# Patient Record
Sex: FEMALE | Race: WHITE | NOT HISPANIC OR LATINO | Employment: FULL TIME | URBAN - METROPOLITAN AREA
[De-identification: names, ages, dates, MRNs, and addresses within clinical notes are randomized per-mention and may not be internally consistent; named-entity substitution may affect disease eponyms.]

---

## 2017-01-05 ENCOUNTER — HOSPITAL ENCOUNTER (EMERGENCY)
Facility: CLINIC | Age: 42
Discharge: HOME | End: 2017-01-05
Attending: PHYSICIAN ASSISTANT | Admitting: PHYSICIAN ASSISTANT
Payer: COMMERCIAL

## 2017-01-05 VITALS
HEART RATE: 92 BPM | OXYGEN SATURATION: 100 % | RESPIRATION RATE: 16 BRPM | DIASTOLIC BLOOD PRESSURE: 80 MMHG | TEMPERATURE: 98.4 F | SYSTOLIC BLOOD PRESSURE: 123 MMHG

## 2017-01-05 DIAGNOSIS — R39.15 URINARY URGENCY: ICD-10-CM

## 2017-01-05 LAB
ALBUMIN UR-MCNC: NEGATIVE MG/DL
APPEARANCE UR: CLEAR
BILIRUB UR QL STRIP: NEGATIVE
COLOR UR AUTO: YELLOW
GLUCOSE UR STRIP-MCNC: NEGATIVE MG/DL
HCG UR QL: NEGATIVE
HGB UR QL STRIP: NEGATIVE
KETONES UR STRIP-MCNC: NEGATIVE MG/DL
LEUKOCYTE ESTERASE UR QL STRIP: ABNORMAL
MICRO REPORT STATUS: NORMAL
MUCOUS THREADS #/AREA URNS LPF: PRESENT /LPF
NITRATE UR QL: NEGATIVE
PH UR STRIP: 5 PH (ref 5–7)
RBC #/AREA URNS AUTO: <1 /HPF (ref 0–2)
SP GR UR STRIP: 1.02 (ref 1–1.03)
SPECIMEN SOURCE: NORMAL
SQUAMOUS #/AREA URNS AUTO: 5 /HPF (ref 0–1)
URN SPEC COLLECT METH UR: ABNORMAL
UROBILINOGEN UR STRIP-MCNC: NORMAL MG/DL (ref 0–2)
WBC #/AREA URNS AUTO: 1 /HPF (ref 0–2)
WET PREP SPEC: NORMAL

## 2017-01-05 PROCEDURE — 87491 CHLMYD TRACH DNA AMP PROBE: CPT | Performed by: PHYSICIAN ASSISTANT

## 2017-01-05 PROCEDURE — 87591 N.GONORRHOEAE DNA AMP PROB: CPT | Performed by: PHYSICIAN ASSISTANT

## 2017-01-05 PROCEDURE — 87210 SMEAR WET MOUNT SALINE/INK: CPT | Performed by: PHYSICIAN ASSISTANT

## 2017-01-05 PROCEDURE — 87086 URINE CULTURE/COLONY COUNT: CPT | Performed by: PHYSICIAN ASSISTANT

## 2017-01-05 PROCEDURE — 99213 OFFICE O/P EST LOW 20 MIN: CPT

## 2017-01-05 PROCEDURE — 81001 URINALYSIS AUTO W/SCOPE: CPT | Performed by: PHYSICIAN ASSISTANT

## 2017-01-05 PROCEDURE — 99213 OFFICE O/P EST LOW 20 MIN: CPT | Performed by: PHYSICIAN ASSISTANT

## 2017-01-05 PROCEDURE — 81025 URINE PREGNANCY TEST: CPT | Performed by: PHYSICIAN ASSISTANT

## 2017-01-05 NOTE — ED AVS SNAPSHOT
[facility] Emergency Department    [address] [address]    Phone:  [phone]    Fax:  [phone]                                       Lauren Macdonald   MRN: [id number]    Department:  [facility] Emergency Department   Date of Visit:  1/5/2017           Patient Information     Date Of Birth          1975        Your diagnoses for this visit were:     Urinary urgency        You were seen by Cary Ramirez PA-C.      Follow-up Information     Follow up with Duke Abad MD In 1 day.    Specialty:  Family Practice    Why:  As needed, If symptoms worsen    Contact information:    [facility]  [address] [facility] [address]  [phone]        Discharge References/Attachments     DYSURIA, UNCERTAIN CAUSE (ADULT) (ENGLISH)      24 Hour Appointment Hotline       To make an appointment at any [facility], call [phone]). If you don't have a family doctor or clinic, we will help you find one. [facility] are conveniently located to serve the needs of you and your family.             Review of your medicines      Our records show that you are taking the medicines listed below. If these are incorrect, please call your family doctor or clinic.        Dose / Directions Last dose taken    PROBIOTIC ADVANCED PO        Take by mouth daily   Refills:  0        TYLENOL PO   Dose:  1000 mg        Take 1,000 mg by mouth every 8 hours as needed for mild pain or fever   Refills:  0                Procedures and tests performed during your visit     Chlamydia trachomatis PCR    HCG qualitative urine    Neisseria gonorrhoea PCR    UA reflex to Microscopic    Wet prep      Orders Needing Specimen Collection     None      Pending Results     Date and Time Order Name Status Description    1/5/2017 1320 Chlamydia trachomatis PCR In process     1/5/2017 1320 Neisseria gonorrhoea PCR In process             Pending Culture Results     Date and  Time Order Name Status Description    1/5/2017 1320 Chlamydia trachomatis PCR In process     1/5/2017 1320 Neisseria gonorrhoea PCR In process        Test Results from your hospital stay           1/5/2017 12:52 PM - Interface, Flexilab Results      Component Results     Component Value Ref Range & Units Status    Color Urine Yellow  Final    Appearance Urine Clear  Final    Glucose Urine Negative NEG mg/dL Final    Bilirubin Urine Negative NEG Final    Ketones Urine Negative NEG mg/dL Final    Specific Gravity Urine 1.017 1.003 - 1.035 Final    Blood Urine Negative NEG Final    pH Urine 5.0 5.0 - 7.0 pH Final    Protein Albumin Urine Negative NEG mg/dL Final    Urobilinogen mg/dL Normal 0.0 - 2.0 mg/dL Final    Nitrite Urine Negative NEG Final    Leukocyte Esterase Urine Small (A) NEG Final    Source Midstream Urine  Final    RBC Urine <1 0 - 2 /HPF Final    WBC Urine 1 0 - 2 /HPF Final    Squamous Epithelial /HPF Urine 5 (H) 0 - 1 /HPF Final    Mucous Urine Present (A) NEG /LPF Final         1/5/2017 12:41 PM - Interface, Flexilab Results      Component Results     Component Value Ref Range & Units Status    HCG Qual Urine Negative NEG Final         1/5/2017  1:35 PM - Interface, Flexilab Results      Component Results     Component    Specimen Description    Vagina    Wet Prep    Moderate WBC'S seen  No yeast seen  No clue cells seen  No Trichomonas seen      Micro Report Status    FINAL 01/05/2017 1/5/2017  1:27 PM - Interface, Flexilab Results         1/5/2017  1:27 PM - Interface, Flexilab Results                Thank you for choosing [facility]       Thank you for choosing [facility] for your care. Our goal is always to provide you with excellent care. Hearing back from our patients is one way we can continue to improve our services. Please take a few minutes to complete the written survey that you may receive in the mail after you visit with us. Thank you!        [email or web address] Information     [employer] EMR gives you  secure access to your electronic health record. If you see a primary care provider, you can also send messages to your care team and make appointments. If you have questions, please call your primary care clinic.  If you do not have a primary care provider, please call [phone] and they will assist you.        Care EveryWhere ID     This is your Care EveryWhere ID. This could be used by other organizations to access your [facility] medical records  [id number]        After Visit Summary       This is your record. Keep this with you and show to your community pharmacist(s) and doctor(s) at your next visit.

## 2017-01-05 NOTE — ED PROVIDER NOTES
"  History     Chief Complaint   Patient presents with     UTI     HPI  Lauren Macdonald is a 41 year old female who  presents today for a possible UTI. Symptoms of urgency, frequency, suprapubic pain and pressure, voiding in small amounts and low back pain have been going on for 2 days.  She denies any hematuria.  She did have nausea and vomiting yesterday which has improved, last episode of emesis was more than 24 hours prior to arrival.  She denies any actual dysuria, hematuria, vaginal pain, pruritis or discharge,  fever, diarrhea, flank pain, or rigors.  She states that she has had numerous ill contacts at work with similar GI symptoms.  She has not had a history of urinary tract infections previously.  Her past medical history is significant for ovarian cyst.  Surgical history is significant for prior tubal ligation.      Past Medical History   Diagnosis Date     Reaction, situational      \"depression\" during divorce     Gastro-oesophageal reflux disease      Current Outpatient Prescriptions   Medication Sig Dispense Refill     Acetaminophen (TYLENOL PO) Take 1,000 mg by mouth every 8 hours as needed for mild pain or fever       Probiotic Product (PROBIOTIC ADVANCED PO) Take by mouth daily       Social History   Substance Use Topics     Smoking status: Never Smoker      Smokeless tobacco: Never Used     Alcohol Use: No      Comment: rare     I have reviewed the Medications, Allergies, Past Medical and Surgical History, and Social History in the Epic system.    Review of Systems  CONSTITUTIONAL:POSITIVE  for chills and NEGATIVE  for fever and myalgias  INTEGUMENTARY/SKIN: NEGATIVE for worrisome rashes, moles or lesions  RESP:NEGATIVE for significant cough or SOB  GI: POSITIVE for suprapubic pain, resolved nausea, vomiting and NEGATIVE for diarrhea   : POSITIVE for increased urinary frequency,  Urgency, voiding in small amounts.  NEGATIVE for dysuria, hematuria or vaginal discharge   Physical Exam   /80 " mmHg  Pulse 92  Temp(Src) 98.4  F (36.9  C) (Oral)  Resp 16  SpO2 100%  Physical Exam  GENERAL APPEARANCE: healthy, alert and no distress  RESP: lungs clear to auscultation - no rales, rhonchi or wheezes  CV: regular rates and rhythm, normal S1 S2, no murmur noted  ABDOMEN:  soft, nontender, no HSM or masses and bowel sounds normal  BACK: No CVA tenderness  GU_female: external genitalia normal, vaginal rugae present, thin white non-malodorous discharge present, cervix normal in appearance, no cervical motion tenderness, adenexal mass noted  SKIN: no suspicious lesions or rashes  ED Course   Procedures        Critical Care time:  none            Labs Ordered and Resulted from Time of ED Arrival Up to the Time of Departure from the ED   URINE MACROSCOPIC WITH REFLEX TO MICRO - Abnormal; Notable for the following:     Leukocyte Esterase Urine Small (*)     Squamous Epithelial /HPF Urine 5 (*)     Mucous Urine Present (*)     All other components within normal limits   HCG QUALITATIVE URINE   WET PREP   NEISSERIA GONORRHOEAE PCR   CHLAMYDIA TRACHOMATIS PCR   URINE CULTURE AEROBIC BACTERIAL     Assessments & Plan (with Medical Decision Making)     I have reviewed the nursing notes.    I have reviewed the findings, diagnosis, plan and need for follow up with the patient.  Discharge Medication List as of 1/5/2017  1:49 PM        Final diagnoses:   Urinary urgency     41 year old female presents to  with concern over two day history of increased urinary frequency, urgency, voiding in small amounts which was preceded by nausea and vomiting.  She had stable vital signs upon arrival.  Physical exam findings as described above included non-surgical abdominal exam.  As part of evaluation she did have urinalysis which did show some contamination with squamous epithelials present, no evidence of acute infection.  She had negative UPT and wet prep with urine culture,  chlamydia and gonorrhea pending at time of discharge.  I  did discuss risk/benefits of empiric treatment with antibiotics based on patient's symptoms and she elected to defer treatment at this time.  I suspect that her vomiting is unrelated to urinary symptoms.  She does not have any other evidence of acute pyelonephritis.  I also do not suspect nephrolithiasis, or PID.  Patient was discharged home stable with instructions to follow up with PCP if no improvement in 3-5 days.  Worrisome reasons to return to ER/UC sooner discussed.      1/5/2017   [facility] EMERGENCY DEPARTMENT      Cary Ramirez PA-C  01/06/17 3633

## 2017-01-05 NOTE — ED AVS SNAPSHOT
[facility] Emergency Department    [address] [address]    Phone:  [phone]    Fax:  [phone]                                       Lauren Macdonald   MRN: [id number]    Department:  [facility] Emergency Department   Date of Visit:  1/5/2017           After Visit Summary Signature Page     I have received my discharge instructions, and my questions have been answered. I have discussed any challenges I see with this plan with the nurse or doctor.    ..........................................................................................................................................  Patient/Patient Representative Signature      ..........................................................................................................................................  Patient Representative Print Name and Relationship to Patient    ..................................................               ................................................  Date                                            Time    ..........................................................................................................................................  Reviewed by Signature/Title    ...................................................              ..............................................  Date                                                            Time

## 2017-01-06 LAB
C TRACH DNA SPEC QL NAA+PROBE: NORMAL
N GONORRHOEA DNA SPEC QL NAA+PROBE: NORMAL
SPECIMEN SOURCE: NORMAL
SPECIMEN SOURCE: NORMAL

## 2017-01-07 LAB
BACTERIA SPEC CULT: NORMAL
MICRO REPORT STATUS: NORMAL
SPECIMEN SOURCE: NORMAL

## 2017-01-25 ENCOUNTER — OFFICE VISIT (OUTPATIENT)
Dept: FAMILY MEDICINE | Facility: CLINIC | Age: 42
End: 2017-01-25
Payer: COMMERCIAL

## 2017-01-25 VITALS
BODY MASS INDEX: 21.82 KG/M2 | SYSTOLIC BLOOD PRESSURE: 117 MMHG | RESPIRATION RATE: 16 BRPM | DIASTOLIC BLOOD PRESSURE: 73 MMHG | TEMPERATURE: 98.3 F | WEIGHT: 127.8 LBS | OXYGEN SATURATION: 98 % | HEART RATE: 86 BPM | HEIGHT: 64 IN

## 2017-01-25 DIAGNOSIS — J01.90 ACUTE SINUSITIS WITH SYMPTOMS > 10 DAYS: Primary | ICD-10-CM

## 2017-01-25 DIAGNOSIS — J20.9 ACUTE BRONCHITIS WITH SYMPTOMS > 10 DAYS: ICD-10-CM

## 2017-01-25 PROCEDURE — 99214 OFFICE O/P EST MOD 30 MIN: CPT | Performed by: NURSE PRACTITIONER

## 2017-01-25 RX ORDER — ALBUTEROL SULFATE 90 UG/1
2 AEROSOL, METERED RESPIRATORY (INHALATION) EVERY 6 HOURS PRN
Qty: 1 INHALER | Refills: 0 | Status: SHIPPED | OUTPATIENT
Start: 2017-01-25 | End: 2017-03-31

## 2017-01-25 RX ORDER — CODEINE PHOSPHATE AND GUAIFENESIN 10; 100 MG/5ML; MG/5ML
1 SOLUTION ORAL
Qty: 120 ML | Refills: 0 | Status: SHIPPED | OUTPATIENT
Start: 2017-01-25 | End: 2017-03-20

## 2017-01-25 RX ORDER — PREDNISONE 20 MG/1
TABLET ORAL
Qty: 10 TABLET | Refills: 0 | Status: SHIPPED | OUTPATIENT
Start: 2017-01-25 | End: 2017-03-20

## 2017-01-25 NOTE — MR AVS SNAPSHOT
After Visit Summary   1/25/2017    Lauren Macdonald    MRN: [id number]           Patient Information     Date Of Birth          1975        Visit Information        Provider Department      1/25/2017 2:40 PM [facility]ERVE SAME DAY PROVIDER [facility]        Today's Diagnoses     Acute sinusitis with symptoms > 10 days    -  1     Acute bronchitis with symptoms > 10 days           Care Instructions    Medications as directed   Symptom Relief: Afdrin do not use greater then 3 days  Intranasal corticosteroid   Flonase rescribed.     Tylenol or Ibuprofen if able to take for fevers and discomfort.  Do not exceed 4 grams of Tylenol in a 24 hour period.  Take Ibuprofen with food.  Hydrate with fluids, rest, cool humidifier.  May use acetaminophen, ibuprofen prn.    For your Cough   The Buckwheat Honey Dose: Given   hour Prior to Bedtime  For children age under 1 year -Do not use due to botulism risk     2-5 years -  tsp (2.5 mL)    6-11 years -1 tsp (5 mL)    12-18 years -2 tsp (10 mL)     Guaifenesin     Adult dose -Not to exceed 2.4 g (2400mg) per day    Child age 6-12 years -100 mg every 4 hr, not to exceed 1.2 g (1200mg) per day     Pediatric, 2-6 years -50 mg every 4 hr as needed, not to exceed 600 mg per day    Cough medications is not recommended in children under 2 years.  With use of cough medications have combination medications be aware of products in the cough medication you are using to avoid overdose    Follow up with PCP in 2 weeks  .    Go to Emergency Room if sx worsen or change, Shortness of breath, chest pain, persistent fevers, or painful breathing occur.     Patient voiced understanding of instructions given.      Follow-up with your primary care provider next week and as needed.    Indications for emergent return to emergency department discussed with patient, who verbalized good understanding and agreement.  Patient understands the limitations of today's evaluation.          Sinusitis (Antibiotic Treatment)    The sinuses are air-filled spaces within the bones of the face. They connect to the inside of the nose. Sinusitis is an inflammation of the tissue lining the sinus cavity. Sinus inflammation can occur during a cold. It can also be due to allergies to pollens and other particles in the air. Sinusitis can cause symptoms of sinus congestion and fullness. A sinus infection causes fever, headache and facial pain. There is often green or yellow drainage from the nose or into the back of the throat (post-nasal drip). You have been given antibiotics to treat this condition.  Home care:    Take the full course of antibiotics as instructed. Do not stop taking them, even if you feel better.    Drink plenty of water, hot tea, and other liquids. This may help thin mucus. It also may promote sinus drainage.    Heat may help soothe painful areas of the face. Use a towel soaked in hot water. Or,  the shower and direct the hot spray onto your face. Using a vaporizer along with a menthol rub at night may also help.     An expectorant containing guaifenesin may help thin the mucus and promote drainage from the sinuses.    Over-the-counter decongestants may be used unless a similar medicine was prescribed. Nasal sprays work the fastest. Use one that contains phenylephrine or oxymetazoline. First blow the nose gently. Then use the spray. Do not use these medicines more often than directed on the label or symptoms may get worse. You may also use tablets containing pseudoephedrine. Avoid products that combine ingredients, because side effects may be increased. Read labels. You can also ask the pharmacist for help. (NOTE: Persons with high blood pressure should not use decongestants. They can raise blood pressure.)    Over-the-counter antihistamines may help if allergies contributed to your sinusitis.      Do not use nasal rinses or irrigation during an acute sinus infection, unless told  to by your health care provider. Rinsing may spread the infection to other sinuses.    Use acetaminophen or ibuprofen to control pain, unless another pain medicine was prescribed. (If you have chronic liver or kidney disease or ever had a stomach ulcer, talk with your doctor before using these medicines. Aspirin should never be used in anyone under 18 years of age who is ill with a fever. It may cause severe liver damage.)    Don't smoke. This can worsen symptoms.  Follow-up care  Follow up with your healthcare provider or our staff if you are not improving within the next week.  When to seek medical advice  Call your healthcare provider if any of these occur:    Facial pain or headache becoming more severe    Stiff neck    Unusual drowsiness or confusion    Swelling of the forehead or eyelids    Vision problems, including blurred or double vision    Fever of 100.4 F (38 C) or higher, or as directed by your healthcare provider    Seizure    Breathing problems    Symptoms not resolving within 10 days    1165-0094 [employer]. [address] [place] [address]. All rights reserved. This information is not intended as a substitute for professional medical care. Always follow your healthcare professional's instructions.        Bronchitis, Antibiotic Treatment (Adult)    Bronchitis is an infection of the air passages (bronchial tubes) in your lungs. It often occurs when you have a cold. This illness is contagious during the first few days and is spread through the air by coughing and sneezing, or by direct contact (touching the sick person and then touching your own eyes, nose, or mouth).  Symptoms of bronchitis include cough with mucus (phlegm) and low-grade fever. Bronchitis usually lasts 7 to 14 days. Mild cases can be treated with simple home remedies. More severe infection is treated with an antibiotic.  Home care  Follow these guidelines when caring for yourself at home:    If your symptoms  are severe, rest at home for the first 2 to 3 days. When you go back to your usual activities, don't let yourself get too tired.    Do not smoke. Also avoid being exposed to secondhand smoke.    You may use over-the-counter medicines to control fever or pain, unless another medicine was prescribed. (Note: If you have chronic liver or kidney disease or have ever had a stomach ulcer or gastrointestinal bleeding, talk with your healthcare provider before using these medicines. Also talk to your provider if you are taking medicine to prevent blood clots.) Aspirin should never be given to anyone younger than 18 years of age who is ill with a viral infection or fever. It may cause severe liver or brain damage.    Your appetite may be poor, so a light diet is fine. Avoid dehydration by drinking 6 to 8 glasses of fluids per day (such as water, soft drinks, sports drinks, juices, tea, or soup). Extra fluids will help loosen secretions in the nose and lungs.    Over-the-counter cough, cold, and sore-throat medicines will not shorten the length of the illness, but they may be helpful to reduce symptoms. (Note: Do not use decongestants if you have high blood pressure.)    Finish all antibiotic medicine. Do this even if you are feeling better after only a few days.  Follow-up care  Follow up with your healthcare provider, or as advised. If you had an X-ray or ECG (electrocardiogram), a specialist will review it. You will be notified of any new findings that may affect your care.  Note: If you are age 65 or older, or if you have a chronic lung disease or condition that affects your immune system, or you smoke, talk to your healthcare provider about having pneumococcal vaccinations and a yearly influenza vaccination (flu shot).  When to seek medical advice  Call your healthcare provider right away if any of these occur:    Fever of 100.4 F (38 C) or higher    Coughing up increased amounts of colored sputum    Weakness, drowsiness,  headache, facial pain, ear pain, or a stiff neck   Call 911, or get immediate medical care  Contact emergency services right away if any of these occur.    Coughing up blood    Worsening weakness, drowsiness, headache, or stiff neck    Trouble breathing, wheezing, or pain with breathing    8458-3624 [employer]. [address] [place] [address]. All rights reserved. This information is not intended as a substitute for professional medical care. Always follow your healthcare professional's instructions.              Follow-ups after your visit        Who to contact     If you have questions or need follow up information about today's clinic visit or your schedule please contact [facility] directly at [phone].  Normal or non-critical lab and imaging results will be communicated to you by "[employer]hart, letter or phone within 4 business days after the clinic has received the results. If you do not hear from us within 7 days, please contact the clinic through "[employer]hart or phone. If you have a critical or abnormal lab result, we will notify you by phone as soon as possible.  Submit refill requests through [employer] or call your pharmacy and they will forward the refill request to us. Please allow 3 business days for your refill to be completed.          Additional Information About Your Visit        [employer] Information     [employer] gives you secure access to your electronic health record. If you see a primary care provider, you can also send messages to your care team and make appointments. If you have questions, please call your primary care clinic.  If you do not have a primary care provider, please call [phone] and they will assist you.        Care EveryWhere ID     This is your Care EveryWhere ID. This could be used by other organizations to access your [facility] medical records  [id number]        Your Vitals Were     Pulse Temperature Respirations    86 98.3  F (36.8  C)  "(Tympanic) 16    Height BMI (Body Mass Index) Pulse Oximetry    5' 4\" (1.626 m) 21.93 kg/m2 98%    Last Period          01/16/2017 (Approximate)         Blood Pressure from Last 3 Encounters:   01/25/17 117/73   01/05/17 123/80   12/12/16 126/68    Weight from Last 3 Encounters:   01/25/17 127 lb 12.8 oz (57.97 kg)   12/12/16 133 lb (60.328 kg)   02/05/16 130 lb 11.2 oz (59.285 kg)              Today, you had the following     No orders found for display         Today's Medication Changes          These changes are accurate as of: 1/25/17  2:59 PM.  If you have any questions, ask your nurse or doctor.               Start taking these medicines.        Dose/Directions    albuterol 108 (90 BASE) MCG/ACT Inhaler   Commonly known as:  PROAIR HFA/PROVENTIL HFA/VENTOLIN HFA   Used for:  Acute bronchitis with symptoms > 10 days        Dose:  2 puff   Inhale 2 puffs into the lungs every 6 hours as needed for shortness of breath / dyspnea or wheezing   Quantity:  1 Inhaler   Refills:  0       amoxicillin-clavulanate 875-125 MG per tablet   Commonly known as:  AUGMENTIN   Used for:  Acute sinusitis with symptoms > 10 days, Acute bronchitis with symptoms > 10 days        Dose:  1 tablet   Take 1 tablet by mouth 2 times daily   Quantity:  20 tablet   Refills:  0       predniSONE 20 MG tablet   Commonly known as:  DELTASONE   Used for:  Acute bronchitis with symptoms > 10 days        Take one tablet twice a day for 5 days.   Quantity:  10 tablet   Refills:  0            Where to get your medicines      These medications were sent to [id number] [address]  [address] [facility] [address] [address]    Hours:  Closed 10-16-08 business to [facility] Phone:  [phone]    - albuterol 108 (90 BASE) MCG/ACT Inhaler  - amoxicillin-clavulanate 875-125 MG per tablet  - predniSONE 20 MG tablet             Primary Care Provider Office Phone # Fax #    Duke Abad [phone] "       [facility] [address] [facility] [address]        Thank you!     Thank you for choosing [facility]  for your care. Our goal is always to provide you with excellent care. Hearing back from our patients is one way we can continue to improve our services. Please take a few minutes to complete the written survey that you may receive in the mail after your visit with us. Thank you!             Your Updated Medication List - Protect others around you: Learn how to safely use, store and throw away your medicines at www.disposemymeds.org.          This list is accurate as of: 1/25/17  2:59 PM.  Always use your most recent med list.                   Brand Name Dispense Instructions for use    albuterol 108 (90 BASE) MCG/ACT Inhaler    PROAIR HFA/PROVENTIL HFA/VENTOLIN HFA    1 Inhaler    Inhale 2 puffs into the lungs every 6 hours as needed for shortness of breath / dyspnea or wheezing       amoxicillin-clavulanate 875-125 MG per tablet    AUGMENTIN    20 tablet    Take 1 tablet by mouth 2 times daily       predniSONE 20 MG tablet    DELTASONE    10 tablet    Take one tablet twice a day for 5 days.       PROBIOTIC ADVANCED PO      Take by mouth daily       TYLENOL PO      Take 1,000 mg by mouth every 8 hours as needed for mild pain or fever

## 2017-01-25 NOTE — NURSING NOTE
"Chief Complaint   Patient presents with     Cough     cough X 2 weeks       Initial /73 mmHg  Pulse 86  Temp(Src) 98.3  F (36.8  C) (Tympanic)  Resp 16  Ht 5' 4\" (1.626 m)  Wt 127 lb 12.8 oz (57.97 kg)  BMI 21.93 kg/m2  SpO2 98%  LMP 01/16/2017 (Approximate) Estimated body mass index is 21.93 kg/(m^2) as calculated from the following:    Height as of this encounter: 5' 4\" (1.626 m).    Weight as of this encounter: 127 lb 12.8 oz (57.97 kg).  BP completed using cuff size: regular    "

## 2017-01-25 NOTE — Clinical Note
[facility]  [address] [place] [id number]  Phone: [phone]  Fax: [phone]    January 25, 2017        Lauren Macdonald  [address] [facility] [address]          To whom it may concern:    RE: Lauren RANGEL Renae    Patient was seen and treated today at our clinic and missed work.    Please contact me for questions or concerns.      Sincerely,        HERVE SAME DAY PROVIDER

## 2017-01-25 NOTE — PATIENT INSTRUCTIONS
Medications as directed   Symptom Relief: Afdrin do not use greater then 3 days  Intranasal corticosteroid   Flonase rescribed.     Tylenol or Ibuprofen if able to take for fevers and discomfort.  Do not exceed 4 grams of Tylenol in a 24 hour period.  Take Ibuprofen with food.  Hydrate with fluids, rest, cool humidifier.  May use acetaminophen, ibuprofen prn.    For your Cough   The Buckwheat Honey Dose: Given   hour Prior to Bedtime  For children age under 1 year -Do not use due to botulism risk     2-5 years -  tsp (2.5 mL)    6-11 years -1 tsp (5 mL)    12-18 years -2 tsp (10 mL)     Guaifenesin     Adult dose -Not to exceed 2.4 g (2400mg) per day    Child age 6-12 years -100 mg every 4 hr, not to exceed 1.2 g (1200mg) per day     Pediatric, 2-6 years -50 mg every 4 hr as needed, not to exceed 600 mg per day    Cough medications is not recommended in children under 2 years.  With use of cough medications have combination medications be aware of products in the cough medication you are using to avoid overdose    Follow up with PCP in 2 weeks  .    Go to Emergency Room if sx worsen or change, Shortness of breath, chest pain, persistent fevers, or painful breathing occur.     Patient voiced understanding of instructions given.      Follow-up with your primary care provider next week and as needed.    Indications for emergent return to emergency department discussed with patient, who verbalized good understanding and agreement.  Patient understands the limitations of today's evaluation.         Sinusitis (Antibiotic Treatment)    The sinuses are air-filled spaces within the bones of the face. They connect to the inside of the nose. Sinusitis is an inflammation of the tissue lining the sinus cavity. Sinus inflammation can occur during a cold. It can also be due to allergies to pollens and other particles in the air. Sinusitis can cause symptoms of sinus congestion and fullness. A sinus infection causes fever, headache  and facial pain. There is often green or yellow drainage from the nose or into the back of the throat (post-nasal drip). You have been given antibiotics to treat this condition.  Home care:    Take the full course of antibiotics as instructed. Do not stop taking them, even if you feel better.    Drink plenty of water, hot tea, and other liquids. This may help thin mucus. It also may promote sinus drainage.    Heat may help soothe painful areas of the face. Use a towel soaked in hot water. Or,  the shower and direct the hot spray onto your face. Using a vaporizer along with a menthol rub at night may also help.     An expectorant containing guaifenesin may help thin the mucus and promote drainage from the sinuses.    Over-the-counter decongestants may be used unless a similar medicine was prescribed. Nasal sprays work the fastest. Use one that contains phenylephrine or oxymetazoline. First blow the nose gently. Then use the spray. Do not use these medicines more often than directed on the label or symptoms may get worse. You may also use tablets containing pseudoephedrine. Avoid products that combine ingredients, because side effects may be increased. Read labels. You can also ask the pharmacist for help. (NOTE: Persons with high blood pressure should not use decongestants. They can raise blood pressure.)    Over-the-counter antihistamines may help if allergies contributed to your sinusitis.      Do not use nasal rinses or irrigation during an acute sinus infection, unless told to by your health care provider. Rinsing may spread the infection to other sinuses.    Use acetaminophen or ibuprofen to control pain, unless another pain medicine was prescribed. (If you have chronic liver or kidney disease or ever had a stomach ulcer, talk with your doctor before using these medicines. Aspirin should never be used in anyone under 18 years of age who is ill with a fever. It may cause severe liver damage.)    Don't  smoke. This can worsen symptoms.  Follow-up care  Follow up with your healthcare provider or our staff if you are not improving within the next week.  When to seek medical advice  Call your healthcare provider if any of these occur:    Facial pain or headache becoming more severe    Stiff neck    Unusual drowsiness or confusion    Swelling of the forehead or eyelids    Vision problems, including blurred or double vision    Fever of 100.4 F (38 C) or higher, or as directed by your healthcare provider    Seizure    Breathing problems    Symptoms not resolving within 10 days    [id number] [employer]. [address] [place], [place] [id number]. All rights reserved. This information is not intended as a substitute for professional medical care. Always follow your healthcare professional's instructions.        Bronchitis, Antibiotic Treatment (Adult)    Bronchitis is an infection of the air passages (bronchial tubes) in your lungs. It often occurs when you have a cold. This illness is contagious during the first few days and is spread through the air by coughing and sneezing, or by direct contact (touching the sick person and then touching your own eyes, nose, or mouth).  Symptoms of bronchitis include cough with mucus (phlegm) and low-grade fever. Bronchitis usually lasts 7 to 14 days. Mild cases can be treated with simple home remedies. More severe infection is treated with an antibiotic.  Home care  Follow these guidelines when caring for yourself at home:    If your symptoms are severe, rest at home for the first 2 to 3 days. When you go back to your usual activities, don't let yourself get too tired.    Do not smoke. Also avoid being exposed to secondhand smoke.    You may use over-the-counter medicines to control fever or pain, unless another medicine was prescribed. (Note: If you have chronic liver or kidney disease or have ever had a stomach ulcer or gastrointestinal bleeding, talk with your  healthcare provider before using these medicines. Also talk to your provider if you are taking medicine to prevent blood clots.) Aspirin should never be given to anyone younger than 18 years of age who is ill with a viral infection or fever. It may cause severe liver or brain damage.    Your appetite may be poor, so a light diet is fine. Avoid dehydration by drinking 6 to 8 glasses of fluids per day (such as water, soft drinks, sports drinks, juices, tea, or soup). Extra fluids will help loosen secretions in the nose and lungs.    Over-the-counter cough, cold, and sore-throat medicines will not shorten the length of the illness, but they may be helpful to reduce symptoms. (Note: Do not use decongestants if you have high blood pressure.)    Finish all antibiotic medicine. Do this even if you are feeling better after only a few days.  Follow-up care  Follow up with your healthcare provider, or as advised. If you had an X-ray or ECG (electrocardiogram), a specialist will review it. You will be notified of any new findings that may affect your care.  Note: If you are age 65 or older, or if you have a chronic lung disease or condition that affects your immune system, or you smoke, talk to your healthcare provider about having pneumococcal vaccinations and a yearly influenza vaccination (flu shot).  When to seek medical advice  Call your healthcare provider right away if any of these occur:    Fever of 100.4 F (38 C) or higher    Coughing up increased amounts of colored sputum    Weakness, drowsiness, headache, facial pain, ear pain, or a stiff neck   Call 911, or get immediate medical care  Contact emergency services right away if any of these occur.    Coughing up blood    Worsening weakness, drowsiness, headache, or stiff neck    Trouble breathing, wheezing, or pain with breathing    [id number] [employer]. [address] [place] [address], [place] [address]. All rights reserved. This information is not intended as  a substitute for professional medical care. Always follow your healthcare professional's instructions.

## 2017-01-25 NOTE — PROGRESS NOTES
"  SUBJECTIVE:                                                    Lauren Macdonald is a 41 year old female who presents to clinic today for the following health issues:    ENT Symptoms             Symptoms: cc Present Absent Comment   Fever/Chills   x    Fatigue  x     Muscle Aches  x     Eye Irritation   x    Sneezing   x    Nasal Terrance/Drg  x     Sinus Pressure/Pain  x     Loss of smell  x     Dental pain   x    Sore Throat  x     Swollen Glands   x    Ear Pain/Fullness   x    Cough  x  X 2 weeks   Wheeze   x    Chest Pain  x  Heavy feeling and tightness in chest   Shortness of breath  x     Rash   x    Other  x  headache     Symptom duration:  2 weeks   Symptom severity:  moderate   Treatments tried:  OTC cold and sinus medicines   Contacts:  work       Past Medical History   Diagnosis Date     Reaction, situational      \"depression\" during divorce     Gastro-oesophageal reflux disease        Social History   Substance Use Topics     Smoking status: Never Smoker      Smokeless tobacco: Never Used     Alcohol Use: No      Comment: rare         ROS  CONSTITUTIONAL:NEGATIVE for fever, chills, change in weight  INTEGUMENTARY/SKIN: NEGATIVE for worrisome rashes, moles or lesions  EYES: NEGATIVE for vision changes or irritation  ENT/MOUTH: NEGATIVE for ear, mouth and throat problems  RESP:NEGATIVE for significant cough or SOB  CV: See above   MUSCULOSKELETAL: NEGATIVE for significant arthralgias or myalgia  NEURO: NEGATIVE for weakness, dizziness or paresthesias    OBJECTIVE:  /73 mmHg  Pulse 86  Temp(Src) 98.3  F (36.8  C) (Tympanic)  Resp 16  Ht 5' 4\" (1.626 m)  Wt 127 lb 12.8 oz (57.97 kg)  BMI 21.93 kg/m2  SpO2 98%  LMP 01/16/2017 (Approximate)  GENERAL APPEARANCE: healthy, alert and no distress  EYES: EOMI,  PERRL, conjunctiva clear  HENT: ear canals and TM's normal.  Mouth without ulcers, erythema or lesions  HENT: nasal turbinates erythematous, swollen  NECK: supple, nontender, no " lymphadenopathy  RESP: lungs clear to auscultation - no rales, rhonchi or wheezes  CV: regular rates and rhythm, normal S1 S2, no murmur noted  ABDOMEN:  soft, nontender, no HSM or masses and bowel sounds normal  NEURO: Normal strength and tone, sensory exam grossly normal,  normal speech and mentation  SKIN: no suspicious lesions or rashes      ASSESSMENT:      ICD-10-CM    1. Acute sinusitis with symptoms > 10 days J01.90 amoxicillin-clavulanate (AUGMENTIN) 875-125 MG per tablet   2. Acute bronchitis with symptoms > 10 days J20.9 predniSONE (DELTASONE) 20 MG tablet     albuterol (PROAIR HFA/PROVENTIL HFA/VENTOLIN HFA) 108 (90 BASE) MCG/ACT Inhaler     amoxicillin-clavulanate (AUGMENTIN) 875-125 MG per tablet     guaiFENesin-codeine (ROBITUSSIN AC) 100-10 MG/5ML SOLN solution         PLAN:  Patient Instructions   Medications as directed   Symptom Relief: Afdrin do not use greater then 3 days  Intranasal corticosteroid   Flonase rescribed.     Tylenol or Ibuprofen if able to take for fevers and discomfort.  Do not exceed 4 grams of Tylenol in a 24 hour period.  Take Ibuprofen with food.  Hydrate with fluids, rest, cool humidifier.  May use acetaminophen, ibuprofen prn.    For your Cough   The Buckwheat Honey Dose: Given   hour Prior to Bedtime  For children age under 1 year -Do not use due to botulism risk     2-5 years -  tsp (2.5 mL)    6-11 years -1 tsp (5 mL)    12-18 years -2 tsp (10 mL)     Guaifenesin     Adult dose -Not to exceed 2.4 g (2400mg) per day    Child age 6-12 years -100 mg every 4 hr, not to exceed 1.2 g (1200mg) per day     Pediatric, 2-6 years -50 mg every 4 hr as needed, not to exceed 600 mg per day    Cough medications is not recommended in children under 2 years.  With use of cough medications have combination medications be aware of products in the cough medication you are using to avoid overdose    Follow up with PCP in 2 weeks  .    Go to Emergency Room if sx worsen or change, Shortness of  breath, chest pain, persistent fevers, or painful breathing occur.     Patient voiced understanding of instructions given.      Follow-up with your primary care provider next week and as needed.    Indications for emergent return to emergency department discussed with patient, who verbalized good understanding and agreement.  Patient understands the limitations of today's evaluation.         Sinusitis (Antibiotic Treatment)    The sinuses are air-filled spaces within the bones of the face. They connect to the inside of the nose. Sinusitis is an inflammation of the tissue lining the sinus cavity. Sinus inflammation can occur during a cold. It can also be due to allergies to pollens and other particles in the air. Sinusitis can cause symptoms of sinus congestion and fullness. A sinus infection causes fever, headache and facial pain. There is often green or yellow drainage from the nose or into the back of the throat (post-nasal drip). You have been given antibiotics to treat this condition.  Home care:    Take the full course of antibiotics as instructed. Do not stop taking them, even if you feel better.    Drink plenty of water, hot tea, and other liquids. This may help thin mucus. It also may promote sinus drainage.    Heat may help soothe painful areas of the face. Use a towel soaked in hot water. Or,  the shower and direct the hot spray onto your face. Using a vaporizer along with a menthol rub at night may also help.     An expectorant containing guaifenesin may help thin the mucus and promote drainage from the sinuses.    Over-the-counter decongestants may be used unless a similar medicine was prescribed. Nasal sprays work the fastest. Use one that contains phenylephrine or oxymetazoline. First blow the nose gently. Then use the spray. Do not use these medicines more often than directed on the label or symptoms may get worse. You may also use tablets containing pseudoephedrine. Avoid products that  combine ingredients, because side effects may be increased. Read labels. You can also ask the pharmacist for help. (NOTE: Persons with high blood pressure should not use decongestants. They can raise blood pressure.)    Over-the-counter antihistamines may help if allergies contributed to your sinusitis.      Do not use nasal rinses or irrigation during an acute sinus infection, unless told to by your health care provider. Rinsing may spread the infection to other sinuses.    Use acetaminophen or ibuprofen to control pain, unless another pain medicine was prescribed. (If you have chronic liver or kidney disease or ever had a stomach ulcer, talk with your doctor before using these medicines. Aspirin should never be used in anyone under 18 years of age who is ill with a fever. It may cause severe liver damage.)    Don't smoke. This can worsen symptoms.  Follow-up care  Follow up with your healthcare provider or our staff if you are not improving within the next week.  When to seek medical advice  Call your healthcare provider if any of these occur:    Facial pain or headache becoming more severe    Stiff neck    Unusual drowsiness or confusion    Swelling of the forehead or eyelids    Vision problems, including blurred or double vision    Fever of 100.4 F (38 C) or higher, or as directed by your healthcare provider    Seizure    Breathing problems    Symptoms not resolving within 10 days    [id number] [employer]. [address] [place] [address], [place] [address]. All rights reserved. This information is not intended as a substitute for professional medical care. Always follow your healthcare professional's instructions.        Bronchitis, Antibiotic Treatment (Adult)    Bronchitis is an infection of the air passages (bronchial tubes) in your lungs. It often occurs when you have a cold. This illness is contagious during the first few days and is spread through the air by coughing and sneezing, or by direct contact  (touching the sick person and then touching your own eyes, nose, or mouth).  Symptoms of bronchitis include cough with mucus (phlegm) and low-grade fever. Bronchitis usually lasts 7 to 14 days. Mild cases can be treated with simple home remedies. More severe infection is treated with an antibiotic.  Home care  Follow these guidelines when caring for yourself at home:    If your symptoms are severe, rest at home for the first 2 to 3 days. When you go back to your usual activities, don't let yourself get too tired.    Do not smoke. Also avoid being exposed to secondhand smoke.    You may use over-the-counter medicines to control fever or pain, unless another medicine was prescribed. (Note: If you have chronic liver or kidney disease or have ever had a stomach ulcer or gastrointestinal bleeding, talk with your healthcare provider before using these medicines. Also talk to your provider if you are taking medicine to prevent blood clots.) Aspirin should never be given to anyone younger than 18 years of age who is ill with a viral infection or fever. It may cause severe liver or brain damage.    Your appetite may be poor, so a light diet is fine. Avoid dehydration by drinking 6 to 8 glasses of fluids per day (such as water, soft drinks, sports drinks, juices, tea, or soup). Extra fluids will help loosen secretions in the nose and lungs.    Over-the-counter cough, cold, and sore-throat medicines will not shorten the length of the illness, but they may be helpful to reduce symptoms. (Note: Do not use decongestants if you have high blood pressure.)    Finish all antibiotic medicine. Do this even if you are feeling better after only a few days.  Follow-up care  Follow up with your healthcare provider, or as advised. If you had an X-ray or ECG (electrocardiogram), a specialist will review it. You will be notified of any new findings that may affect your care.  Note: If you are age 65 or older, or if you have a chronic lung  disease or condition that affects your immune system, or you smoke, talk to your healthcare provider about having pneumococcal vaccinations and a yearly influenza vaccination (flu shot).  When to seek medical advice  Call your healthcare provider right away if any of these occur:    Fever of 100.4 F (38 C) or higher    Coughing up increased amounts of colored sputum    Weakness, drowsiness, headache, facial pain, ear pain, or a stiff neck   Call 911, or get immediate medical care  Contact emergency services right away if any of these occur.    Coughing up blood    Worsening weakness, drowsiness, headache, or stiff neck    Trouble breathing, wheezing, or pain with breathing    [id number] [employer]. [address] [place] [id number]. All rights reserved. This information is not intended as a substitute for professional medical care. Always follow your healthcare professional's instructions.              CANDICE Aquino CNP

## 2017-03-20 ENCOUNTER — HOSPITAL ENCOUNTER (EMERGENCY)
Facility: CLINIC | Age: 42
Discharge: HOME | End: 2017-03-20
Attending: NURSE PRACTITIONER | Admitting: NURSE PRACTITIONER
Payer: COMMERCIAL

## 2017-03-20 VITALS
TEMPERATURE: 98.1 F | DIASTOLIC BLOOD PRESSURE: 75 MMHG | SYSTOLIC BLOOD PRESSURE: 127 MMHG | OXYGEN SATURATION: 100 % | RESPIRATION RATE: 16 BRPM

## 2017-03-20 DIAGNOSIS — J98.01 ACUTE BRONCHOSPASM: ICD-10-CM

## 2017-03-20 PROCEDURE — 99213 OFFICE O/P EST LOW 20 MIN: CPT

## 2017-03-20 PROCEDURE — 99213 OFFICE O/P EST LOW 20 MIN: CPT | Performed by: NURSE PRACTITIONER

## 2017-03-20 RX ORDER — ALBUTEROL SULFATE 90 UG/1
2 AEROSOL, METERED RESPIRATORY (INHALATION) EVERY 6 HOURS PRN
Qty: 1 INHALER | Refills: 0 | Status: SHIPPED | OUTPATIENT
Start: 2017-03-20 | End: 2017-03-31

## 2017-03-20 ASSESSMENT — ENCOUNTER SYMPTOMS
MUSCULOSKELETAL NEGATIVE: 1
EYES NEGATIVE: 1
CARDIOVASCULAR NEGATIVE: 1
NEUROLOGICAL NEGATIVE: 1
STRIDOR: 0
SORE THROAT: 1
CONSTITUTIONAL NEGATIVE: 1
GASTROINTESTINAL NEGATIVE: 1
WHEEZING: 1
CHEST TIGHTNESS: 0
SINUS PRESSURE: 1
SHORTNESS OF BREATH: 0
CHOKING: 0
COUGH: 1

## 2017-03-20 NOTE — DISCHARGE INSTRUCTIONS
Bronchospasm (Adult)    Bronchospasm occurs when the airways (bronchial tubes) go into spasm and contract. This makes it hard to breathe and causes wheezing (a high-pitched whistling sound). Bronchospasm can also cause frequent coughing without wheezing.  Bronchospasm is due to irritation, inflammation, or allergic reaction of the airways. People with asthma get bronchospasm. However, not everyone with bronchospasm has asthma.  Being exposed to harmful fumes, a recent case of bronchitis, exercise, or a flare-up of chronic obstructive pulmonary disease (COPD) may cause the airways to spasm. An episode of bronchospasm may last 7 to 14 days. Medicine may be prescribed to relax the airways and prevent wheezing. Antibiotics will be prescribed only if your healthcare provider thinks there is a bacterial infection. Antibiotics do not help a viral infection.  Home care     Drink lots of water or other fluids (at least 10 glasses a day) during an attack. This will loosen lung secretions and make it easier to breathe. If you have heart or kidney disease, check with your doctor before you drink extra fluids.    Take prescribed medicine exactly at the times advised. If you take an inhaled medicine to help with breathing, do not use it more than once every 4 hours, unless told to do so. If prescribed an antibiotic or prednisone, take all of the medicine, even if you are feeling better after a few days.    Do not smoke. Also avoid being exposed to secondhand smoke.    If you were given an inhaler, use it exactly as directed. If you need to use it more often than prescribed, your condition may be getting worse. Contact your healthcare provider.  Follow-up care  Follow up with your healthcare provider, or as advised.   (Note: If you are age 65 or older, have a chronic lung disease or condition that affects your immune system, or you smoke, we recommend getting pneumococcal vaccinations, as well as an influenza vaccination (flu  shot) every autumn. Ask your healthcare provider about this.)  When to seek medical advice  Call your healthcare provider right away if any of these occur:    You need to use your inhalers more often than usual.    You develop a fever of 100.4 F (38 C) or higher.    You are coughing up lots of dark-colored sputum (mucus).    You do not start to improve within 24 hours.  Call 911, or get immediate medical care  Contact emergency services if any of these occur:    Coughing up bloody sputum (mucus)    Chest pain with each breath    Increased wheezing or shortness of breath    [id number] [employer]. [address] [place] [address], [place] [address]. All rights reserved. This information is not intended as a substitute for professional medical care. Always follow your healthcare professional's instructions.

## 2017-03-20 NOTE — ED AVS SNAPSHOT
[facility] Emergency Department    [address]    [facility] [address]    Phone:  [phone]    Fax:  [phone]                                       Lauren Macdonald   MRN: [id number]    Department:  [facility] Emergency Department   Date of Visit:  3/20/2017           Patient Information     Date Of Birth          1975        Your diagnoses for this visit were:     Acute bronchospasm        You were seen by Ian Mckeon APRN CNP.      Follow-up Information     Follow up with Duke Abad MD.    Specialty:  Family Practice    Why:  As needed, If symptoms worsen    Contact information:    [facility]  [address] [facility] [address]  [phone]          Discharge Instructions         Bronchospasm (Adult)    Bronchospasm occurs when the airways (bronchial tubes) go into spasm and contract. This makes it hard to breathe and causes wheezing (a high-pitched whistling sound). Bronchospasm can also cause frequent coughing without wheezing.  Bronchospasm is due to irritation, inflammation, or allergic reaction of the airways. People with asthma get bronchospasm. However, not everyone with bronchospasm has asthma.  Being exposed to harmful fumes, a recent case of bronchitis, exercise, or a flare-up of chronic obstructive pulmonary disease (COPD) may cause the airways to spasm. An episode of bronchospasm may last 7 to 14 days. Medicine may be prescribed to relax the airways and prevent wheezing. Antibiotics will be prescribed only if your healthcare provider thinks there is a bacterial infection. Antibiotics do not help a viral infection.  Home care     Drink lots of water or other fluids (at least 10 glasses a day) during an attack. This will loosen lung secretions and make it easier to breathe. If you have heart or kidney disease, check with your doctor before you drink extra fluids.    Take prescribed medicine exactly at the times advised. If you take an  inhaled medicine to help with breathing, do not use it more than once every 4 hours, unless told to do so. If prescribed an antibiotic or prednisone, take all of the medicine, even if you are feeling better after a few days.    Do not smoke. Also avoid being exposed to secondhand smoke.    If you were given an inhaler, use it exactly as directed. If you need to use it more often than prescribed, your condition may be getting worse. Contact your healthcare provider.  Follow-up care  Follow up with your healthcare provider, or as advised.   (Note: If you are age 65 or older, have a chronic lung disease or condition that affects your immune system, or you smoke, we recommend getting pneumococcal vaccinations, as well as an influenza vaccination (flu shot) every autumn. Ask your healthcare provider about this.)  When to seek medical advice  Call your healthcare provider right away if any of these occur:    You need to use your inhalers more often than usual.    You develop a fever of 100.4 F (38 C) or higher.    You are coughing up lots of dark-colored sputum (mucus).    You do not start to improve within 24 hours.  Call 911, or get immediate medical care  Contact emergency services if any of these occur:    Coughing up bloody sputum (mucus)    Chest pain with each breath    Increased wheezing or shortness of breath    3450-2204 [employer]. [address] [place] [address]. All rights reserved. This information is not intended as a substitute for professional medical care. Always follow your healthcare professional's instructions.          24 Hour Appointment Hotline       To make an appointment at any [facility], call [phone] ([phone]). If you don't have a family doctor or clinic, we will help you find one. [place] clinics are conveniently located to serve the needs of you and your family.             Review of your medicines      Our records show that you are taking the  medicines listed below. If these are incorrect, please call your family doctor or clinic.        Dose / Directions Last dose taken    PROBIOTIC ADVANCED PO        Take by mouth daily   Refills:  0        TYLENOL PO   Dose:  1000 mg        Take 1,000 mg by mouth every 8 hours as needed for mild pain or fever   Refills:  0          ASK your doctor about these medications        Dose / Directions Last dose taken    * albuterol 108 (90 BASE) MCG/ACT Inhaler   Commonly known as:  PROAIR HFA/PROVENTIL HFA/VENTOLIN HFA   Dose:  2 puff   What changed:  Another medication with the same name was added. Make sure you understand how and when to take each.   Quantity:  1 Inhaler   Ask about: Which instructions should I use?        Inhale 2 puffs into the lungs every 6 hours as needed for shortness of breath / dyspnea or wheezing   Refills:  0        * albuterol 108 (90 BASE) MCG/ACT Inhaler   Commonly known as:  PROAIR HFA/PROVENTIL HFA/VENTOLIN HFA   Dose:  2 puff   What changed:  You were already taking a medication with the same name, and this prescription was added. Make sure you understand how and when to take each.   Quantity:  1 Inhaler   Ask about: Which instructions should I use?        Inhale 2 puffs into the lungs every 6 hours as needed for shortness of breath / dyspnea or wheezing   Refills:  0        * Notice:  This list has 2 medication(s) that are the same as other medications prescribed for you. Read the directions carefully, and ask your doctor or other care provider to review them with you.            Prescriptions were sent or printed at these locations (1 Prescription)                   [id number] [address] [address]    Telephone:  [phone]   Fax:  [phone]   Hours:  Closed 10-16-08 business to [facility]                E-Prescribed (1 of 1)         albuterol (PROAIR HFA/PROVENTIL HFA/VENTOLIN HFA) 108 (90 BASE) MCG/ACT  Inhaler                Orders Needing Specimen Collection     None      Pending Results     No orders found from 3/18/2017 to 3/21/2017.            Pending Culture Results     No orders found from 3/18/2017 to 3/21/2017.             Test Results from your hospital stay            Thank you for choosing [facility]       Thank you for choosing [facility] for your care. Our goal is always to provide you with excellent care. Hearing back from our patients is one way we can continue to improve our services. Please take a few minutes to complete the written survey that you may receive in the mail after you visit with us. Thank you!        [employer] Information     [employer] gives you secure access to your electronic health record. If you see a primary care provider, you can also send messages to your care team and make appointments. If you have questions, please call your primary care clinic.  If you do not have a primary care provider, please call [phone] and they will assist you.        Care EveryWhere ID     This is your Care EveryWhere ID. This could be used by other organizations to access your [facility] medical records  [id number]        After Visit Summary       This is your record. Keep this with you and show to your community pharmacist(s) and doctor(s) at your next visit.

## 2017-03-20 NOTE — ED PROVIDER NOTES
History     Chief Complaint   Patient presents with     Cough      chest pain with cough since friday     HPI  Lauren Macdonald is a 42 year old female who has had a cough for 3 days with elevated temps in the  range. She did get her flu shot. No SOA but has had some wheezing which she says is new for her although she has a previous rx for albuterol. She did have bronchitis recently.     I have reviewed the Medications, Allergies, Past Medical and Surgical History, and Social History in the Epic system.    Review of Systems   Constitutional: Negative.    HENT: Positive for congestion, postnasal drip, sinus pressure and sore throat.    Eyes: Negative.    Respiratory: Positive for cough and wheezing. Negative for choking, chest tightness, shortness of breath and stridor.    Cardiovascular: Negative.    Gastrointestinal: Negative.    Genitourinary: Negative.    Musculoskeletal: Negative.    Skin: Negative.    Neurological: Negative.        Physical Exam   BP: 127/75  Heart Rate: 90  Temp: 98.1  F (36.7  C)  Resp: 16  SpO2: 100 %  Physical Exam   Constitutional: She is oriented to person, place, and time. She appears well-developed and well-nourished. No distress.   HENT:   Right Ear: External ear normal.   Left Ear: External ear normal.   Mouth/Throat: Oropharynx is clear and moist. No oropharyngeal exudate.   Eyes: Conjunctivae and EOM are normal. Right eye exhibits no discharge. Left eye exhibits no discharge.   Neck: Neck supple.   Cardiovascular: Normal rate and regular rhythm.    No murmur heard.  Pulmonary/Chest: Effort normal and breath sounds normal. No respiratory distress. She has no wheezes. She has no rales. She exhibits no tenderness.   Abdominal: Soft.   Musculoskeletal: Normal range of motion. She exhibits no edema.   Lymphadenopathy:     She has cervical adenopathy.   Neurological: She is alert and oriented to person, place, and time. No cranial nerve deficit. She exhibits normal muscle tone.  Coordination normal.   Skin: Skin is warm. No rash noted.       ED Course     ED Course     Procedures             Labs Ordered and Resulted from Time of ED Arrival Up to the Time of Departure from the ED - No data to display    Assessments & Plan (with Medical Decision Making)   Viral uri with bronchospasm    I have reviewed the nursing notes.    I have reviewed the findings, diagnosis, plan and need for follow up with the patient.    Discharge Medication List as of 3/20/2017  1:48 PM      START taking these medications    Details   !! albuterol (PROAIR HFA/PROVENTIL HFA/VENTOLIN HFA) 108 (90 BASE) MCG/ACT Inhaler Inhale 2 puffs into the lungs every 6 hours as needed for shortness of breath / dyspnea or wheezing, Disp-1 Inhaler, R-0, E-Prescribe       !! - Potential duplicate medications found. Please discuss with provider.          Final diagnoses:   Acute bronchospasm       3/20/2017   [facility] EMERGENCY DEPARTMENT     Ian Mckeon, CANDICE CNP  03/20/17 1446

## 2017-03-20 NOTE — ED AVS SNAPSHOT
[facility] [place] Emergency Department    [address] [facility] [address]    Phone:  [phone]    Fax:  [phone]                                       Lauren Macdonald   MRN: [id number]    Department:  [facility] Emergency Department   Date of Visit:  3/20/2017           After Visit Summary Signature Page     I have received my discharge instructions, and my questions have been answered. I have discussed any challenges I see with this plan with the nurse or doctor.    ..........................................................................................................................................  Patient/Patient Representative Signature      ..........................................................................................................................................  Patient Representative Print Name and Relationship to Patient    ..................................................               ................................................  Date                                            Time    ..........................................................................................................................................  Reviewed by Signature/Title    ...................................................              ..............................................  Date                                                            Time

## 2017-03-22 ENCOUNTER — OFFICE VISIT (OUTPATIENT)
Dept: URGENT CARE | Facility: URGENT CARE | Age: 42
End: 2017-03-22
Payer: COMMERCIAL

## 2017-03-22 VITALS
SYSTOLIC BLOOD PRESSURE: 131 MMHG | WEIGHT: 127.8 LBS | BODY MASS INDEX: 21.94 KG/M2 | TEMPERATURE: 97.7 F | OXYGEN SATURATION: 100 % | HEART RATE: 77 BPM | DIASTOLIC BLOOD PRESSURE: 83 MMHG

## 2017-03-22 DIAGNOSIS — J20.9 ACUTE BRONCHITIS WITH SYMPTOMS > 10 DAYS: Primary | ICD-10-CM

## 2017-03-22 PROCEDURE — 99213 OFFICE O/P EST LOW 20 MIN: CPT | Performed by: NURSE PRACTITIONER

## 2017-03-22 RX ORDER — PREDNISONE 20 MG/1
40 TABLET ORAL DAILY
Qty: 10 TABLET | Refills: 0 | Status: SHIPPED | OUTPATIENT
Start: 2017-03-22 | End: 2017-03-27

## 2017-03-22 RX ORDER — BENZONATATE 200 MG/1
200 CAPSULE ORAL 3 TIMES DAILY PRN
Qty: 30 CAPSULE | Refills: 0 | Status: SHIPPED | OUTPATIENT
Start: 2017-03-22 | End: 2017-03-31

## 2017-03-22 RX ORDER — AZITHROMYCIN 250 MG/1
TABLET, FILM COATED ORAL
Qty: 6 TABLET | Refills: 0 | Status: SHIPPED | OUTPATIENT
Start: 2017-03-22 | End: 2017-03-31

## 2017-03-22 NOTE — MR AVS SNAPSHOT
After Visit Summary   3/22/2017    Lauren Macdonald    MRN: [id number]           Patient Information     Date Of Birth          1975        Visit Information        Provider Department      3/22/2017 5:55 PM Yoli Garland APRN [facility]        Today's Diagnoses     Acute bronchitis with symptoms > 10 days    -  1      Care Instructions    Increase rest and fluids. Tylenol and/or Ibuprofen for comfort. Cool mist vaporizer. If your symptoms worsen or do not resolve follow up with your primary care provider in 2 weeks and sooner if needed.        Indications for emergent return to emergency department discussed with patient, who verbalized good understanding and agreement.  Patient understands the limitations of today's evaluation.           Bronchitis, Antibiotic Treatment (Adult)    Bronchitis is an infection of the air passages (bronchial tubes) in your lungs. It often occurs when you have a cold. This illness is contagious during the first few days and is spread through the air by coughing and sneezing, or by direct contact (touching the sick person and then touching your own eyes, nose, or mouth).  Symptoms of bronchitis include cough with mucus (phlegm) and low-grade fever. Bronchitis usually lasts 7 to 14 days. Mild cases can be treated with simple home remedies. More severe infection is treated with an antibiotic.  Home care  Follow these guidelines when caring for yourself at home:    If your symptoms are severe, rest at home for the first 2 to 3 days. When you go back to your usual activities, don't let yourself get too tired.    Do not smoke. Also avoid being exposed to secondhand smoke.    You may use over-the-counter medicines to control fever or pain, unless another medicine was prescribed. (Note: If you have chronic liver or kidney disease or have ever had a stomach ulcer or gastrointestinal bleeding, talk with your healthcare provider  before using these medicines. Also talk to your provider if you are taking medicine to prevent blood clots.) Aspirin should never be given to anyone younger than 18 years of age who is ill with a viral infection or fever. It may cause severe liver or brain damage.    Your appetite may be poor, so a light diet is fine. Avoid dehydration by drinking 6 to 8 glasses of fluids per day (such as water, soft drinks, sports drinks, juices, tea, or soup). Extra fluids will help loosen secretions in the nose and lungs.    Over-the-counter cough, cold, and sore-throat medicines will not shorten the length of the illness, but they may be helpful to reduce symptoms. (Note: Do not use decongestants if you have high blood pressure.)    Finish all antibiotic medicine. Do this even if you are feeling better after only a few days.  Follow-up care  Follow up with your healthcare provider, or as advised. If you had an X-ray or ECG (electrocardiogram), a specialist will review it. You will be notified of any new findings that may affect your care.  Note: If you are age 65 or older, or if you have a chronic lung disease or condition that affects your immune system, or you smoke, talk to your healthcare provider about having pneumococcal vaccinations and a yearly influenza vaccination (flu shot).  When to seek medical advice  Call your healthcare provider right away if any of these occur:    Fever of 100.4 F (38 C) or higher    Coughing up increased amounts of colored sputum    Weakness, drowsiness, headache, facial pain, ear pain, or a stiff neck   Call 911, or get immediate medical care  Contact emergency services right away if any of these occur.    Coughing up blood    Worsening weakness, drowsiness, headache, or stiff neck    Trouble breathing, wheezing, or pain with breathing    3254-1165 [employer]. [address] [place] [address], [place] [address]. All rights reserved. This information is not intended as a substitute for  professional medical care. Always follow your healthcare professional's instructions.              Follow-ups after your visit        Follow-up notes from your care team     See patient instructions section of the AVS Return in about 2 weeks (around 4/5/2017), or if symptoms worsen or fail to improve, for Follow up with your primary care provider.      Who to contact     If you have questions or need follow up information about today's clinic visit or your schedule please contact [facility] directly at [phone].  Normal or non-critical lab and imaging results will be communicated to you by [employer], letter or phone within 4 business days after the clinic has received the results. If you do not hear from us within 7 days, please contact the clinic through [employer] or phone. If you have a critical or abnormal lab result, we will notify you by phone as soon as possible.  Submit refill requests through [employer] or call your pharmacy and they will forward the refill request to us. Please allow 3 business days for your refill to be completed.          Additional Information About Your Visit        [employer] Information     [employer] gives you secure access to your electronic health record. If you see a primary care provider, you can also send messages to your care team and make appointments. If you have questions, please call your primary care clinic.  If you do not have a primary care provider, please call [phone] and they will assist you.        Care EveryWhere ID     This is your Care EveryWhere ID. This could be used by other organizations to access your [facility] medical records  [id number]        Your Vitals Were     Pulse Temperature Pulse Oximetry BMI (Body Mass Index)          77 97.7  F (36.5  C) (Tympanic) 100% 21.94 kg/m2         Blood Pressure from Last 3 Encounters:   03/22/17 131/83   03/20/17 127/75   01/25/17 117/73    Weight from Last 3 Encounters:   03/22/17 127 lb  12.8 oz (58 kg)   01/25/17 127 lb 12.8 oz (58 kg)   12/12/16 133 lb (60.3 kg)              Today, you had the following     No orders found for display         Today's Medication Changes          These changes are accurate as of: 3/22/17  7:42 PM.  If you have any questions, ask your nurse or doctor.               Start taking these medicines.        Dose/Directions    azithromycin 250 MG tablet   Commonly known as:  ZITHROMAX Z-ELENA   Used for:  Acute bronchitis with symptoms > 10 days   Started by:  Yoli Garland APRN CNP        Take 2 tablets on day 1 and then take 1 tablet days 2-5   Quantity:  6 tablet   Refills:  0       benzonatate 200 MG capsule   Commonly known as:  TESSALON   Used for:  Acute bronchitis with symptoms > 10 days   Started by:  Yoli Garland APRN CNP        Dose:  200 mg   Take 1 capsule (200 mg) by mouth 3 times daily as needed for cough   Quantity:  30 capsule   Refills:  0       predniSONE 20 MG tablet   Commonly known as:  DELTASONE   Used for:  Acute bronchitis with symptoms > 10 days   Started by:  Yoli Garland APRN CNP        Dose:  40 mg   Take 2 tablets (40 mg) by mouth daily for 5 days   Quantity:  10 tablet   Refills:  0            Where to get your medicines      These medications were sent to [facility][id number] [facility] [address] [facility]  [address] [facility] [address]    Hours:  Closed 10-16-08 business to [facility] Phone:  [phone]     azithromycin 250 MG tablet    benzonatate 200 MG capsule    predniSONE 20 MG tablet                Primary Care Provider Office Phone # Fax #    Duke Abad [phone]       [facility] [id number] [facility] [address]        Thank you!     Thank you for choosing [facility]  for your care. Our goal is always to provide you with excellent care. Hearing back from our patients is one way we can continue to improve our  services. Please take a few minutes to complete the written survey that you may receive in the mail after your visit with us. Thank you!             Your Updated Medication List - Protect others around you: Learn how to safely use, store and throw away your medicines at www.disposemymeds.org.          This list is accurate as of: 3/22/17  7:42 PM.  Always use your most recent med list.                   Brand Name Dispense Instructions for use    * albuterol 108 (90 BASE) MCG/ACT Inhaler    PROAIR HFA/PROVENTIL HFA/VENTOLIN HFA    1 Inhaler    Inhale 2 puffs into the lungs every 6 hours as needed for shortness of breath / dyspnea or wheezing       * albuterol 108 (90 BASE) MCG/ACT Inhaler    PROAIR HFA/PROVENTIL HFA/VENTOLIN HFA    1 Inhaler    Inhale 2 puffs into the lungs every 6 hours as needed for shortness of breath / dyspnea or wheezing       azithromycin 250 MG tablet    ZITHROMAX Z-ELENA    6 tablet    Take 2 tablets on day 1 and then take 1 tablet days 2-5       benzonatate 200 MG capsule    TESSALON    30 capsule    Take 1 capsule (200 mg) by mouth 3 times daily as needed for cough       predniSONE 20 MG tablet    DELTASONE    10 tablet    Take 2 tablets (40 mg) by mouth daily for 5 days       PROBIOTIC ADVANCED PO      Take by mouth daily       TYLENOL PO      Take 1,000 mg by mouth every 8 hours as needed for mild pain or fever Reported on 3/22/2017       * Notice:  This list has 2 medication(s) that are the same as other medications prescribed for you. Read the directions carefully, and ask your doctor or other care provider to review them with you.

## 2017-03-22 NOTE — LETTER
[facility]  [address] [address]  [phone]      3/22/2017    Re: Lauren RANGEL Renae      TO WHOM IT MAY CONCERN:    Lauren Macdonald  was seen on 3-22-17.  Please excuse her today and the next 2 days due to illness.    Cordially    CANDICE García CNP  [facility]

## 2017-03-23 NOTE — PATIENT INSTRUCTIONS
Increase rest and fluids. Tylenol and/or Ibuprofen for comfort. Cool mist vaporizer. If your symptoms worsen or do not resolve follow up with your primary care provider in 2 weeks and sooner if needed.        Indications for emergent return to emergency department discussed with patient, who verbalized good understanding and agreement.  Patient understands the limitations of today's evaluation.           Bronchitis, Antibiotic Treatment (Adult)    Bronchitis is an infection of the air passages (bronchial tubes) in your lungs. It often occurs when you have a cold. This illness is contagious during the first few days and is spread through the air by coughing and sneezing, or by direct contact (touching the sick person and then touching your own eyes, nose, or mouth).  Symptoms of bronchitis include cough with mucus (phlegm) and low-grade fever. Bronchitis usually lasts 7 to 14 days. Mild cases can be treated with simple home remedies. More severe infection is treated with an antibiotic.  Home care  Follow these guidelines when caring for yourself at home:    If your symptoms are severe, rest at home for the first 2 to 3 days. When you go back to your usual activities, don't let yourself get too tired.    Do not smoke. Also avoid being exposed to secondhand smoke.    You may use over-the-counter medicines to control fever or pain, unless another medicine was prescribed. (Note: If you have chronic liver or kidney disease or have ever had a stomach ulcer or gastrointestinal bleeding, talk with your healthcare provider before using these medicines. Also talk to your provider if you are taking medicine to prevent blood clots.) Aspirin should never be given to anyone younger than 18 years of age who is ill with a viral infection or fever. It may cause severe liver or brain damage.    Your appetite may be poor, so a light diet is fine. Avoid dehydration by drinking 6 to 8 glasses of fluids per day (such as water, soft  drinks, sports drinks, juices, tea, or soup). Extra fluids will help loosen secretions in the nose and lungs.    Over-the-counter cough, cold, and sore-throat medicines will not shorten the length of the illness, but they may be helpful to reduce symptoms. (Note: Do not use decongestants if you have high blood pressure.)    Finish all antibiotic medicine. Do this even if you are feeling better after only a few days.  Follow-up care  Follow up with your healthcare provider, or as advised. If you had an X-ray or ECG (electrocardiogram), a specialist will review it. You will be notified of any new findings that may affect your care.  Note: If you are age 65 or older, or if you have a chronic lung disease or condition that affects your immune system, or you smoke, talk to your healthcare provider about having pneumococcal vaccinations and a yearly influenza vaccination (flu shot).  When to seek medical advice  Call your healthcare provider right away if any of these occur:    Fever of 100.4 F (38 C) or higher    Coughing up increased amounts of colored sputum    Weakness, drowsiness, headache, facial pain, ear pain, or a stiff neck   Call 911, or get immediate medical care  Contact emergency services right away if any of these occur.    Coughing up blood    Worsening weakness, drowsiness, headache, or stiff neck    Trouble breathing, wheezing, or pain with breathing    5222-3541 [employer]. [address] [place] [address]. All rights reserved. This information is not intended as a substitute for professional medical care. Always follow your healthcare professional's instructions.

## 2017-03-23 NOTE — PROGRESS NOTES
SUBJECTIVE:                                                    Lauren Mcadonald is a 42 year old female who presents to clinic today for the following health issues:      Chief Complaint   Patient presents with     Cough     started thursday, fever, chest congestion, headache, fevers of 100F, wheezing              Problem list and histories reviewed & adjusted, as indicated.  Additional history: as documented    Patient Active Problem List   Diagnosis     CARDIOVASCULAR SCREENING; LDL GOAL LESS THAN 160     Esophageal reflux     Anxiety     Past Surgical History:   Procedure Laterality Date     ESOPHAGOSCOPY, GASTROSCOPY, DUODENOSCOPY (EGD), COMBINED N/A 10/6/2014    Procedure: COMBINED ESOPHAGOSCOPY, GASTROSCOPY, DUODENOSCOPY (EGD), BIOPSY SINGLE OR MULTIPLE;  Surgeon: Sina Pimentel MD;  Location: [facility]     HC BREATH HYDROGEN TEST N/A 11/20/2015    Procedure: HYDROGEN BREATH TEST;  Surgeon: Jordan Davalos MD;  Location:  GI     SURGICAL HISTORY OF -   05/19/03    Postpartum tubal fimbriectomy       Social History   Substance Use Topics     Smoking status: Never Smoker     Smokeless tobacco: Never Used     Alcohol use No      Comment: rare     Family History   Problem Relation Age of Onset     Type 2 Diabetes Maternal Grandmother      Type 2 Diabetes Maternal Grandfather      and stomach problems     HEART DISEASE Maternal Grandfather      Alzheimer Disease Paternal Grandmother      CANCER Paternal Grandfather      mesothelioma     Hypertension Father      CEREBROVASCULAR DISEASE Father 65     minor     Other - See Comments Father      now: spot on lung and ? prostate     Gallbladder Disease Mother      young age, yoon         Current Outpatient Prescriptions   Medication Sig Dispense Refill     azithromycin (ZITHROMAX Z-ELENA) 250 MG tablet Take 2 tablets on day 1 and then take 1 tablet days 2-5 6 tablet 0     predniSONE (DELTASONE) 20 MG tablet Take 2 tablets (40 mg) by mouth daily for 5 days 10  tablet 0     benzonatate (TESSALON) 200 MG capsule Take 1 capsule (200 mg) by mouth 3 times daily as needed for cough 30 capsule 0     albuterol (PROAIR HFA/PROVENTIL HFA/VENTOLIN HFA) 108 (90 BASE) MCG/ACT Inhaler Inhale 2 puffs into the lungs every 6 hours as needed for shortness of breath / dyspnea or wheezing 1 Inhaler 0     albuterol (PROAIR HFA/PROVENTIL HFA/VENTOLIN HFA) 108 (90 BASE) MCG/ACT Inhaler Inhale 2 puffs into the lungs every 6 hours as needed for shortness of breath / dyspnea or wheezing 1 Inhaler 0     Probiotic Product (PROBIOTIC ADVANCED PO) Take by mouth daily       Acetaminophen (TYLENOL PO) Take 1,000 mg by mouth every 8 hours as needed for mild pain or fever Reported on 3/22/2017       Allergies   Allergen Reactions     Omnicef [Cefdinir] GI Disturbance     Labs reviewed in EPIC    Reviewed and updated as needed this visit by clinical staff  Tobacco  Allergies  Meds  Problems  Med Hx  Surg Hx  Fam Hx  Soc Hx        Reviewed and updated as needed this visit by Provider  Allergies  Meds  Problems         ROS:  Constitutional, HEENT, cardiovascular, pulmonary, GI, , musculoskeletal, neuro, skin, endocrine and psych systems are negative, except as otherwise noted.    OBJECTIVE:                                                    /83  Pulse 77  Temp 97.7  F (36.5  C) (Tympanic)  Wt 127 lb 12.8 oz (58 kg)  SpO2 100%  BMI 21.94 kg/m2  Body mass index is 21.94 kg/(m^2).  GENERAL: healthy, alert and no distress, nontoxic in appearance  EYES: Eyes grossly normal to inspection, PERRL and conjunctivae and sclerae normal  HENT: ear canals and TM's normal, nose and mouth without ulcers or lesions  NECK: no adenopathy, supple with full ROM  RESP: lungs moving air but scattered wheezes throughout with deep cough  CV: regular rate and rhythm, normal S1 S2, no S3 or S4, no murmur, click or rub, no peripheral edema   ABDOMEN: soft, nontender, no hepatosplenomegaly, no masses   MS: no  gross musculoskeletal defects noted, no edema  No rash    Diagnostic Test Results:  No results found for this or any previous visit (from the past 24 hour(s)).     ASSESSMENT/PLAN:                                                      Problem List Items Addressed This Visit     None      Visit Diagnoses     Acute bronchitis with symptoms > 10 days    -  Primary    Relevant Medications    azithromycin (ZITHROMAX Z-ELEAN) 250 MG tablet    predniSONE (DELTASONE) 20 MG tablet    benzonatate (TESSALON) 200 MG capsule               Patient Instructions   Increase rest and fluids. Tylenol and/or Ibuprofen for comfort. Cool mist vaporizer. If your symptoms worsen or do not resolve follow up with your primary care provider in 2 weeks and sooner if needed.        Indications for emergent return to emergency department discussed with patient, who verbalized good understanding and agreement.  Patient understands the limitations of today's evaluation.           Bronchitis, Antibiotic Treatment (Adult)    Bronchitis is an infection of the air passages (bronchial tubes) in your lungs. It often occurs when you have a cold. This illness is contagious during the first few days and is spread through the air by coughing and sneezing, or by direct contact (touching the sick person and then touching your own eyes, nose, or mouth).  Symptoms of bronchitis include cough with mucus (phlegm) and low-grade fever. Bronchitis usually lasts 7 to 14 days. Mild cases can be treated with simple home remedies. More severe infection is treated with an antibiotic.  Home care  Follow these guidelines when caring for yourself at home:    If your symptoms are severe, rest at home for the first 2 to 3 days. When you go back to your usual activities, don't let yourself get too tired.    Do not smoke. Also avoid being exposed to secondhand smoke.    You may use over-the-counter medicines to control fever or pain, unless another medicine was prescribed. (Note:  If you have chronic liver or kidney disease or have ever had a stomach ulcer or gastrointestinal bleeding, talk with your healthcare provider before using these medicines. Also talk to your provider if you are taking medicine to prevent blood clots.) Aspirin should never be given to anyone younger than 18 years of age who is ill with a viral infection or fever. It may cause severe liver or brain damage.    Your appetite may be poor, so a light diet is fine. Avoid dehydration by drinking 6 to 8 glasses of fluids per day (such as water, soft drinks, sports drinks, juices, tea, or soup). Extra fluids will help loosen secretions in the nose and lungs.    Over-the-counter cough, cold, and sore-throat medicines will not shorten the length of the illness, but they may be helpful to reduce symptoms. (Note: Do not use decongestants if you have high blood pressure.)    Finish all antibiotic medicine. Do this even if you are feeling better after only a few days.  Follow-up care  Follow up with your healthcare provider, or as advised. If you had an X-ray or ECG (electrocardiogram), a specialist will review it. You will be notified of any new findings that may affect your care.  Note: If you are age 65 or older, or if you have a chronic lung disease or condition that affects your immune system, or you smoke, talk to your healthcare provider about having pneumococcal vaccinations and a yearly influenza vaccination (flu shot).  When to seek medical advice  Call your healthcare provider right away if any of these occur:    Fever of 100.4 F (38 C) or higher    Coughing up increased amounts of colored sputum    Weakness, drowsiness, headache, facial pain, ear pain, or a stiff neck   Call 911, or get immediate medical care  Contact emergency services right away if any of these occur.    Coughing up blood    Worsening weakness, drowsiness, headache, or stiff neck    Trouble breathing, wheezing, or pain with breathing    4396-1962 [email or web address]. [address] [place] [address], [place] [address]. All rights reserved. This information is not intended as a substitute for professional medical care. Always follow your healthcare professional's instructions.            CANDICE García [facility]

## 2017-03-31 ENCOUNTER — RADIANT APPOINTMENT (OUTPATIENT)
Dept: GENERAL RADIOLOGY | Facility: CLINIC | Age: 42
End: 2017-03-31
Attending: NURSE PRACTITIONER
Payer: COMMERCIAL

## 2017-03-31 ENCOUNTER — OFFICE VISIT (OUTPATIENT)
Dept: URGENT CARE | Facility: URGENT CARE | Age: 42
End: 2017-03-31
Payer: COMMERCIAL

## 2017-03-31 VITALS
BODY MASS INDEX: 21.94 KG/M2 | OXYGEN SATURATION: 98 % | WEIGHT: 127.8 LBS | DIASTOLIC BLOOD PRESSURE: 87 MMHG | HEART RATE: 81 BPM | TEMPERATURE: 96.3 F | RESPIRATION RATE: 16 BRPM | SYSTOLIC BLOOD PRESSURE: 129 MMHG

## 2017-03-31 DIAGNOSIS — R05.9 COUGH: ICD-10-CM

## 2017-03-31 DIAGNOSIS — J20.9 ACUTE BRONCHITIS WITH SYMPTOMS > 10 DAYS: Primary | ICD-10-CM

## 2017-03-31 PROCEDURE — 94640 AIRWAY INHALATION TREATMENT: CPT | Performed by: NURSE PRACTITIONER

## 2017-03-31 PROCEDURE — 99214 OFFICE O/P EST MOD 30 MIN: CPT | Mod: 25 | Performed by: NURSE PRACTITIONER

## 2017-03-31 PROCEDURE — 71020 XR CHEST 2 VW: CPT

## 2017-03-31 RX ORDER — ALBUTEROL SULFATE 90 UG/1
2 AEROSOL, METERED RESPIRATORY (INHALATION) EVERY 6 HOURS PRN
Qty: 1 INHALER | Refills: 0 | Status: SHIPPED | OUTPATIENT
Start: 2017-03-31 | End: 2017-06-30

## 2017-03-31 RX ORDER — DOXYCYCLINE 100 MG/1
100 CAPSULE ORAL 2 TIMES DAILY
Qty: 20 CAPSULE | Refills: 0 | Status: SHIPPED | OUTPATIENT
Start: 2017-03-31 | End: 2017-06-30

## 2017-03-31 RX ORDER — PREDNISONE 20 MG/1
TABLET ORAL
Qty: 20 TABLET | Refills: 0 | Status: SHIPPED | OUTPATIENT
Start: 2017-03-31 | End: 2017-06-30

## 2017-03-31 RX ORDER — ALBUTEROL SULFATE 0.83 MG/ML
1 SOLUTION RESPIRATORY (INHALATION) ONCE
Qty: 3 ML | Refills: 0 | COMMUNITY
End: 2017-06-30

## 2017-03-31 NOTE — PROGRESS NOTES
"SUBJECTIVE:  Lauren Macdonald is a 42 year old female who presents to the clinic today with a chief complaint of cough  for 2 week(s).  Her cough is described as productive mucoid.    The patient's symptoms are mild and worsening.  Associated symptoms include congestion and nasal congestion. The patient's symptoms are exacerbated by no particular triggers  Patient has been using Recently treated with Azithromycin   to improve symptoms.    Past Medical History:   Diagnosis Date     Gastro-oesophageal reflux disease      Reaction, situational     \"depression\" during divorce       Social History   Substance Use Topics     Smoking status: Never Smoker     Smokeless tobacco: Never Used     Alcohol use No      Comment: rare         ROS  CONSTITUTIONAL:NEGATIVE for fever, chills, change in weight  INTEGUMENTARY/SKIN: NEGATIVE for worrisome rashes, moles or lesions  EYES: NEGATIVE for vision changes or irritation  ENT/MOUTH: See above   RESP:See above   CV: NEGATIVE for chest pain, palpitations or peripheral edema  MUSCULOSKELETAL: NEGATIVE for significant arthralgias or myalgia  NEURO: NEGATIVE for weakness, dizziness or paresthesias    OBJECTIVE:  /87  Pulse 81  Temp 96.3  F (35.7  C) (Tympanic)  Resp 16  Wt 127 lb 12.8 oz (58 kg)  SpO2 98%  BMI 21.94 kg/m2  GENERAL APPEARANCE: healthy, alert and no distress  EYES: EOMI,  PERRL, conjunctiva clear  HENT: ear canals and TM's normal.  Nose and mouth without ulcers, erythema or lesions  NECK: supple, nontender, no lymphadenopathy  RESP: lungs clear to auscultation - no rales, rhonchi or wheezes  CV: regular rates and rhythm, normal S1 S2, no murmur noted  ABDOMEN:  soft, nontender, no HSM or masses and bowel sounds normal  NEURO: Normal strength and tone, sensory exam grossly normal,  normal speech and mentation  SKIN: no suspicious lesions or rashes    X-RAY:   CHEST TWO VIEWS 3/31/2017 5:55 PM      HISTORY: Cough     COMPARISON: 12/8/2014.     FINDINGS: " Negative. No interval change.         IMPRESSION: Negative.  ASSESSMENT:      ICD-10-CM    1. Acute bronchitis with symptoms > 10 days J20.9 predniSONE (DELTASONE) 20 MG tablet     doxycycline (VIBRAMYCIN) 100 MG capsule     albuterol (PROAIR HFA/PROVENTIL HFA/VENTOLIN HFA) 108 (90 BASE) MCG/ACT Inhaler   2. Cough R05 INHALATION/NEBULIZER TREATMENT, INITIAL     XR Chest 2 Views     ALBUTEROL UNIT DOSE, 1 MG       PLAN:  Patient was given an in office albuterol nebulizer treatment while in the office with clearing of their wheezing.   Patient Instructions   Use Medication as directed    Hydrate with fluids, rest, cool humidifier.  May use acetaminophen, ibuprofen prn.    For your Cough   The Buckwheat Honey Dose: Given   hour Prior to Bedtime  For children age under 1 year -Do not use due to botulism risk     2-5 years -  tsp (2.5 mL)    6-11 years -1 tsp (5 mL)    12-18 years -2 tsp (10 mL)     Guaifenesin     Adult dose -Not to exceed 2.4 g (2400mg) per day    Child age 6-12 years -100 mg every 4 hr, not to exceed 1.2 g (1200mg) per day     Pediatric, 2-6 years -50 mg every 4 hr as needed, not to exceed 600 mg per day    Cough medications is not recommended in children under 2 years.  With use of cough medications have combination medications be aware of products in the cough medication you are using to avoid overdose    Follow up with PCP in 2 weeks.    Go to Emergency Room if sx worsen or change, Shortness of breath, chest pain, persistent fevers, or painful breathing occur.     Patient voiced understanding of instructions given.          Bronchitis, Antibiotic Treatment (Adult)    Bronchitis is an infection of the air passages (bronchial tubes) in your lungs. It often occurs when you have a cold. This illness is contagious during the first few days and is spread through the air by coughing and sneezing, or by direct contact (touching the sick person and then touching your own eyes, nose, or mouth).  Symptoms of  bronchitis include cough with mucus (phlegm) and low-grade fever. Bronchitis usually lasts 7 to 14 days. Mild cases can be treated with simple home remedies. More severe infection is treated with an antibiotic.  Home care  Follow these guidelines when caring for yourself at home:    If your symptoms are severe, rest at home for the first 2 to 3 days. When you go back to your usual activities, don't let yourself get too tired.    Do not smoke. Also avoid being exposed to secondhand smoke.    You may use over-the-counter medicines to control fever or pain, unless another medicine was prescribed. (Note: If you have chronic liver or kidney disease or have ever had a stomach ulcer or gastrointestinal bleeding, talk with your healthcare provider before using these medicines. Also talk to your provider if you are taking medicine to prevent blood clots.) Aspirin should never be given to anyone younger than 18 years of age who is ill with a viral infection or fever. It may cause severe liver or brain damage.    Your appetite may be poor, so a light diet is fine. Avoid dehydration by drinking 6 to 8 glasses of fluids per day (such as water, soft drinks, sports drinks, juices, tea, or soup). Extra fluids will help loosen secretions in the nose and lungs.    Over-the-counter cough, cold, and sore-throat medicines will not shorten the length of the illness, but they may be helpful to reduce symptoms. (Note: Do not use decongestants if you have high blood pressure.)    Finish all antibiotic medicine. Do this even if you are feeling better after only a few days.  Follow-up care  Follow up with your healthcare provider, or as advised. If you had an X-ray or ECG (electrocardiogram), a specialist will review it. You will be notified of any new findings that may affect your care.  Note: If you are age 65 or older, or if you have a chronic lung disease or condition that affects your immune system, or you smoke, talk to your healthcare  provider about having pneumococcal vaccinations and a yearly influenza vaccination (flu shot).  When to seek medical advice  Call your healthcare provider right away if any of these occur:    Fever of 100.4 F (38 C) or higher    Coughing up increased amounts of colored sputum    Weakness, drowsiness, headache, facial pain, ear pain, or a stiff neck   Call 911, or get immediate medical care  Contact emergency services right away if any of these occur.    Coughing up blood    Worsening weakness, drowsiness, headache, or stiff neck    Trouble breathing, wheezing, or pain with breathing    [id number] [employer]. [address] [place] [address]. All rights reserved. This information is not intended as a substitute for professional medical care. Always follow your healthcare professional's instructions.              CANDICE Aquino CNP

## 2017-03-31 NOTE — PATIENT INSTRUCTIONS
Use Medication as directed    Hydrate with fluids, rest, cool humidifier.  May use acetaminophen, ibuprofen prn.    For your Cough   The Buckwheat Honey Dose: Given   hour Prior to Bedtime  For children age under 1 year -Do not use due to botulism risk     2-5 years -  tsp (2.5 mL)    6-11 years -1 tsp (5 mL)    12-18 years -2 tsp (10 mL)     Guaifenesin     Adult dose -Not to exceed 2.4 g (2400mg) per day    Child age 6-12 years -100 mg every 4 hr, not to exceed 1.2 g (1200mg) per day     Pediatric, 2-6 years -50 mg every 4 hr as needed, not to exceed 600 mg per day    Cough medications is not recommended in children under 2 years.  With use of cough medications have combination medications be aware of products in the cough medication you are using to avoid overdose    Follow up with PCP in 2 weeks.    Go to Emergency Room if sx worsen or change, Shortness of breath, chest pain, persistent fevers, or painful breathing occur.     Patient voiced understanding of instructions given.          Bronchitis, Antibiotic Treatment (Adult)    Bronchitis is an infection of the air passages (bronchial tubes) in your lungs. It often occurs when you have a cold. This illness is contagious during the first few days and is spread through the air by coughing and sneezing, or by direct contact (touching the sick person and then touching your own eyes, nose, or mouth).  Symptoms of bronchitis include cough with mucus (phlegm) and low-grade fever. Bronchitis usually lasts 7 to 14 days. Mild cases can be treated with simple home remedies. More severe infection is treated with an antibiotic.  Home care  Follow these guidelines when caring for yourself at home:    If your symptoms are severe, rest at home for the first 2 to 3 days. When you go back to your usual activities, don't let yourself get too tired.    Do not smoke. Also avoid being exposed to secondhand smoke.    You may use over-the-counter medicines to control fever or pain,  unless another medicine was prescribed. (Note: If you have chronic liver or kidney disease or have ever had a stomach ulcer or gastrointestinal bleeding, talk with your healthcare provider before using these medicines. Also talk to your provider if you are taking medicine to prevent blood clots.) Aspirin should never be given to anyone younger than 18 years of age who is ill with a viral infection or fever. It may cause severe liver or brain damage.    Your appetite may be poor, so a light diet is fine. Avoid dehydration by drinking 6 to 8 glasses of fluids per day (such as water, soft drinks, sports drinks, juices, tea, or soup). Extra fluids will help loosen secretions in the nose and lungs.    Over-the-counter cough, cold, and sore-throat medicines will not shorten the length of the illness, but they may be helpful to reduce symptoms. (Note: Do not use decongestants if you have high blood pressure.)    Finish all antibiotic medicine. Do this even if you are feeling better after only a few days.  Follow-up care  Follow up with your healthcare provider, or as advised. If you had an X-ray or ECG (electrocardiogram), a specialist will review it. You will be notified of any new findings that may affect your care.  Note: If you are age 65 or older, or if you have a chronic lung disease or condition that affects your immune system, or you smoke, talk to your healthcare provider about having pneumococcal vaccinations and a yearly influenza vaccination (flu shot).  When to seek medical advice  Call your healthcare provider right away if any of these occur:    Fever of 100.4 F (38 C) or higher    Coughing up increased amounts of colored sputum    Weakness, drowsiness, headache, facial pain, ear pain, or a stiff neck   Call 911, or get immediate medical care  Contact emergency services right away if any of these occur.    Coughing up blood    Worsening weakness, drowsiness, headache, or stiff neck    Trouble breathing,  wheezing, or pain with breathing    8303-2862 [employer]. [address] [place] [address], [place] [address]. All rights reserved. This information is not intended as a substitute for professional medical care. Always follow your healthcare professional's instructions.

## 2017-03-31 NOTE — LETTER
[facility]  [address] [address]  Phone: [phone]  Fax: [phone]    March 31, 2017        Lauren RANGEL Renae  [address] [address] [facility] [address]          To whom it may concern:    RE: Lauren RANGEL Renae    Patient was seen and treated today at our clinic and missed work.    Please contact me for questions or concerns.      Sincerely,        CANDICE Aquino CNP

## 2017-03-31 NOTE — LETTER
[facility]  [address] [address]  Phone: [phone]  Fax: [phone]    March 31, 2017        Lauren Macdonald  [address] [address]          To whom it may concern:    RE: Lauren Macdonald    Patient was seen and treated today at our clinic and missed work.    Can return to work if fever free.    Please contact me for questions or concerns.      Sincerely,        CANDICE Aquino CNP

## 2017-03-31 NOTE — MR AVS SNAPSHOT
After Visit Summary   3/31/2017    Lauren Macdonald    MRN: [id number]           Patient Information     Date Of Birth          1975        Visit Information        Provider Department      3/31/2017 5:10 PM HERVE SAME DAY PROVIDER [facility]        Today's Diagnoses     Cough    -  1    Acute bronchitis with symptoms > 10 days          Care Instructions    Use Medication as directed    Hydrate with fluids, rest, cool humidifier.  May use acetaminophen, ibuprofen prn.    For your Cough   The Buckwheat Honey Dose: Given   hour Prior to Bedtime  For children age under 1 year -Do not use due to botulism risk     2-5 years -  tsp (2.5 mL)    6-11 years -1 tsp (5 mL)    12-18 years -2 tsp (10 mL)     Guaifenesin     Adult dose -Not to exceed 2.4 g (2400mg) per day    Child age 6-12 years -100 mg every 4 hr, not to exceed 1.2 g (1200mg) per day     Pediatric, 2-6 years -50 mg every 4 hr as needed, not to exceed 600 mg per day    Cough medications is not recommended in children under 2 years.  With use of cough medications have combination medications be aware of products in the cough medication you are using to avoid overdose    Follow up with PCP in 2 weeks.    Go to Emergency Room if sx worsen or change, Shortness of breath, chest pain, persistent fevers, or painful breathing occur.     Patient voiced understanding of instructions given.          Bronchitis, Antibiotic Treatment (Adult)    Bronchitis is an infection of the air passages (bronchial tubes) in your lungs. It often occurs when you have a cold. This illness is contagious during the first few days and is spread through the air by coughing and sneezing, or by direct contact (touching the sick person and then touching your own eyes, nose, or mouth).  Symptoms of bronchitis include cough with mucus (phlegm) and low-grade fever. Bronchitis usually lasts 7 to 14 days. Mild cases can be treated with simple home  remedies. More severe infection is treated with an antibiotic.  Home care  Follow these guidelines when caring for yourself at home:    If your symptoms are severe, rest at home for the first 2 to 3 days. When you go back to your usual activities, don't let yourself get too tired.    Do not smoke. Also avoid being exposed to secondhand smoke.    You may use over-the-counter medicines to control fever or pain, unless another medicine was prescribed. (Note: If you have chronic liver or kidney disease or have ever had a stomach ulcer or gastrointestinal bleeding, talk with your healthcare provider before using these medicines. Also talk to your provider if you are taking medicine to prevent blood clots.) Aspirin should never be given to anyone younger than 18 years of age who is ill with a viral infection or fever. It may cause severe liver or brain damage.    Your appetite may be poor, so a light diet is fine. Avoid dehydration by drinking 6 to 8 glasses of fluids per day (such as water, soft drinks, sports drinks, juices, tea, or soup). Extra fluids will help loosen secretions in the nose and lungs.    Over-the-counter cough, cold, and sore-throat medicines will not shorten the length of the illness, but they may be helpful to reduce symptoms. (Note: Do not use decongestants if you have high blood pressure.)    Finish all antibiotic medicine. Do this even if you are feeling better after only a few days.  Follow-up care  Follow up with your healthcare provider, or as advised. If you had an X-ray or ECG (electrocardiogram), a specialist will review it. You will be notified of any new findings that may affect your care.  Note: If you are age 65 or older, or if you have a chronic lung disease or condition that affects your immune system, or you smoke, talk to your healthcare provider about having pneumococcal vaccinations and a yearly influenza vaccination (flu shot).  When to seek medical advice  Call your healthcare  provider right away if any of these occur:    Fever of 100.4 F (38 C) or higher    Coughing up increased amounts of colored sputum    Weakness, drowsiness, headache, facial pain, ear pain, or a stiff neck   Call 911, or get immediate medical care  Contact emergency services right away if any of these occur.    Coughing up blood    Worsening weakness, drowsiness, headache, or stiff neck    Trouble breathing, wheezing, or pain with breathing    9997-8370 [employer]. [address] [place] [address]. All rights reserved. This information is not intended as a substitute for professional medical care. Always follow your healthcare professional's instructions.              Follow-ups after your visit        Who to contact     If you have questions or need follow up information about today's clinic visit or your schedule please contact [facility] directly at [phone].  Normal or non-critical lab and imaging results will be communicated to you by Appianhart, letter or phone within 4 business days after the clinic has received the results. If you do not hear from us within 7 days, please contact the clinic through Appianhart or phone. If you have a critical or abnormal lab result, we will notify you by phone as soon as possible.  Submit refill requests through Tant[employer] or call your pharmacy and they will forward the refill request to us. Please allow 3 business days for your refill to be completed.          Additional Information About Your Visit        MyChart Information     [employer] gives you secure access to your electronic health record. If you see a primary care provider, you can also send messages to your care team and make appointments. If you have questions, please call your primary care clinic.  If you do not have a primary care provider, please call [phone] and they will assist you.        Care EveryWhere ID     This is your Care EveryWhere ID. This could  be used by other organizations to access your [place] medical records  [id number]        Your Vitals Were     Pulse Temperature Respirations Pulse Oximetry BMI (Body Mass Index)       81 96.3  F (35.7  C) (Tympanic) 16 98% 21.94 kg/m2        Blood Pressure from Last 3 Encounters:   03/31/17 129/87   03/22/17 131/83   03/20/17 127/75    Weight from Last 3 Encounters:   03/31/17 127 lb 12.8 oz (58 kg)   03/22/17 127 lb 12.8 oz (58 kg)   01/25/17 127 lb 12.8 oz (58 kg)              We Performed the Following     ALBUTEROL UNIT DOSE, 1 MG     INHALATION/NEBULIZER TREATMENT, INITIAL          Today's Medication Changes          These changes are accurate as of: 3/31/17  6:05 PM.  If you have any questions, ask your nurse or doctor.               Start taking these medicines.        Dose/Directions    doxycycline 100 MG capsule   Commonly known as:  VIBRAMYCIN   Used for:  Acute bronchitis with symptoms > 10 days        Dose:  100 mg   Take 1 capsule (100 mg) by mouth 2 times daily   Quantity:  20 capsule   Refills:  0       predniSONE 20 MG tablet   Commonly known as:  DELTASONE   Used for:  Acute bronchitis with symptoms > 10 days        Take 3 tabs (60 mg) by mouth daily x 1 days, 2 tabs (40 mg) daily x 4 days, 1 tab (20 mg) daily x 3 days, then 1/2 tab (10 mg) x 3 days.   Quantity:  20 tablet   Refills:  0            Where to get your medicines      These medications were sent to [id number] [address] [facility]  [address] [facility] [address]    Hours:  Closed 10-16-08 business to [facility] Phone:  [phone]     doxycycline 100 MG capsule         Some of these will need a paper prescription and others can be bought over the counter.  Ask your nurse if you have questions.     Bring a paper prescription for each of these medications     predniSONE 20 MG tablet                Primary Care Provider Office Phone # Fax #    Duke Abad [phone]        [facility] [address] [facility] [address]        Thank you!     Thank you for choosing [facility]  for your care. Our goal is always to provide you with excellent care. Hearing back from our patients is one way we can continue to improve our services. Please take a few minutes to complete the written survey that you may receive in the mail after your visit with us. Thank you!             Your Updated Medication List - Protect others around you: Learn how to safely use, store and throw away your medicines at www.disposemymeds.org.          This list is accurate as of: 3/31/17  6:05 PM.  Always use your most recent med list.                   Brand Name Dispense Instructions for use    albuterol (2.5 MG/3ML) 0.083% neb solution     3 mL    Take 1 vial (2.5 mg) by nebulization once       doxycycline 100 MG capsule    VIBRAMYCIN    20 capsule    Take 1 capsule (100 mg) by mouth 2 times daily       predniSONE 20 MG tablet    DELTASONE    20 tablet    Take 3 tabs (60 mg) by mouth daily x 1 days, 2 tabs (40 mg) daily x 4 days, 1 tab (20 mg) daily x 3 days, then 1/2 tab (10 mg) x 3 days.       PROBIOTIC ADVANCED PO      Take by mouth daily       TYLENOL PO      Take 1,000 mg by mouth every 8 hours as needed for mild pain or fever Reported on 3/31/2017

## 2017-06-30 ENCOUNTER — RESULT FOLLOW UP (OUTPATIENT)
Dept: FAMILY MEDICINE | Facility: CLINIC | Age: 42
End: 2017-06-30

## 2017-06-30 ENCOUNTER — OFFICE VISIT (OUTPATIENT)
Dept: FAMILY MEDICINE | Facility: CLINIC | Age: 42
End: 2017-06-30
Payer: COMMERCIAL

## 2017-06-30 VITALS
HEART RATE: 64 BPM | DIASTOLIC BLOOD PRESSURE: 80 MMHG | SYSTOLIC BLOOD PRESSURE: 118 MMHG | TEMPERATURE: 97.2 F | RESPIRATION RATE: 14 BRPM | WEIGHT: 130.8 LBS | BODY MASS INDEX: 22.45 KG/M2

## 2017-06-30 DIAGNOSIS — R87.610 ASCUS WITH POSITIVE HIGH RISK HPV CERVICAL: ICD-10-CM

## 2017-06-30 DIAGNOSIS — R87.810 ASCUS WITH POSITIVE HIGH RISK HPV CERVICAL: ICD-10-CM

## 2017-06-30 DIAGNOSIS — N92.0 MENORRHAGIA WITH REGULAR CYCLE: Primary | ICD-10-CM

## 2017-06-30 LAB
BETA HCG QUAL IFA URINE: NEGATIVE
HGB BLD-MCNC: 12.2 G/DL (ref 11.7–15.7)

## 2017-06-30 PROCEDURE — G0124 SCREEN C/V THIN LAYER BY MD: HCPCS | Performed by: FAMILY MEDICINE

## 2017-06-30 PROCEDURE — 99213 OFFICE O/P EST LOW 20 MIN: CPT | Performed by: FAMILY MEDICINE

## 2017-06-30 PROCEDURE — 85018 HEMOGLOBIN: CPT | Performed by: FAMILY MEDICINE

## 2017-06-30 PROCEDURE — 84703 CHORIONIC GONADOTROPIN ASSAY: CPT | Performed by: FAMILY MEDICINE

## 2017-06-30 PROCEDURE — 87624 HPV HI-RISK TYP POOLED RSLT: CPT | Performed by: FAMILY MEDICINE

## 2017-06-30 PROCEDURE — 36415 COLL VENOUS BLD VENIPUNCTURE: CPT | Performed by: FAMILY MEDICINE

## 2017-06-30 PROCEDURE — G0145 SCR C/V CYTO,THINLAYER,RESCR: HCPCS | Performed by: FAMILY MEDICINE

## 2017-06-30 NOTE — PROGRESS NOTES
SUBJECTIVE:                                                    Lauren Macdonald is a 42 year old female who presents to clinic today for the following health issues:  Chief Complaint   Patient presents with     Abnormal Uterine Bleeding      Heavy menstrual cycles      Duration: Over last 5 months    Description (location/character/radiation): Getting worse each month with cramping, clots, and heavy bleeding. Currently having normal period time right now but having bleeding but no clots because at the end of week.    Intensity:  2/10    Accompanying signs and symptoms: none    History (similar episodes/previous evaluation): History of having heavy menstrual cycles.    Precipitating or alleviating factors: None    Therapies tried and outcome: Ibuprofen - no relief, Tylenol - no relief, heat pads     Menses have been changing gradually.  Menses regular q 28 days.  Flow for 7 days.  Heavier and more cramping, particularly the first 3 days.   OTC meds not helping much.    Seems like more clotting.   Had a tubal ligation in 2003.   Tried oral contraceptive years ago for bleeding.  Seemed like she continued to bleed.  Tried two different oral contraceptive, Seasonale and Lo Ovral.  Neither seemed to help much.   Sometimes pain with intercourse.  Has been present for years.  INtermittent.     Has not noted other health problems.     Patient Active Problem List   Diagnosis     CARDIOVASCULAR SCREENING; LDL GOAL LESS THAN 160     Esophageal reflux     Anxiety      ROS:Resp: No coughing, wheezing or shortness of breath, not needing any albuteral inhaler-used for bronchitis infection only.   GI: No nausea, vomiting,  heartburn, abdominal pain, diarrhea, constipation or change in bowel habits, doing well since taking probiotic.   : No urinary frequency or dysuria, bladder or kidney problems    OBJECTIVE:Blood pressure 118/80, pulse 64, temperature 97.2  F (36.2  C), temperature source Tympanic, resp. rate 14, weight 130  lb 12.8 oz (59.3 kg). BMI=Body mass index is 22.45 kg/(m^2).  GENERAL APPEARANCE ADULT: Alert, no acute distress   (female): external genitalia, urethral meatus, vagina, and cervix are within normal limits, bimanual exam reveals normal uterus and adnexa, PAP smear obtained today.  Old vaginal blood present.   Uterus mildly tender but normal size.      ASSESSMENT:   (N92.0) Menorrhagia with regular cycle  (primary encounter diagnosis)  Comment: heavier bleeding with menses.    Plan: US Pelvic Complete w Transvaginal, Hemoglobin          Schedule an appointment with diagnostics for a pelvic ultrasound.   Call [phone] to schedule.  This will look for tumors, thickened lining of uterus, polyps or fibroids.     If ultrasound is OK, we could consider several treatments to slow bleeding.   We could try oral contraceptive but they have not worked well in the past.   Sometimes an IUD or progesterone only hormones can help.    We could consult OB/GYN for consideration of uterine procedure like ablation.

## 2017-06-30 NOTE — MR AVS SNAPSHOT
After Visit Summary   6/30/2017    Lauren Macdonald    MRN: [id number]           Patient Information     Date Of Birth          1975        Visit Information        Provider Department      6/30/2017 11:00 AM Duke Abad MD [facility]        Today's Diagnoses     Menorrhagia with regular cycle    -  1      Care Instructions    ASSESSMENT:   (N92.0) Menorrhagia with regular cycle  (primary encounter diagnosis)  Comment: heavier bleeding with menses.    Plan: US Pelvic Complete w Transvaginal, Hemoglobin          Schedule an appointment with diagnostics for a pelvic ultrasound.   Call [phone] to schedule.  This will look for tumors, thickened lining of uterus, polyps or fibroids.     If ultrasound is OK, we could consider several treatments to slow bleeding.   We could try oral contraceptive but they have not worked well in the past.   Sometimes an IUD or progesterone only hormones can help.    We could consult OB/GYN for consideration of uterine procedure like ablation.             Follow-ups after your visit        Future tests that were ordered for you today     Open Future Orders        Priority Expected Expires Ordered    US Pelvic Complete w Transvaginal Routine  6/30/2018 6/30/2017            Who to contact     If you have questions or need follow up information about today's clinic visit or your schedule please contact [facility] directly at [phone].  Normal or non-critical lab and imaging results will be communicated to you by MyChart, letter or phone within 4 business days after the clinic has received the results. If you do not hear from us within 7 days, please contact the clinic through MyChart or phone. If you have a critical or abnormal lab result, we will notify you by phone as soon as possible.  Submit refill requests through [employer]r or call your pharmacy and they will forward the refill request to us. Please allow 3 business  days for your refill to be completed.          Additional Information About Your Visit        MyChart Information     [employer] gives you secure access to your electronic health record. If you see a primary care provider, you can also send messages to your care team and make appointments. If you have questions, please call your primary care clinic.  If you do not have a primary care provider, please call [phone] and they will assist you.        Care EveryWhere ID     This is your Care EveryWhere ID. This could be used by other organizations to access your [place] medical records  [id number]        Your Vitals Were     Pulse Temperature Respirations BMI (Body Mass Index)          64 97.2  F (36.2  C) (Tympanic) 14 22.45 kg/m2         Blood Pressure from Last 3 Encounters:   06/30/17 118/80   03/31/17 129/87   03/22/17 131/83    Weight from Last 3 Encounters:   06/30/17 130 lb 12.8 oz (59.3 kg)   03/31/17 127 lb 12.8 oz (58 kg)   03/22/17 127 lb 12.8 oz (58 kg)              We Performed the Following     Beta HCG qual IFA urine     Hemoglobin          Today's Medication Changes          These changes are accurate as of: 6/30/17 11:49 AM.  If you have any questions, ask your nurse or doctor.               Stop taking these medicines if you haven't already. Please contact your care team if you have questions.     albuterol (2.5 MG/3ML) 0.083% neb solution   Stopped by:  Duke Abad MD           albuterol 108 (90 BASE) MCG/ACT Inhaler   Commonly known as:  PROAIR HFA/PROVENTIL HFA/VENTOLIN HFA   Stopped by:  Duke Abad MD                    Primary Care Provider Office Phone # Fax #    Duke Abad [phone]       [address] [address]        Equal Access to Services     MADDIE CHANEY : Oj Swan, waaxda luqadaha, qaybta kaalmada kayli, marianne falcon. So [facility] [place] [phone].    ATENCIÓN: Si  raul melissa, tiene a earl disposición servicios gratuitos de asistencia lingüística. Jacinta galdamez [phone].    We comply with applicable federal civil rights laws and [place] laws. We do not discriminate on the basis of race, color, national origin, age, disability sex, sexual orientation or gender identity.            Thank you!     Thank you for choosing [facility]  for your care. Our goal is always to provide you with excellent care. Hearing back from our patients is one way we can continue to improve our services. Please take a few minutes to complete the written survey that you may receive in the mail after your visit with us. Thank you!             Your Updated Medication List - Protect others around you: Learn how to safely use, store and throw away your medicines at www.disposemymeds.org.          This list is accurate as of: 6/30/17 11:49 AM.  Always use your most recent med list.                   Brand Name Dispense Instructions for use Diagnosis    PROBIOTIC ADVANCED PO      Take by mouth daily        TYLENOL PO      Take 1,000 mg by mouth every 8 hours as needed for mild pain or fever Reported on 3/31/2017

## 2017-06-30 NOTE — PATIENT INSTRUCTIONS
ASSESSMENT:   (N92.0) Menorrhagia with regular cycle  (primary encounter diagnosis)  Comment: heavier bleeding with menses.    Plan: US Pelvic Complete w Transvaginal, Hemoglobin          Schedule an appointment with diagnostics for a pelvic ultrasound.   Call [phone] to schedule.  This will look for tumors, thickened lining of uterus, polyps or fibroids.     If ultrasound is OK, we could consider several treatments to slow bleeding.   We could try oral contraceptive but they have not worked well in the past.   Sometimes an IUD or progesterone only hormones can help.    We could consult OB/GYN for consideration of uterine procedure like ablation.

## 2017-06-30 NOTE — NURSING NOTE
"Chief Complaint   Patient presents with     Abnormal Uterine Bleeding       Initial /80 (BP Location: Right arm, Patient Position: Chair, Cuff Size: Adult Regular)  Pulse 64  Temp 97.2  F (36.2  C) (Tympanic)  Resp 14  Wt 130 lb 12.8 oz (59.3 kg)  LMP   BMI 22.45 kg/m2 Estimated body mass index is 22.45 kg/(m^2) as calculated from the following:    Height as of 1/25/17: 5' 4\" (1.626 m).    Weight as of this encounter: 130 lb 12.8 oz (59.3 kg).  Medication Reconciliation: complete    Health Maintenance that is potentially due pending provider review:  Pap Smear    Possibly completing today per provider review. Patient is okay with having Pap done today with having menstrual cycle.    Dixie Hicks sma        "

## 2017-06-30 NOTE — LETTER
August 27, 2018      Lauren CLAIRE Renae  [address] [address] [facility] [address]    Dear MsVirgie,      This letter is to remind you that you are due for your follow up PAP smear on or about 9/6/18.    Please call [phone] to schedule your appointment at your earliest convenience.     If you have completed the tests outside of [place], please have the results forwarded to our office. We will update the chart for your primary Physician to review before your next annual physical.     Sincerely,      Duke Abad MD/aditi

## 2017-07-05 NOTE — PROGRESS NOTES
Tarik Aguilar,   Hemoglobin is normal. This is a measure of the red blood cells that carry oxygen in the blood.  No sign of anemia at this time.   Your pregnancy test was negative as we expected.   PAP test not done yet.     NAOMI GABRIEL MD

## 2017-07-07 ENCOUNTER — OFFICE VISIT (OUTPATIENT)
Dept: URGENT CARE | Facility: URGENT CARE | Age: 42
End: 2017-07-07
Payer: COMMERCIAL

## 2017-07-07 VITALS
SYSTOLIC BLOOD PRESSURE: 110 MMHG | RESPIRATION RATE: 20 BRPM | BODY MASS INDEX: 21.87 KG/M2 | TEMPERATURE: 98.1 F | WEIGHT: 127.4 LBS | DIASTOLIC BLOOD PRESSURE: 78 MMHG | HEART RATE: 76 BPM

## 2017-07-07 DIAGNOSIS — R30.0 DYSURIA: ICD-10-CM

## 2017-07-07 DIAGNOSIS — N76.0 VAGINITIS AND VULVOVAGINITIS: Primary | ICD-10-CM

## 2017-07-07 LAB
ALBUMIN UR-MCNC: NEGATIVE MG/DL
APPEARANCE UR: CLEAR
BETA HCG QUAL IFA URINE: NEGATIVE
BILIRUB UR QL STRIP: NEGATIVE
COLOR UR AUTO: YELLOW
COPATH REPORT: ABNORMAL
GLUCOSE UR STRIP-MCNC: NEGATIVE MG/DL
HGB UR QL STRIP: NEGATIVE
KETONES UR STRIP-MCNC: NEGATIVE MG/DL
LEUKOCYTE ESTERASE UR QL STRIP: NEGATIVE
MICRO REPORT STATUS: ABNORMAL
NITRATE UR QL: NEGATIVE
PAP: ABNORMAL
PH UR STRIP: 6 PH (ref 5–7)
SP GR UR STRIP: 1.02 (ref 1–1.03)
SPECIMEN SOURCE: ABNORMAL
URN SPEC COLLECT METH UR: NORMAL
UROBILINOGEN UR STRIP-ACNC: 0.2 EU/DL (ref 0.2–1)
WET PREP SPEC: ABNORMAL

## 2017-07-07 PROCEDURE — 87210 SMEAR WET MOUNT SALINE/INK: CPT | Performed by: NURSE PRACTITIONER

## 2017-07-07 PROCEDURE — 81003 URINALYSIS AUTO W/O SCOPE: CPT | Performed by: NURSE PRACTITIONER

## 2017-07-07 PROCEDURE — 84703 CHORIONIC GONADOTROPIN ASSAY: CPT | Performed by: NURSE PRACTITIONER

## 2017-07-07 PROCEDURE — 99213 OFFICE O/P EST LOW 20 MIN: CPT | Performed by: NURSE PRACTITIONER

## 2017-07-07 RX ORDER — METRONIDAZOLE 500 MG/1
500 TABLET ORAL 3 TIMES DAILY
Qty: 21 TABLET | Refills: 0 | Status: SHIPPED | OUTPATIENT
Start: 2017-07-07 | End: 2017-07-17 | Stop reason: ALTCHOICE

## 2017-07-07 NOTE — PROGRESS NOTES
"SUBJECTIVE:   Lauren Macdonald is a 42 year old female who  presents today for a possible UTI. Symptoms of dysuria and frequency have been going on for 1day(s).  Hematuria no.  gradual onsetand mild.  There is no history of fever, chills, nausea or vomiting.  This patient does not have a history of urinary tract infections. Patient denies vaginal symptoms, pregnancy or STD concerns.    Past Medical History:   Diagnosis Date     Gastro-oesophageal reflux disease      Reaction, situational     \"depression\" during divorce     Current Outpatient Prescriptions   Medication Sig Dispense Refill     Acetaminophen (TYLENOL PO) Take 1,000 mg by mouth every 8 hours as needed for mild pain or fever Reported on 3/31/2017       Probiotic Product (PROBIOTIC ADVANCED PO) Take by mouth daily           ROS:   CONSTITUTIONAL:NEGATIVE for fever, chills, change in weight  INTEGUMENTARY/SKIN: NEGATIVE for worrisome rashes, moles or lesions  EYES: NEGATIVE for vision changes or irritation  ENT/MOUTH: NEGATIVE for ear, mouth and throat problems  RESP:NEGATIVE for significant cough or SOB  CV: NEGATIVE for chest pain, palpitations or peripheral edema  GI: NEGATIVE for nausea, abdominal pain, heartburn, or change in bowel habits  MUSCULOSKELETAL: NEGATIVE for significant arthralgias or myalgia  NEURO: NEGATIVE for weakness, dizziness or paresthesias    OBJECTIVE:  /78 (BP Location: Right arm, Cuff Size: Adult Regular)  Pulse 76  Temp 98.1  F (36.7  C) (Tympanic)  Resp 20  Wt 127 lb 6.4 oz (57.8 kg)  BMI 21.87 kg/m2  GENERAL APPEARANCE: healthy, alert and no distress  RESP: lungs clear to auscultation - no rales, rhonchi or wheezes  CV: regular rates and rhythm, normal S1 S2, no murmur noted  : Deferred patient self swabbed   BACK: No CVA tenderness  SKIN: no suspicious lesions or rashes    UA:  Results for orders placed or performed in visit on 07/07/17   *UA reflex to Microscopic and Culture ([facility] " (except [place] and [place])   Result Value Ref Range    Color Urine Yellow     Appearance Urine Clear     Glucose Urine Negative NEG mg/dL    Bilirubin Urine Negative NEG    Ketones Urine Negative NEG mg/dL    Specific Gravity Urine 1.025 1.003 - 1.035    Blood Urine Negative NEG    pH Urine 6.0 5.0 - 7.0 pH    Protein Albumin Urine Negative NEG mg/dL    Urobilinogen Urine 0.2 0.2 - 1.0 EU/dL    Nitrite Urine Negative NEG    Leukocyte Esterase Urine Negative NEG    Source Midstream Urine    Beta HCG qual IFA urine   Result Value Ref Range    Beta HCG Qual IFA Urine Negative NEG   Wet prep   Result Value Ref Range    Specimen Description Vagina     Wet Prep (A)      No Trichomonas seen  Moderate Clue cells seen  No yeast seen      Micro Report Status FINAL 07/07/2017          ASSESSMENT:     ICD-10-CM    1. Vaginitis and vulvovaginitis N76.0 metroNIDAZOLE (FLAGYL) 500 MG tablet   2. Dysuria R30.0 *UA reflex to Microscopic and Culture ([facility] (except [place] and [place])     Beta HCG qual IFA urine     Wet prep         PLAN:  Patient Instructions   Antibiotics as directed   Drink plenty of fluids. Prevention and treatment of UTI's discussed.   Signs and symptoms of pyelonephritis mentioned.   RTC if any worsening symptoms or if not improving as expected.   Follow-up with your primary care provider next week and as needed.    Indications for emergent return to emergency department discussed with patient, who verbalized good understanding and agreement.  Patient understands the limitations of today's evaluation.         Bacterial Vaginosis    You have a vaginal infection called bacterial vaginosis (BV). Both good and bad bacteria are present in a healthy vagina. BV occurs when these bacteria get out of balance. The number of bad bacteria increase. And the number of good bacteria decrease.  BV may or may not cause symptoms. If symptoms do occur, they can include:    Thin, gray, milky-white,  or sometimes green discharge    Unpleasant odor or  fishy  smell    Itching, burning, or pain in or around the vagina  It is not known what causes BV, but certain factors can make the problem more likely. This can include:    Douching    Having sex with a new partner    Having sex with more than one partner  BV will sometimes go away on its own. But treatment is usually recommended. This is because untreated BV can increase the risk of more serious health problems such as:    Pelvic inflammatory disease (PID)     delivery (giving birth to a baby early if you re pregnant)    HIV and certain other sexually transmitted diseases (STDs)    Infection after surgery on the reproductive organs  Home care  General care    BV is most often treated with medicines called antibiotics. These may be given as pills or as a vaginal cream. If antibiotics are prescribed, be sure to use them exactly as directed. Also, be sure to complete all of the medicine, even if your symptoms go away.    Avoid douching or having sex during treatment.    If you have sex with a female partner, ask your healthcare provider if she should also be treated.  Prevention    Limit or avoid douching.    Avoid having sex. If you do have sex, then take steps to lower your risk:    Use condoms when having sex.    Limit the number of partners you have sex with.  Follow-up care  Follow up with your healthcare provider, or as advised.  When to seek medical advice  Call your healthcare provider right away if:    You have a fever of 100.4 F (38 C) or higher, or as directed by your provider.    Your symptoms worsen, or they don t go away within a few days of starting treatment.    You have new pain in the lower belly or pelvic region.    You have side effects that bother you or a reaction to the pills or cream you re prescribed.    You or any partners you have sex with have new symptoms, such as a rash, joint pain, or sores.  Date Last Reviewed: 2015     8183-5510 [employer]. [address] [place] [address], [place] [address]. All rights reserved. This information is not intended as a substitute for professional medical care. Always follow your healthcare professional's instructions.            CANDICE Aquino CNP

## 2017-07-07 NOTE — MR AVS SNAPSHOT
After Visit Summary   2017    Lauren Macdonald    MRN: [id number]           Patient Information     Date Of Birth          1975        Visit Information        Provider Department      2017 5:00 PM Stefania Quach APRN [facility]        Today's Diagnoses     Dysuria    -  1    Vaginitis and vulvovaginitis          Care Instructions    Antibiotics as directed   Drink plenty of fluids. Prevention and treatment of UTI's discussed.   Signs and symptoms of pyelonephritis mentioned.   RTC if any worsening symptoms or if not improving as expected.   Follow-up with your primary care provider next week and as needed.    Indications for emergent return to emergency department discussed with patient, who verbalized good understanding and agreement.  Patient understands the limitations of today's evaluation.         Bacterial Vaginosis    You have a vaginal infection called bacterial vaginosis (BV). Both good and bad bacteria are present in a healthy vagina. BV occurs when these bacteria get out of balance. The number of bad bacteria increase. And the number of good bacteria decrease.  BV may or may not cause symptoms. If symptoms do occur, they can include:    Thin, gray, milky-white, or sometimes green discharge    Unpleasant odor or  fishy  smell    Itching, burning, or pain in or around the vagina  It is not known what causes BV, but certain factors can make the problem more likely. This can include:    Douching    Having sex with a new partner    Having sex with more than one partner  BV will sometimes go away on its own. But treatment is usually recommended. This is because untreated BV can increase the risk of more serious health problems such as:    Pelvic inflammatory disease (PID)     delivery (giving birth to a baby early if you re pregnant)    HIV and certain other sexually transmitted diseases (STDs)    Infection after surgery on the  reproductive organs  Home care  General care    BV is most often treated with medicines called antibiotics. These may be given as pills or as a vaginal cream. If antibiotics are prescribed, be sure to use them exactly as directed. Also, be sure to complete all of the medicine, even if your symptoms go away.    Avoid douching or having sex during treatment.    If you have sex with a female partner, ask your healthcare provider if she should also be treated.  Prevention    Limit or avoid douching.    Avoid having sex. If you do have sex, then take steps to lower your risk:    Use condoms when having sex.    Limit the number of partners you have sex with.  Follow-up care  Follow up with your healthcare provider, or as advised.  When to seek medical advice  Call your healthcare provider right away if:    You have a fever of 100.4 F (38 C) or higher, or as directed by your provider.    Your symptoms worsen, or they don t go away within a few days of starting treatment.    You have new pain in the lower belly or pelvic region.    You have side effects that bother you or a reaction to the pills or cream you re prescribed.    You or any partners you have sex with have new symptoms, such as a rash, joint pain, or sores.  Date Last Reviewed: 7/30/2015 2000-2017 [employer]. [address] [place] [address], [place] [address]. All rights reserved. This information is not intended as a substitute for professional medical care. Always follow your healthcare professional's instructions.                Follow-ups after your visit        Your next 10 appointments already scheduled     Jul 10, 2017 10:00 AM CDT   US PELVIC COMPLETE W TRANSVAGINAL with [id number]   [facility] ([facility])    [address] [facility] [address] [phone]           Please bring a list of your medicines (including vitamins, minerals and over-the-counter drugs). Also, tell your doctor about any allergies  you may have. Wear comfortable clothes and leave your valuables at home.  Adults: Drink six 8-ounce glasses of fluid one hour before your exam. Do NOT empty your bladder.  If you need to empty your bladder before your exam, try to release only a little bit of urine. Then, drink another 8oz glass of fluid.  Children: Children who are potty trained should drink at least 4 cups (32 oz) of liquid 45 minutes to one hour prior to the exam. The child s bladder must be full in order to achieve a diagnostic exam. If your child is very uncomfortable or has an urgent need to pee, please notify a technologist; they will try to find out how much longer the wait may be and provide instructions to help relieve the pressure. Occasionally it is medically necessary to insert a urinary catheter to fill the bladder.  Please call the Imaging Department at your exam site with any questions.              Who to contact     If you have questions or need follow up information about today's clinic visit or your schedule please contact [facility] directly at [phone].  Normal or non-critical lab and imaging results will be communicated to you by [employer], letter or phone within 4 business days after the clinic has received the results. If you do not hear from us within 7 days, please contact the clinic through [employer] or phone. If you have a critical or abnormal lab result, we will notify you by phone as soon as possible.  Submit refill requests through [employer] or call your pharmacy and they will forward the refill request to us. Please allow 3 business days for your refill to be completed.          Additional Information About Your Visit        [employer] Information     [employer] gives you secure access to your electronic health record. If you see a primary care provider, you can also send messages to your care team and make appointments. If you have questions, please call your primary care clinic.  If you do  not have a primary care provider, please call [phone] and they will assist you.        Care EveryWhere ID     This is your Care EveryWhere ID. This could be used by other organizations to access your [place] medical records  [id number]        Your Vitals Were     Pulse Temperature Respirations BMI (Body Mass Index)          76 98.1  F (36.7  C) (Tympanic) 20 21.87 kg/m2         Blood Pressure from Last 3 Encounters:   07/07/17 110/78   06/30/17 118/80   03/31/17 129/87    Weight from Last 3 Encounters:   07/07/17 127 lb 6.4 oz (57.8 kg)   06/30/17 130 lb 12.8 oz (59.3 kg)   03/31/17 127 lb 12.8 oz (58 kg)              We Performed the Following     *UA reflex to Microscopic and Culture ([place] [facility] (except [place] and [place])     Beta HCG qual IFA urine     Wet prep          Today's Medication Changes          These changes are accurate as of: 7/7/17  5:51 PM.  If you have any questions, ask your nurse or doctor.               Start taking these medicines.        Dose/Directions    metroNIDAZOLE 500 MG tablet   Commonly known as:  FLAGYL   Used for:  Vaginitis and vulvovaginitis   Started by:  Stefania Quach APRN CNP        Dose:  500 mg   Take 1 tablet (500 mg) by mouth 3 times daily   Quantity:  21 tablet   Refills:  0            Where to get your medicines      These medications were sent to [id number] [address] [facility]  [address] [place] [id number]    Hours:  Closed 10-16-08 business to [facility] Phone:  [phone]     metroNIDAZOLE 500 MG tablet                Primary Care Provider Office Phone # Fax #    Duke Abad [phone]       [facility] [address] [id number] [facility] [address]        Equal Access to Services     BRIANNA CHANEY AH: Oj patricio Soalfredo, waaxda luqadaha, qaybta kaalmada ademeka, marinane falcon. So [facility] [phone].    ATENCIÓN: Malissa carter  español, tiene a earl disposición servicios gratuitos de asistencia lingüística. Jacinta galdamez [phone].    We comply with applicable federal civil rights laws and [place] laws. We do not discriminate on the basis of race, color, national origin, age, disability sex, sexual orientation or gender identity.            Thank you!     Thank you for choosing [facility]  for your care. Our goal is always to provide you with excellent care. Hearing back from our patients is one way we can continue to improve our services. Please take a few minutes to complete the written survey that you may receive in the mail after your visit with us. Thank you!             Your Updated Medication List - Protect others around you: Learn how to safely use, store and throw away your medicines at www.disposemymeds.org.          This list is accurate as of: 7/7/17  5:51 PM.  Always use your most recent med list.                   Brand Name Dispense Instructions for use Diagnosis    metroNIDAZOLE 500 MG tablet    FLAGYL    21 tablet    Take 1 tablet (500 mg) by mouth 3 times daily    Vaginitis and vulvovaginitis       PROBIOTIC ADVANCED PO      Take by mouth daily        TYLENOL PO      Take 1,000 mg by mouth every 8 hours as needed for mild pain or fever Reported on 3/31/2017

## 2017-07-07 NOTE — NURSING NOTE
"Chief Complaint   Patient presents with     UTI     couple days.  scant amount.  back pain, cramps, side pain, nausea       Initial /78 (BP Location: Right arm, Cuff Size: Adult Regular)  Pulse 76  Temp 98.1  F (36.7  C) (Tympanic)  Resp 20  Wt 127 lb 6.4 oz (57.8 kg)  BMI 21.87 kg/m2 Estimated body mass index is 21.87 kg/(m^2) as calculated from the following:    Height as of 1/25/17: 5' 4\" (1.626 m).    Weight as of this encounter: 127 lb 6.4 oz (57.8 kg).  Medication Reconciliation: complete    Health Maintenance that is potentially due pending provider review:  NONE    N/a  Sylvia Roberson M.A.          "

## 2017-07-07 NOTE — PATIENT INSTRUCTIONS
Antibiotics as directed   Drink plenty of fluids. Prevention and treatment of UTI's discussed.   Signs and symptoms of pyelonephritis mentioned.   RTC if any worsening symptoms or if not improving as expected.   Follow-up with your primary care provider next week and as needed.    Indications for emergent return to emergency department discussed with patient, who verbalized good understanding and agreement.  Patient understands the limitations of today's evaluation.         Bacterial Vaginosis    You have a vaginal infection called bacterial vaginosis (BV). Both good and bad bacteria are present in a healthy vagina. BV occurs when these bacteria get out of balance. The number of bad bacteria increase. And the number of good bacteria decrease.  BV may or may not cause symptoms. If symptoms do occur, they can include:    Thin, gray, milky-white, or sometimes green discharge    Unpleasant odor or  fishy  smell    Itching, burning, or pain in or around the vagina  It is not known what causes BV, but certain factors can make the problem more likely. This can include:    Douching    Having sex with a new partner    Having sex with more than one partner  BV will sometimes go away on its own. But treatment is usually recommended. This is because untreated BV can increase the risk of more serious health problems such as:    Pelvic inflammatory disease (PID)     delivery (giving birth to a baby early if you re pregnant)    HIV and certain other sexually transmitted diseases (STDs)    Infection after surgery on the reproductive organs  Home care  General care    BV is most often treated with medicines called antibiotics. These may be given as pills or as a vaginal cream. If antibiotics are prescribed, be sure to use them exactly as directed. Also, be sure to complete all of the medicine, even if your symptoms go away.    Avoid douching or having sex during treatment.    If you have sex with a female partner, ask your  healthcare provider if she should also be treated.  Prevention    Limit or avoid douching.    Avoid having sex. If you do have sex, then take steps to lower your risk:    Use condoms when having sex.    Limit the number of partners you have sex with.  Follow-up care  Follow up with your healthcare provider, or as advised.  When to seek medical advice  Call your healthcare provider right away if:    You have a fever of 100.4 F (38 C) or higher, or as directed by your provider.    Your symptoms worsen, or they don t go away within a few days of starting treatment.    You have new pain in the lower belly or pelvic region.    You have side effects that bother you or a reaction to the pills or cream you re prescribed.    You or any partners you have sex with have new symptoms, such as a rash, joint pain, or sores.  Date Last Reviewed: 7/30/2015 2000-2017 [employer]. [address] [place] [address], [place] [address]. All rights reserved. This information is not intended as a substitute for professional medical care. Always follow your healthcare professional's instructions.

## 2017-07-08 ENCOUNTER — HOSPITAL ENCOUNTER (EMERGENCY)
Facility: CLINIC | Age: 42
Discharge: HOME | End: 2017-07-08
Attending: FAMILY MEDICINE | Admitting: FAMILY MEDICINE
Payer: COMMERCIAL

## 2017-07-08 VITALS
TEMPERATURE: 98.3 F | OXYGEN SATURATION: 97 % | BODY MASS INDEX: 21.8 KG/M2 | WEIGHT: 127 LBS | HEART RATE: 82 BPM | SYSTOLIC BLOOD PRESSURE: 129 MMHG | DIASTOLIC BLOOD PRESSURE: 84 MMHG

## 2017-07-08 DIAGNOSIS — N76.0 BV (BACTERIAL VAGINOSIS): ICD-10-CM

## 2017-07-08 DIAGNOSIS — B96.89 BV (BACTERIAL VAGINOSIS): ICD-10-CM

## 2017-07-08 PROCEDURE — 25000125 ZZHC RX 250: Performed by: FAMILY MEDICINE

## 2017-07-08 PROCEDURE — 99213 OFFICE O/P EST LOW 20 MIN: CPT | Performed by: FAMILY MEDICINE

## 2017-07-08 PROCEDURE — 99213 OFFICE O/P EST LOW 20 MIN: CPT

## 2017-07-08 PROCEDURE — 87491 CHLMYD TRACH DNA AMP PROBE: CPT | Performed by: FAMILY MEDICINE

## 2017-07-08 PROCEDURE — 87591 N.GONORRHOEAE DNA AMP PROB: CPT | Performed by: FAMILY MEDICINE

## 2017-07-08 RX ORDER — METRONIDAZOLE 7.5 MG/G
1 GEL VAGINAL 2 TIMES DAILY
Qty: 70 G | Refills: 0 | Status: SHIPPED | OUTPATIENT
Start: 2017-07-08 | End: 2017-07-13

## 2017-07-08 RX ORDER — ONDANSETRON 4 MG/1
4 TABLET, ORALLY DISINTEGRATING ORAL ONCE
Status: COMPLETED | OUTPATIENT
Start: 2017-07-08 | End: 2017-07-08

## 2017-07-08 RX ORDER — ONDANSETRON 4 MG/1
4 TABLET, ORALLY DISINTEGRATING ORAL EVERY 6 HOURS PRN
Qty: 10 TABLET | Refills: 0 | Status: SHIPPED | OUTPATIENT
Start: 2017-07-08 | End: 2017-07-25

## 2017-07-08 RX ADMIN — ONDANSETRON 4 MG: 4 TABLET, ORALLY DISINTEGRATING ORAL at 13:04

## 2017-07-08 ASSESSMENT — ENCOUNTER SYMPTOMS
DYSURIA: 0
FEVER: 0
SINUS PRESSURE: 0
COUGH: 0
DIARRHEA: 0
CONSTIPATION: 0
ABDOMINAL PAIN: 1
CHILLS: 0
PALPITATIONS: 0
SHORTNESS OF BREATH: 0
DIAPHORESIS: 0
NAUSEA: 0
SORE THROAT: 0
BLOOD IN STOOL: 0
VOMITING: 0
HEADACHES: 0
FREQUENCY: 0
WHEEZING: 0

## 2017-07-08 NOTE — ED NOTES
Patient stated placed on flagyl yesterday for vaginitis. Wanting to be changed to another antibiotic.  States stomach cant handle flagyl.

## 2017-07-08 NOTE — DISCHARGE INSTRUCTIONS
ICD-10-CM    1. BV (bacterial vaginosis) N76.0     B96.89     stop oral flagyl and start intravaginal. return for worsening     await GC, chlamydia testing    Bacterial Vaginosis    You have a vaginal infection called bacterial vaginosis (BV). Both good and bad bacteria are present in a healthy vagina. BV occurs when these bacteria get out of balance. The number of bad bacteria increase. And the number of good bacteria decrease.  BV may or may not cause symptoms. If symptoms do occur, they can include:    Thin, gray, milky-white, or sometimes green discharge    Unpleasant odor or  fishy  smell    Itching, burning, or pain in or around the vagina  It is not known what causes BV, but certain factors can make the problem more likely. This can include:    Douching    Having sex with a new partner    Having sex with more than one partner  BV will sometimes go away on its own. But treatment is usually recommended. This is because untreated BV can increase the risk of more serious health problems such as:    Pelvic inflammatory disease (PID)     delivery (giving birth to a baby early if you re pregnant)    HIV and certain other sexually transmitted diseases (STDs)    Infection after surgery on the reproductive organs  Home care  General care    BV is most often treated with medicines called antibiotics. These may be given as pills or as a vaginal cream. If antibiotics are prescribed, be sure to use them exactly as directed. Also, be sure to complete all of the medicine, even if your symptoms go away.    Avoid douching or having sex during treatment.    If you have sex with a female partner, ask your healthcare provider if she should also be treated.  Prevention    Limit or avoid douching.    Avoid having sex. If you do have sex, then take steps to lower your risk:    Use condoms when having sex.    Limit the number of partners you have sex with.  Follow-up care  Follow up with your healthcare provider, or as  advised.  When to seek medical advice  Call your healthcare provider right away if:    You have a fever of 100.4 F (38 C) or higher, or as directed by your provider.    Your symptoms worsen, or they don t go away within a few days of starting treatment.    You have new pain in the lower belly or pelvic region.    You have side effects that bother you or a reaction to the pills or cream you re prescribed.    You or any partners you have sex with have new symptoms, such as a rash, joint pain, or sores.  Date Last Reviewed: 7/30/2015 2000-2017 [employer]. [address] [place] [address]. All rights reserved. This information is not intended as a substitute for professional medical care. Always follow your healthcare professional's instructions.

## 2017-07-08 NOTE — ED AVS SNAPSHOT
[facility] Emergency Department    [address] [facility] [address]    Phone:  [phone]    Fax:  [phone]                                       Lauren Macdonald   MRN: [id number]    Department:  [facility] Emergency Department   Date of Visit:  7/8/2017           After Visit Summary Signature Page     I have received my discharge instructions, and my questions have been answered. I have discussed any challenges I see with this plan with the nurse or doctor.    ..........................................................................................................................................  Patient/Patient Representative Signature      ..........................................................................................................................................  Patient Representative Print Name and Relationship to Patient    ..................................................               ................................................  Date                                            Time    ..........................................................................................................................................  Reviewed by Signature/Title    ...................................................              ..............................................  Date                                                            Time

## 2017-07-08 NOTE — LETTER
[facility] EMERGENCY DEPARTMENT  [address] [facility] [address]  [phone]    2017    Lauren Macdonald  [address] [facility] [address]  [phone] (home) [phone] (work)    : 1975      To Whom it may concern:    Lauren Macdonald was seen in our Emergency Department today, 2017.  I expect her condition to improve over the next  day.  She may return to work/school when improved.    Sincerely,        Ruddy Robles

## 2017-07-08 NOTE — ED AVS SNAPSHOT
[facility] Emergency Department    [address] [facility] [address]    Phone:  [phone]    Fax:  [phone]                                       Lauren Macdonald   MRN: [id number]    Department:  [facility] Emergency Department   Date of Visit:  7/8/2017           Patient Information     Date Of Birth          1975        Your diagnoses for this visit were:     BV (bacterial vaginosis) stop oral flagyl and start intravaginal. return for worsening       You were seen by Ruddy Robles MD.      Follow-up Information     Follow up with Duke Abad MD In 1 week.    Specialty:  Family Practice    Contact information:    [facility]  [address] [facility] [address]  [phone]          Follow up with [facility] Emergency Department.    Specialty:  EMERGENCY MEDICINE    Why:  As needed, If symptoms worsen    Contact information:    [address] [place] [id number] [phone]    Additional information:    The medical center is located at   [address] [place] [address]. (between [address]   [address] in [place], four miles north   of [place]).        Discharge Instructions         ICD-10-CM    1. BV (bacterial vaginosis) N76.0     B96.89     stop oral flagyl and start intravaginal. return for worsening     await GC, chlamydia testing    Bacterial Vaginosis    You have a vaginal infection called bacterial vaginosis (BV). Both good and bad bacteria are present in a healthy vagina. BV occurs when these bacteria get out of balance. The number of bad bacteria increase. And the number of good bacteria decrease.  BV may or may not cause symptoms. If symptoms do occur, they can include:    Thin, gray, milky-white, or sometimes green discharge    Unpleasant odor or  fishy  smell    Itching, burning, or pain in or around the vagina  It is not known what causes BV, but certain factors can make the problem more likely. This can include:    Douching    Having  sex with a new partner    Having sex with more than one partner  BV will sometimes go away on its own. But treatment is usually recommended. This is because untreated BV can increase the risk of more serious health problems such as:    Pelvic inflammatory disease (PID)     delivery (giving birth to a baby early if you re pregnant)    HIV and certain other sexually transmitted diseases (STDs)    Infection after surgery on the reproductive organs  Home care  General care    BV is most often treated with medicines called antibiotics. These may be given as pills or as a vaginal cream. If antibiotics are prescribed, be sure to use them exactly as directed. Also, be sure to complete all of the medicine, even if your symptoms go away.    Avoid douching or having sex during treatment.    If you have sex with a female partner, ask your healthcare provider if she should also be treated.  Prevention    Limit or avoid douching.    Avoid having sex. If you do have sex, then take steps to lower your risk:    Use condoms when having sex.    Limit the number of partners you have sex with.  Follow-up care  Follow up with your healthcare provider, or as advised.  When to seek medical advice  Call your healthcare provider right away if:    You have a fever of 100.4 F (38 C) or higher, or as directed by your provider.    Your symptoms worsen, or they don t go away within a few days of starting treatment.    You have new pain in the lower belly or pelvic region.    You have side effects that bother you or a reaction to the pills or cream you re prescribed.    You or any partners you have sex with have new symptoms, such as a rash, joint pain, or sores.  Date Last Reviewed: 2015-2017 [employer]. [address] [place] [address], [place] [address]. All rights reserved. This information is not intended as a substitute for professional medical care. Always follow your healthcare professional's  instructions.          Future Appointments        Provider Department Dept Phone Center    7/10/2017 10:00 AM [facility] ULTRASOUND RM 1 [facility] Ultrasound [phone] [facility]      24 Hour Appointment Hotline       To make an appointment at any [facility], call [phone] ([phone]). If you don't have a family doctor or clinic, we will help you find one. [facility] are conveniently located to serve the needs of you and your family.             Review of your medicines      START taking        Dose / Directions Last dose taken    metroNIDAZOLE 0.75 % vaginal gel   Commonly known as:  METROGEL   Dose:  1 applicator   Quantity:  70 g        Place 1 applicator (5 g) vaginally 2 times daily for 5 days   Refills:  0        ondansetron 4 MG ODT tab   Commonly known as:  ZOFRAN-ODT   Dose:  4 mg   Quantity:  10 tablet        Take 1 tablet (4 mg) by mouth every 6 hours as needed for nausea   Refills:  0          Our records show that you are taking the medicines listed below. If these are incorrect, please call your family doctor or clinic.        Dose / Directions Last dose taken    metroNIDAZOLE 500 MG tablet   Commonly known as:  FLAGYL   Dose:  500 mg   Quantity:  21 tablet        Take 1 tablet (500 mg) by mouth 3 times daily   Refills:  0        PROBIOTIC ADVANCED PO        Take by mouth daily   Refills:  0        TYLENOL PO   Dose:  1000 mg        Take 1,000 mg by mouth every 8 hours as needed for mild pain or fever Reported on 3/31/2017   Refills:  0                Prescriptions were sent or printed at these locations (2 Prescriptions)                   [id number] [address] [facility]   [address] [place] [address]    Telephone:  [phone]   Fax:  [phone]   Hours:  Closed 10-16-08 business to [facility]                E-Prescribed (2 of 2)         metroNIDAZOLE (METROGEL) 0.75 % vaginal gel               ondansetron  (ZOFRAN-ODT) 4 MG ODT tab                Orders Needing Specimen Collection     Ordered          07/08/17 1300  Chlamydia trachomatis PCR - ROUTINE, Prio: Routine, Needs to be Collected     Scheduled Task Status   07/08/17 1301 Collect Chlamydia trachomatis PCR Open   Order Class:  PCU Collect                07/08/17 1300  Neisseria gonorrhoea PCR - ROUTINE, Prio: Routine, Needs to be Collected     Scheduled Task Status   07/08/17 1301 Collect Neisseria gonorrhoea PCR Open   Order Class:  PCU Collect                  Pending Results     No orders found from 7/6/2017 to 7/9/2017.            Pending Culture Results     No orders found from 7/6/2017 to 7/9/2017.            Pending Results Instructions     If you had any lab results that were not finalized at the time of your Discharge, you can call the ED Lab Result RN at [phone]. You will be contacted by this team for any positive Lab results or changes in treatment. The nurses are available 7 days a week from 10A to 6:30P.  You can leave a message 24 hours per day and they will return your call.        Test Results From Your Hospital Stay               Thank you for choosing [facility]       Thank you for choosing [facility] for your care. Our goal is always to provide you with excellent care. Hearing back from our patients is one way we can continue to improve our services. Please take a few minutes to complete the written survey that you may receive in the mail after you visit with us. Thank you!        [facility] Information     [facility] gives you secure access to your electronic health record. If you see a primary care provider, you can also send messages to your care team and make appointments. If you have questions, please call your primary care clinic.  If you do not have a primary care provider, please call [phone] and they will assist you.        Care EveryWhere ID     This is your Care EveryWhere ID. This could be used by other organizations to access  your [facility] medical records  [id number]        Equal Access to Services     MADDIE CHANEY : Oj Swan, damon benjamin, jeb milan, marianne falcon. So [facility] [phone].    ATENCIÓN: Si habla español, tiene a earl disposición servicios gratuitos de asistencia lingüística. Llame al [phone].    We comply with applicable federal civil rights laws and [place] laws. We do not discriminate on the basis of race, color, national origin, age, disability sex, sexual orientation or gender identity.            After Visit Summary       This is your record. Keep this with you and show to your community pharmacist(s) and doctor(s) at your next visit.

## 2017-07-08 NOTE — ED PROVIDER NOTES
History     Chief Complaint   Patient presents with     Abdominal Pain     in clinic yesterday with same and abd cramps getting worse, no fevers, nauseated for several days     HPI  Lauren Macdonald is a 42 year old female who presents with reevaluation after having been diagnosed with bacterial vaginosis yesterday.  She is not tolerating the medication that she was prescribed which was Flagyl orally she's been vomiting due to this and would like to switch to another medication.  She was seen yesterday had testing with  urinalysis that was negative for UTI but has had some hesitancy symptoms without dysuria urgency or frequency or hematuria.  She also had suprapubic discomfort that was radiating to her low back periodically without associated vaginal discharge or bleeding.  She had a recent negative pregnancy test and also is status post tubal ligation.  History of gonorrhea chlamydia testing in January 2017 which was normal.   In a monogamous relationship for the last 2 years    I have reviewed the Medications, Allergies, Past Medical and Surgical History, and Social History in the Epic system.    Allergies:   Allergies   Allergen Reactions     Omnicef [Cefdinir] GI Disturbance         No current facility-administered medications on file prior to encounter.   Current Outpatient Prescriptions on File Prior to Encounter:  metroNIDAZOLE (FLAGYL) 500 MG tablet Take 1 tablet (500 mg) by mouth 3 times daily   Acetaminophen (TYLENOL PO) Take 1,000 mg by mouth every 8 hours as needed for mild pain or fever Reported on 3/31/2017   Probiotic Product (PROBIOTIC ADVANCED PO) Take by mouth daily       Patient Active Problem List   Diagnosis     CARDIOVASCULAR SCREENING; LDL GOAL LESS THAN 160     Esophageal reflux     Anxiety       Past Surgical History:   Procedure Laterality Date     ESOPHAGOSCOPY, GASTROSCOPY, DUODENOSCOPY (EGD), COMBINED N/A 10/6/2014    Procedure: COMBINED ESOPHAGOSCOPY, GASTROSCOPY, DUODENOSCOPY  "(EGD), BIOPSY SINGLE OR MULTIPLE;  Surgeon: Sina Pimentel MD;  Location: [facility]     HC BREATH HYDROGEN TEST N/A 11/20/2015    Procedure: HYDROGEN BREATH TEST;  Surgeon: Jordan Davalos MD;  Location:  GI     SURGICAL HISTORY OF -   05/19/03    Postpartum tubal fimbriectomy       Social History   Substance Use Topics     Smoking status: Never Smoker     Smokeless tobacco: Never Used     Alcohol use No      Comment: rare       Most Recent Immunizations   Administered Date(s) Administered     HepB-Peds 06/04/1996     Influenza Vaccine IM 3yrs+ 4 Valent IIV4 11/15/2016     Influenza Vaccine, 3 YRS +, IM (QUADRIVALENT W/PRESERVATIVES) 11/20/2014     Mantoux 09/28/1998     Rabies Imovax 10/23/1998     TD (ADULT, 7+) 09/28/1998     TDAP Vaccine (Adacel) 06/30/2008       BMI: Estimated body mass index is 21.8 kg/(m^2) as calculated from the following:    Height as of 1/25/17: 1.626 m (5' 4\").    Weight as of this encounter: 57.6 kg (127 lb).      Review of Systems   Constitutional: Negative for chills, diaphoresis and fever.   HENT: Negative for ear pain, sinus pressure and sore throat.    Eyes: Negative for visual disturbance.   Respiratory: Negative for cough, shortness of breath and wheezing.    Cardiovascular: Negative for chest pain and palpitations.   Gastrointestinal: Positive for abdominal pain. Negative for blood in stool, constipation, diarrhea, nausea and vomiting.   Genitourinary: Negative for dysuria, frequency and urgency.   Skin: Negative for rash.   Neurological: Negative for headaches.   All other systems reviewed and are negative.      Physical Exam   BP: 129/84  Pulse: 82  Temp: 98.3  F (36.8  C)  Weight: 57.6 kg (127 lb)  SpO2: 97 %  Physical Exam   Eyes: Conjunctivae are normal.   Cardiovascular: Normal rate.    Pulmonary/Chest: Effort normal. No respiratory distress. She has no wheezes. She has no rales.   Abdominal: She exhibits no distension. There is no tenderness. There is no rebound and no " guarding.   Neurological: She is alert.   Skin: No rash noted.       ED Course     ED Course     Procedures             Critical Care time:  none               Labs Ordered and Resulted from Time of ED Arrival Up to the Time of Departure from the ED - No data to display    Assessments & Plan (with Medical Decision Making)     MDM: Lauren Macdonald is a 42 year old female who presented with side effects on her Flagyl.  She is not switched to topical Flagyl intravaginally.  I'm told her that I doubted her abdominal discomfort was related to bacterial vaginosis.  I did recommend that we repeat her GC chlamydia testing.  She is given precautions for return.  This was done as an urgent care visit.      I have reviewed the nursing notes.    I have reviewed the findings, diagnosis, plan and need for follow up with the patient.       New Prescriptions    METRONIDAZOLE (METROGEL) 0.75 % VAGINAL GEL    Place 1 applicator (5 g) vaginally 2 times daily for 5 days    ONDANSETRON (ZOFRAN-ODT) 4 MG ODT TAB    Take 1 tablet (4 mg) by mouth every 6 hours as needed for nausea       Final diagnoses:   BV (bacterial vaginosis) - stop oral flagyl and start intravaginal. return for worsening       7/8/2017   [facility] EMERGENCY DEPARTMENT     Ruddy Robles MD  07/08/17 3380

## 2017-07-08 NOTE — ED NOTES
Pt having nausea and abdominal cramping.  Diagnosed with vaginitis in clinic yesterday after vaginal swab.  Abdominal cramping is unchanged since starting Flagyl and patient is having some nausea.

## 2017-07-10 ENCOUNTER — HOSPITAL ENCOUNTER (EMERGENCY)
Facility: CLINIC | Age: 42
Discharge: HOME | End: 2017-07-10
Attending: EMERGENCY MEDICINE | Admitting: EMERGENCY MEDICINE
Payer: COMMERCIAL

## 2017-07-10 ENCOUNTER — APPOINTMENT (OUTPATIENT)
Dept: CT IMAGING | Facility: CLINIC | Age: 42
End: 2017-07-10
Attending: EMERGENCY MEDICINE
Payer: COMMERCIAL

## 2017-07-10 ENCOUNTER — APPOINTMENT (OUTPATIENT)
Dept: ULTRASOUND IMAGING | Facility: CLINIC | Age: 42
End: 2017-07-10
Attending: EMERGENCY MEDICINE
Payer: COMMERCIAL

## 2017-07-10 ENCOUNTER — TELEPHONE (OUTPATIENT)
Dept: FAMILY MEDICINE | Facility: CLINIC | Age: 42
End: 2017-07-10

## 2017-07-10 VITALS
RESPIRATION RATE: 16 BRPM | WEIGHT: 127 LBS | OXYGEN SATURATION: 97 % | TEMPERATURE: 97.6 F | BODY MASS INDEX: 21.68 KG/M2 | HEART RATE: 73 BPM | DIASTOLIC BLOOD PRESSURE: 92 MMHG | SYSTOLIC BLOOD PRESSURE: 129 MMHG | HEIGHT: 64 IN

## 2017-07-10 DIAGNOSIS — N89.8 VAGINAL DISCHARGE: ICD-10-CM

## 2017-07-10 DIAGNOSIS — R10.30 LOWER ABDOMINAL PAIN: ICD-10-CM

## 2017-07-10 DIAGNOSIS — N92.0 MENORRHAGIA WITH REGULAR CYCLE: Primary | ICD-10-CM

## 2017-07-10 LAB
ALBUMIN UR-MCNC: NEGATIVE MG/DL
ANION GAP SERPL CALCULATED.3IONS-SCNC: 5 MMOL/L (ref 3–14)
APPEARANCE UR: CLEAR
BASOPHILS # BLD AUTO: 0 10E9/L (ref 0–0.2)
BASOPHILS NFR BLD AUTO: 0.4 %
BILIRUB UR QL STRIP: NEGATIVE
BUN SERPL-MCNC: 7 MG/DL (ref 7–30)
CALCIUM SERPL-MCNC: 8.6 MG/DL (ref 8.5–10.1)
CHLORIDE SERPL-SCNC: 108 MMOL/L (ref 94–109)
CO2 SERPL-SCNC: 27 MMOL/L (ref 20–32)
COLOR UR AUTO: ABNORMAL
CREAT SERPL-MCNC: 0.87 MG/DL (ref 0.52–1.04)
DIFFERENTIAL METHOD BLD: ABNORMAL
EOSINOPHIL # BLD AUTO: 0.2 10E9/L (ref 0–0.7)
EOSINOPHIL NFR BLD AUTO: 3.5 %
ERYTHROCYTE [DISTWIDTH] IN BLOOD BY AUTOMATED COUNT: 11.5 % (ref 10–15)
FINAL DIAGNOSIS: ABNORMAL
GFR SERPL CREATININE-BSD FRML MDRD: 72 ML/MIN/1.7M2
GLUCOSE SERPL-MCNC: 87 MG/DL (ref 70–99)
GLUCOSE UR STRIP-MCNC: NEGATIVE MG/DL
HCT VFR BLD AUTO: 34 % (ref 35–47)
HGB BLD-MCNC: 11.6 G/DL (ref 11.7–15.7)
HGB UR QL STRIP: NEGATIVE
HPV HR 12 DNA CVX QL NAA+PROBE: POSITIVE
HPV16 DNA SPEC QL NAA+PROBE: NEGATIVE
HPV18 DNA SPEC QL NAA+PROBE: NEGATIVE
IMM GRANULOCYTES # BLD: 0 10E9/L (ref 0–0.4)
IMM GRANULOCYTES NFR BLD: 0.2 %
KETONES UR STRIP-MCNC: NEGATIVE MG/DL
LEUKOCYTE ESTERASE UR QL STRIP: NEGATIVE
LYMPHOCYTES # BLD AUTO: 1.7 10E9/L (ref 0.8–5.3)
LYMPHOCYTES NFR BLD AUTO: 32.8 %
MCH RBC QN AUTO: 31.7 PG (ref 26.5–33)
MCHC RBC AUTO-ENTMCNC: 34.1 G/DL (ref 31.5–36.5)
MCV RBC AUTO: 93 FL (ref 78–100)
MONOCYTES # BLD AUTO: 0.4 10E9/L (ref 0–1.3)
MONOCYTES NFR BLD AUTO: 7.8 %
NEUTROPHILS # BLD AUTO: 2.9 10E9/L (ref 1.6–8.3)
NEUTROPHILS NFR BLD AUTO: 55.3 %
NITRATE UR QL: NEGATIVE
PH UR STRIP: 8.5 PH (ref 5–7)
PLATELET # BLD AUTO: 264 10E9/L (ref 150–450)
POTASSIUM SERPL-SCNC: 3.7 MMOL/L (ref 3.4–5.3)
RBC # BLD AUTO: 3.66 10E12/L (ref 3.8–5.2)
SODIUM SERPL-SCNC: 140 MMOL/L (ref 133–144)
SP GR UR STRIP: 1.01 (ref 1–1.03)
SPECIMEN DESCRIPTION: ABNORMAL
URN SPEC COLLECT METH UR: ABNORMAL
UROBILINOGEN UR STRIP-MCNC: NORMAL MG/DL (ref 0–2)
WBC # BLD AUTO: 5.2 10E9/L (ref 4–11)

## 2017-07-10 PROCEDURE — 99284 EMERGENCY DEPT VISIT MOD MDM: CPT | Performed by: EMERGENCY MEDICINE

## 2017-07-10 PROCEDURE — 25000125 ZZHC RX 250: Performed by: EMERGENCY MEDICINE

## 2017-07-10 PROCEDURE — 25000132 ZZH RX MED GY IP 250 OP 250 PS 637: Performed by: EMERGENCY MEDICINE

## 2017-07-10 PROCEDURE — 81003 URINALYSIS AUTO W/O SCOPE: CPT | Performed by: EMERGENCY MEDICINE

## 2017-07-10 PROCEDURE — 74177 CT ABD & PELVIS W/CONTRAST: CPT

## 2017-07-10 PROCEDURE — 85025 COMPLETE CBC W/AUTO DIFF WBC: CPT | Performed by: EMERGENCY MEDICINE

## 2017-07-10 PROCEDURE — 99284 EMERGENCY DEPT VISIT MOD MDM: CPT | Mod: 25

## 2017-07-10 PROCEDURE — 25000128 H RX IP 250 OP 636: Performed by: EMERGENCY MEDICINE

## 2017-07-10 PROCEDURE — 93976 VASCULAR STUDY: CPT

## 2017-07-10 PROCEDURE — 80048 BASIC METABOLIC PNL TOTAL CA: CPT | Performed by: EMERGENCY MEDICINE

## 2017-07-10 RX ORDER — IOPAMIDOL 755 MG/ML
62 INJECTION, SOLUTION INTRAVASCULAR ONCE
Status: COMPLETED | OUTPATIENT
Start: 2017-07-10 | End: 2017-07-10

## 2017-07-10 RX ORDER — OXYCODONE AND ACETAMINOPHEN 5; 325 MG/1; MG/1
1-2 TABLET ORAL EVERY 4 HOURS PRN
Qty: 6 TABLET | Refills: 0 | Status: SHIPPED | OUTPATIENT
Start: 2017-07-10 | End: 2017-07-17

## 2017-07-10 RX ADMIN — SODIUM CHLORIDE 55 ML: 9 INJECTION, SOLUTION INTRAVENOUS at 10:19

## 2017-07-10 RX ADMIN — IOPAMIDOL 62 ML: 755 INJECTION, SOLUTION INTRAVENOUS at 10:19

## 2017-07-10 RX ADMIN — IBUPROFEN 600 MG: 400 TABLET ORAL at 08:56

## 2017-07-10 ASSESSMENT — ENCOUNTER SYMPTOMS
CHILLS: 0
DYSURIA: 0
FREQUENCY: 0
NAUSEA: 0
NECK PAIN: 0
HEADACHES: 0
VOMITING: 0
BACK PAIN: 0
HEMATURIA: 0
DIZZINESS: 0
ABDOMINAL PAIN: 1
SHORTNESS OF BREATH: 0
CONFUSION: 0
FLANK PAIN: 0
FEVER: 0
CHEST TIGHTNESS: 0

## 2017-07-10 NOTE — ED AVS SNAPSHOT
[facility] Emergency Department    [address] [facility] [address]    Phone:  [phone]    Fax:  [phone]                                       Lauren Macdonald   MRN: [id number]    Department:  [facility] Emergency Department   Date of Visit:  7/10/2017           After Visit Summary Signature Page     I have received my discharge instructions, and my questions have been answered. I have discussed any challenges I see with this plan with the nurse or doctor.    ..........................................................................................................................................  Patient/Patient Representative Signature      ..........................................................................................................................................  Patient Representative Print Name and Relationship to Patient    ..................................................               ................................................  Date                                            Time    ..........................................................................................................................................  Reviewed by Signature/Title    ...................................................              ..............................................  Date                                                            Time

## 2017-07-10 NOTE — ED PROVIDER NOTES
"  History     Chief Complaint   Patient presents with     Abdominal Pain     Pt here with worsening abdominal pain since her visit yesterday. Diagnosed with BV 2 days ago.     HPI  Lauren Macdonald is a 42 year old female who presents to emergency department complaining of continued abdominal pain and vaginal discharge.  Patient has had symptoms for the last for 5 days.  She was seen in walk-in clinic at [facility] [place] [facility] and diagnosed with a bacterial vaginosis.  She was started on oral Flagyl.  She began vomiting that night and was seen again in the emergency department and switched to intravaginal Flagyl.  She had also been given Zofran for nausea.  She states she has continued to have some discharge but no having some worsening cramping abdominal pain that radiates into her back.  The pain is rated a 7 out of 10.  Does not worsen with activity.  She denies any significant nausea or vomiting at this time has been having normal stools.  She denies any chest pain or shortness of breath.  She has not had any significant urinary symptoms at this time.  Past Medical History:   Diagnosis Date     Gastro-oesophageal reflux disease      Reaction, situational     \"depression\" during divorce       No current facility-administered medications on file prior to encounter.   Current Outpatient Prescriptions on File Prior to Encounter:  metroNIDAZOLE (METROGEL) 0.75 % vaginal gel Place 1 applicator (5 g) vaginally 2 times daily for 5 days   ondansetron (ZOFRAN-ODT) 4 MG ODT tab Take 1 tablet (4 mg) by mouth every 6 hours as needed for nausea   metroNIDAZOLE (FLAGYL) 500 MG tablet Take 1 tablet (500 mg) by mouth 3 times daily   Acetaminophen (TYLENOL PO) Take 1,000 mg by mouth every 8 hours as needed for mild pain or fever Reported on 3/31/2017   Probiotic Product (PROBIOTIC ADVANCED PO) Take 1 capsule by mouth daily      Social History     Social History     Marital status: Single     Spouse name: N/A     Number of " "children: N/A     Years of education: N/A     Occupational History     Not on file.     Social History Main Topics     Smoking status: Never Smoker     Smokeless tobacco: Never Used     Alcohol use No      Comment: rare     Drug use: No     Sexual activity: Yes     Partners: Male     Birth control/ protection: Surgical      Comment: tubal ligation 2003     Other Topics Concern     Not on file     Social History Narrative       I have reviewed the Medications, Allergies, Past Medical and Surgical History, and Social History in the Epic system.         Review of Systems   Constitutional: Negative for chills and fever.   HENT: Negative for congestion.    Respiratory: Negative for chest tightness and shortness of breath.    Cardiovascular: Negative for chest pain.   Gastrointestinal: Positive for abdominal pain. Negative for nausea and vomiting.   Genitourinary: Positive for vaginal discharge. Negative for dysuria, flank pain, frequency, hematuria and urgency.   Musculoskeletal: Negative for back pain and neck pain.   Skin: Negative for rash.   Neurological: Negative for dizziness and headaches.   Psychiatric/Behavioral: Negative for confusion.       Physical Exam   BP: 130/84  Pulse: 73  Temp: 97.6  F (36.4  C)  Resp: 16  Height: 162.6 cm (5' 4\")  Weight: 57.6 kg (127 lb)  SpO2: 97 %  Physical Exam   Constitutional: She appears well-developed and well-nourished. No distress.   HENT:   Head: Normocephalic.   Mouth/Throat: Oropharynx is clear and moist.   Eyes: EOM are normal. Pupils are equal, round, and reactive to light.   Neck: Normal range of motion. Neck supple. No JVD present.   Cardiovascular: Normal rate, regular rhythm, normal heart sounds and intact distal pulses.    No murmur heard.  Pulmonary/Chest: Effort normal and breath sounds normal. She has no wheezes. She has no rales.   Abdominal: Soft. Bowel sounds are normal.   Tenderness to palpation of lower abdomen over her suprapubic and right lower quadrant " region.  No guarding no rebound bowel sounds positive.   Genitourinary:   Genitourinary Comments: Normal external genitalia, no vaginal lesions. Mild to moderate clear vaginal discharge. Mild cervical motion tenderness.    Musculoskeletal: Normal range of motion. She exhibits no tenderness.   Neurological: She is alert. She exhibits normal muscle tone.   Skin: Skin is warm and dry. No rash noted.   Psychiatric: She has a normal mood and affect.   Nursing note and vitals reviewed.      ED Course     ED Course     Procedures             Critical Care time:  none               Labs Ordered and Resulted from Time of ED Arrival Up to the Time of Departure from the ED   CBC WITH PLATELETS DIFFERENTIAL - Abnormal; Notable for the following:        Result Value    RBC Count 3.66 (*)     Hemoglobin 11.6 (*)     Hematocrit 34.0 (*)     All other components within normal limits   URINE MACROSCOPIC WITH REFLEX TO MICRO - Abnormal; Notable for the following:     pH Urine 8.5 (*)     All other components within normal limits   BASIC METABOLIC PANEL     Results for orders placed or performed during the hospital encounter of 07/10/17   Pelvic Ultrasound (US) with doppler imaging (r/o ovarian torsion)    Narrative    US PELVIS COMPLETE W TRANSVAGINAL AND DOPPLER LIMITED    7/10/2017  8:36 AM     HISTORY: Lower abdominal pain.    TECHNIQUE: Transvaginal images were performed to better evaluate the  patient's uterus, ovaries and endometrial stripe. Spectral Doppler and  wave form analysis was also performed to evaluate blood flow to the  ovaries.    COMPARISON: CT of the abdomen and pelvis performed 10/27/2016.    FINDINGS: The uterus is measured at 10.1 x 5.1 x 6.1 cm. No fibroids  are evident. Endometrial stripe measures 1.3 cm and is within normal  limits for a premenopausal patient. The right ovary is unremarkable.  The left ovary is unremarkable. No solid adnexal masses are  identified. No free pelvic fluid is present. Spectral  Doppler and  waveform analysis demonstrate arterial and venous flow to both  ovaries.      Impression    IMPRESSION: Unremarkable pelvic ultrasound. No convincing sonographic  evidence for ovarian torsion.    KESHA MOORE MD   CT Abdomen Pelvis w Contrast    Narrative    CT ABDOMEN AND PELVIS WITH CONTRAST  7/10/2017 10:30 AM     HISTORY: Right lower quadrant pain.    TECHNIQUE:  62 mL Isovue-370 IV were administered. After contrast  administration, volumetric helical sections were acquired from the  lung bases to the ischial tuberosities. Coronal images were also  reconstructed. Radiation dose for this scan was reduced using  automated exposure control, adjustment of the mA and/or kV according  to patient size, or iterative reconstruction technique.    COMPARISON: CT of the abdomen and pelvis performed 10/27/2016.     FINDINGS: No bowel obstruction. Unremarkable appendix. No convincing  evidence for colitis or diverticulitis. Small amount of nonspecific  free fluid in the pelvis. A 1.7 cm left ovarian cystic lesion may  represent a dominant follicle. Mildly prominent venous structures in  the left periuterine region have increased in prominence since the  previous exam and are of uncertain clinical significance. Small  calcified granuloma in the spleen is unchanged. The liver,  gallbladder, spleen, adrenal glands, pancreas, and kidneys are  otherwise unremarkable. No hydronephrosis. The visualized lung bases  are clear.      Impression    IMPRESSION:   1. No acute abnormality in the abdomen or pelvis. No cause for right  lower quadrant pain is identified. No evidence for appendicitis.   2. A small amount of free fluid in the pelvis is nonspecific and may  be physiologic.  3. Mildly prominent venous structures in the left periuterine region  are of uncertain clinical significance but have increased in  prominence since the previous exam.    KESHA MOORE MD         Assessments & Plan (with Medical Decision  Making)Records were reviewed . Labs were obtained along with a pelvic us with doppler. White count was not elevated and there was not a L shift. BMP is unremarkable. Patient was given ibuprofen as she did not want pain medication. Pelvic us was without abnormality. No evidence of torsion. Findings were discussed with patient she is still having some RLQ pain. Discussed possible CT scan of the abdomen and pelvis and this was obtained. Ct with no acute abnormality. Mildly prominent venous structures in the L periuterine region of uncertain significance. Findings were discussed with patient. She will follow up with her Gyn and continue the flagyl . She will return if symptoms worsen or new symptoms develop.      I have reviewed the nursing notes.    I have reviewed the findings, diagnosis, plan and need for follow up with the patient.       Discharge Medication List as of 7/10/2017 11:03 AM      START taking these medications    Details   oxyCODONE-acetaminophen (PERCOCET) 5-325 MG per tablet Take 1-2 tablets by mouth every 4 hours as needed for pain, Disp-6 tablet, R-0, Local Print             Final diagnoses:   Lower abdominal pain   Vaginal discharge       7/10/2017   [facility] EMERGENCY DEPARTMENT     Alo Calderon MD  07/12/17 0951

## 2017-07-10 NOTE — TELEPHONE ENCOUNTER
Reason for Call: Request for an order or referral:  Patient as stated in last epic visit with Jenniffer Kenny that he would refer patient to OB/GYN - phone info given to patient please place a referral - for CT results and heavy periods -

## 2017-07-10 NOTE — ED AVS SNAPSHOT
[facility] Emergency Department    [address] [facility] [address]    Phone:  [phone]    Fax:  [phone]                                       Lauren Macdonald   MRN: [id number]    Department:  [facility] Emergency Department   Date of Visit:  7/10/2017           Patient Information     Date Of Birth          1975        Your diagnoses for this visit were:     Lower abdominal pain     Vaginal discharge        You were seen by Alo Calderon MD.      Follow-up Information     Follow up with Duke Abad MD.    Specialty:  Family Practice    Why:  This week for recheck    Contact information:    [facility]  [address] [facility] [address]  [phone]          Follow up with [facility] Emergency Department.    Specialty:  EMERGENCY MEDICINE    Why:  If symptoms worsen    Contact information:    [address] [place] [id number] [phone]    Additional information:    The medical center is located at   [address]. (between [address] in [place], [place] miles north   of [place]).        Follow up with Your Ob/Gyn.    Why:  Next available for recheck        Discharge Instructions        return if symptoms worsen or new symptoms develop.  Follow-up with primary care physician next available.  Drink plenty of fluids.  Take pain medication as directed.  Here CT scan revealed mildly increased venous dilation near the uterus and you should follow up with your OB/gyn next available for further evaluation and care.  *Abdominal Pain, Unknown Cause (Female)    The exact cause of your abdominal (stomach) pain is not certain. This does not mean that this is something to worry about, or the right tests were not done. Everyone likes to know the exact cause of the problem, but sometimes with abdominal pain, there is no clear-cut cause, and this could be a good thing. The good news is that your symptoms can be treated, and  you will feel better.   Your condition does not seem serious now; however, sometimes the signs of a serious problem may take more time to appear. For this reason, it is important for you to watch for any new symptoms, problems, or worsening of your condition.  Over the next few days, the abdominal pain may come and go, or be continuous. Other common symptoms can include nausea and vomiting. Sometimes it can be difficult to tell if you feel nauseous, you may just feel bad and not associate that feeling with nausea. Constipation, diarrhea, and a fever may go along with the pain.  The pain may continue even if treated correctly over the following days. Depending on how things go, sometimes the cause can become clear and may require further or different treatment. Additional evaluations, medications, or tests may be needed.  Home care  Your health care provider may prescribe medications for pain, symptoms, or an infection.  Follow the health care provider's instructions for taking these medications.  General care    Rest until your next exam. No strenuous activities.    Try to find positions that ease discomfort. A small pillow placed on the abdomen may help relieve pain.    Something warm on your abdomen (such as a heating pad) may help, but be careful not to burn yourself.  Diet    Do not force yourself to eat, especially if having cramps, vomiting, or diarrhea.    Water is important so you do not get dehydrated. Soup may also be good. Sports drinks may also help, especially if they are not too acidic. Make sure you don't drink sugary drinks as this can make things worse. Take liquids in small amounts. Do not guzzle them.    Caffeine sometimes makes the pain and cramping worse.    Avoid dairy products if you have vomiting or diarrhea.    Don't eat large amounts at a time. Wait a few minutes between bites.    Eat a diet low in fiber (called a low-residue diet). Foods allowed include refined breads, white rice, fruit  and vegetable juices without pulp, tender meats. These foods will pass more easily through the intestine.    Avoid fried or fatty foods, dairy, alcohol and spicy foods until your symptoms go away.  Follow-up care  Follow up with your health care provider as instructed, or if your pain does not begin to improve in the next 24 hours.  When to seek medical care  Seek prompt medical care if any of the following occur:    Pain gets worse or moves to the right lower abdomen    New or worsening vomiting or diarrhea    Swelling of the abdomen    Unable to pass stool for more than three days    New fever over 101  F (38.3 C), or rising fever    Blood in vomit or bowel movements (dark red or black color)    Jaundice (yellow color of eyes and skin)    Weakness, dizziness    Chest, arm, back, neck or jaw pain    Unexpected vaginal bleeding or missed period  Call 911  Call emergency services if any of the following occur:    Trouble breathing    Confusion    Fainting or loss of consciousness    Rapid heart rate    Seizure    [id number] [employer] [facility], [address] [place] [address]. All rights reserved. This information is not intended as a substitute for professional medical care. Always follow your healthcare professional's instructions.          24 Hour Appointment Hotline       To make an appointment at any [facility], call [phone] ([phone]). If you don't have a family doctor or clinic, we will help you find one. [facility] are conveniently located to serve the needs of you and your family.             Review of your medicines      START taking        Dose / Directions Last dose taken    oxyCODONE-acetaminophen 5-325 MG per tablet   Commonly known as:  PERCOCET   Dose:  1-2 tablet   Quantity:  6 tablet        Take 1-2 tablets by mouth every 4 hours as needed for pain   Refills:  0          Our records show that you are taking the medicines listed below. If these are incorrect, please call  your family doctor or clinic.        Dose / Directions Last dose taken    metroNIDAZOLE 0.75 % vaginal gel   Commonly known as:  METROGEL   Dose:  1 applicator   Quantity:  70 g        Place 1 applicator (5 g) vaginally 2 times daily for 5 days   Refills:  0        metroNIDAZOLE 500 MG tablet   Commonly known as:  FLAGYL   Dose:  500 mg   Quantity:  21 tablet        Take 1 tablet (500 mg) by mouth 3 times daily   Refills:  0        ondansetron 4 MG ODT tab   Commonly known as:  ZOFRAN-ODT   Dose:  4 mg   Quantity:  10 tablet        Take 1 tablet (4 mg) by mouth every 6 hours as needed for nausea   Refills:  0        PROBIOTIC ADVANCED PO   Dose:  1 capsule        Take 1 capsule by mouth daily   Refills:  0        TYLENOL PO   Dose:  1000 mg        Take 1,000 mg by mouth every 8 hours as needed for mild pain or fever Reported on 3/31/2017   Refills:  0                Prescriptions were sent or printed at these locations (1 Prescription)                   Other Prescriptions                Printed at Department/Unit printer (1 of 1)         oxyCODONE-acetaminophen (PERCOCET) 5-325 MG per tablet                Procedures and tests performed during your visit     Basic metabolic panel    CBC with platelets, differential    CT Abdomen Pelvis w Contrast    Pelvic Ultrasound (US) with doppler imaging (r/o ovarian torsion)    UA reflex to Microscopic      Orders Needing Specimen Collection     None      Pending Results     Date and Time Order Name Status Description    7/10/2017 0926 CT Abdomen Pelvis w Contrast Preliminary             Pending Culture Results     No orders found from 7/8/2017 to 7/11/2017.            Pending Results Instructions     If you had any lab results that were not finalized at the time of your Discharge, you can call the ED Lab Result RN at [phone]. You will be contacted by this team for any positive Lab results or changes in treatment. The nurses are available 7 days a week from 10A to  6:30P.  You can leave a message 24 hours per day and they will return your call.        Test Results From Your Hospital Stay        7/10/2017  7:19 AM      Component Results     Component Value Ref Range & Units Status    WBC 5.2 4.0 - 11.0 10e9/L Final    RBC Count 3.66 (L) 3.8 - 5.2 10e12/L Final    Hemoglobin 11.6 (L) 11.7 - 15.7 g/dL Final    Hematocrit 34.0 (L) 35.0 - 47.0 % Final    MCV 93 78 - 100 fl Final    MCH 31.7 26.5 - 33.0 pg Final    MCHC 34.1 31.5 - 36.5 g/dL Final    RDW 11.5 10.0 - 15.0 % Final    Platelet Count 264 150 - 450 10e9/L Final    Diff Method Automated Method  Final    % Neutrophils 55.3 % Final    % Lymphocytes 32.8 % Final    % Monocytes 7.8 % Final    % Eosinophils 3.5 % Final    % Basophils 0.4 % Final    % Immature Granulocytes 0.2 % Final    Absolute Neutrophil 2.9 1.6 - 8.3 10e9/L Final    Absolute Lymphocytes 1.7 0.8 - 5.3 10e9/L Final    Absolute Monocytes 0.4 0.0 - 1.3 10e9/L Final    Absolute Eosinophils 0.2 0.0 - 0.7 10e9/L Final    Absolute Basophils 0.0 0.0 - 0.2 10e9/L Final    Abs Immature Granulocytes 0.0 0 - 0.4 10e9/L Final         7/10/2017  7:28 AM      Component Results     Component Value Ref Range & Units Status    Sodium 140 133 - 144 mmol/L Final    Potassium 3.7 3.4 - 5.3 mmol/L Final    Chloride 108 94 - 109 mmol/L Final    Carbon Dioxide 27 20 - 32 mmol/L Final    Anion Gap 5 3 - 14 mmol/L Final    Glucose 87 70 - 99 mg/dL Final    Urea Nitrogen 7 7 - 30 mg/dL Final    Creatinine 0.87 0.52 - 1.04 mg/dL Final    GFR Estimate 72 >60 mL/min/1.7m2 Final    Non  GFR Calc    GFR Estimate If Black 87 >60 mL/min/1.7m2 Final    African American GFR Calc    Calcium 8.6 8.5 - 10.1 mg/dL Final         7/10/2017 10:50 AM      Component Results     Component Value Ref Range & Units Status    Color Urine Light Yellow  Final    Appearance Urine Clear  Final    Glucose Urine Negative NEG mg/dL Final    Bilirubin Urine Negative NEG Final    Ketones Urine  Negative NEG mg/dL Final    Specific Gravity Urine 1.010 1.003 - 1.035 Final    Blood Urine Negative NEG Final    pH Urine 8.5 (H) 5.0 - 7.0 pH Final    Protein Albumin Urine Negative NEG mg/dL Final    Urobilinogen mg/dL Normal 0.0 - 2.0 mg/dL Final    Nitrite Urine Negative NEG Final    Leukocyte Esterase Urine Negative NEG Final    Source Midstream Urine  Final         7/10/2017  8:42 AM      Narrative     US PELVIS COMPLETE W TRANSVAGINAL AND DOPPLER LIMITED    7/10/2017  8:36 AM     HISTORY: Lower abdominal pain.    TECHNIQUE: Transvaginal images were performed to better evaluate the  patient's uterus, ovaries and endometrial stripe. Spectral Doppler and  wave form analysis was also performed to evaluate blood flow to the  ovaries.    COMPARISON: CT of the abdomen and pelvis performed 10/27/2016.    FINDINGS: The uterus is measured at 10.1 x 5.1 x 6.1 cm. No fibroids  are evident. Endometrial stripe measures 1.3 cm and is within normal  limits for a premenopausal patient. The right ovary is unremarkable.  The left ovary is unremarkable. No solid adnexal masses are  identified. No free pelvic fluid is present. Spectral Doppler and  waveform analysis demonstrate arterial and venous flow to both  ovaries.        Impression     IMPRESSION: Unremarkable pelvic ultrasound. No convincing sonographic  evidence for ovarian torsion.    KESHA MOORE MD         7/10/2017 10:40 AM      Narrative     CT ABDOMEN AND PELVIS WITH CONTRAST  7/10/2017 10:30 AM     HISTORY: Right lower quadrant pain.    TECHNIQUE:  62 mL Isovue-370 IV were administered. After contrast  administration, volumetric helical sections were acquired from the  lung bases to the ischial tuberosities. Coronal images were also  reconstructed. Radiation dose for this scan was reduced using  automated exposure control, adjustment of the mA and/or kV according  to patient size, or iterative reconstruction technique.    COMPARISON: CT of the abdomen and  pelvis performed 10/27/2016.     FINDINGS: No bowel obstruction. Unremarkable appendix. No convincing  evidence for colitis or diverticulitis. Small amount of nonspecific  free fluid in the pelvis. A 1.7 cm left ovarian cystic lesion may  represent a dominant follicle. Mildly prominent venous structures in  the left periuterine region have increased in prominence since the  previous exam and are of uncertain clinical significance. Small  calcified granuloma in the spleen is unchanged. The liver,  gallbladder, spleen, adrenal glands, pancreas, and kidneys are  otherwise unremarkable. No hydronephrosis. The visualized lung bases  are clear.        Impression     IMPRESSION:   1. No acute abnormality in the abdomen or pelvis. No cause for right  lower quadrant pain is identified. No evidence for appendicitis.   2. A small amount of free fluid in the pelvis is nonspecific and may  be physiologic.  3. Mildly prominent venous structures in the left periuterine region  are of uncertain clinical significance but have increased in  prominence since the previous exam.                Thank you for choosing [facility]       Thank you for choosing [facility] for your care. Our goal is always to provide you with excellent care. Hearing back from our patients is one way we can continue to improve our services. Please take a few minutes to complete the written survey that you may receive in the mail after you visit with us. Thank you!        [employer] Information     [employer] gives you secure access to your electronic health record. If you see a primary care provider, you can also send messages to your care team and make appointments. If you have questions, please call your primary care clinic.  If you do not have a primary care provider, please call [phone] and they will assist you.        Care EveryWhere ID     This is your Care EveryWhere ID. This could be used by other organizations to access your [facility]  records  [id number]        Equal Access to Services     MADDIE CHANEY : Oj Swan, damon benjamin, marianne douglas. So [facility] [phone].    ATENCIÓN: Si habla español, tiene a earl disposición servicios gratuitos de asistencia lingüística. Llame al [phone].    We comply with applicable federal civil rights laws and [place] laws. We do not discriminate on the basis of race, color, national origin, age, disability sex, sexual orientation or gender identity.            After Visit Summary       This is your record. Keep this with you and show to your community pharmacist(s) and doctor(s) at your next visit.

## 2017-07-10 NOTE — PROGRESS NOTES
6/30/17 ASCUS Pap, + HR HPV (not 16/18). Plan colp  7/11/17 Message left to return call.   7/13/17 Message left to return call. Pt does not use her MyChart.   07/25/17 EMB--benign. Plan: hysterectomy ([facility])  09/06/17 Hysterectomy--benign. Plan pap in 12 months.   8/27/18 Pap reminder letter sent (rl)  02/27/19 [facility] clinic and schedule. ([facility])  4/30/19 Patient is lost to follow-up. Routed to provider as FYI. (rl)  5/1/19 Provider acknowledged. Pap Screening discontinued. No further pap screening recommended.

## 2017-07-10 NOTE — ED NOTES
Pt voided in US- sample not obtained. Encouraged fluids to provide sample. Ibuprofen ordered and will be given for abdominal discomfort

## 2017-07-10 NOTE — LETTER
[facility] EMERGENCY DEPARTMENT  [address] [address]  [phone]    July 10, 2017    Lauren Macdonald  [address] [address]  [phone] (home) [phone] (work)    : 1975      To Whom it may concern:    Lauren Macdonald was seen in our Emergency Department today, July 10, 2017. She should return to work on 2017.    Sincerely,        Alo Calderon

## 2017-07-10 NOTE — DISCHARGE INSTRUCTIONS
return if symptoms worsen or new symptoms develop.  Follow-up with primary care physician next available.  Drink plenty of fluids.  Take pain medication as directed.  Here CT scan revealed mildly increased venous dilation near the uterus and you should follow up with your OB/gyn next available for further evaluation and care.  *Abdominal Pain, Unknown Cause (Female)    The exact cause of your abdominal (stomach) pain is not certain. This does not mean that this is something to worry about, or the right tests were not done. Everyone likes to know the exact cause of the problem, but sometimes with abdominal pain, there is no clear-cut cause, and this could be a good thing. The good news is that your symptoms can be treated, and you will feel better.   Your condition does not seem serious now; however, sometimes the signs of a serious problem may take more time to appear. For this reason, it is important for you to watch for any new symptoms, problems, or worsening of your condition.  Over the next few days, the abdominal pain may come and go, or be continuous. Other common symptoms can include nausea and vomiting. Sometimes it can be difficult to tell if you feel nauseous, you may just feel bad and not associate that feeling with nausea. Constipation, diarrhea, and a fever may go along with the pain.  The pain may continue even if treated correctly over the following days. Depending on how things go, sometimes the cause can become clear and may require further or different treatment. Additional evaluations, medications, or tests may be needed.  Home care  Your health care provider may prescribe medications for pain, symptoms, or an infection.  Follow the health care provider's instructions for taking these medications.  General care    Rest until your next exam. No strenuous activities.    Try to find positions that ease discomfort. A small pillow placed on the abdomen may help relieve pain.    Something warm on your  abdomen (such as a heating pad) may help, but be careful not to burn yourself.  Diet    Do not force yourself to eat, especially if having cramps, vomiting, or diarrhea.    Water is important so you do not get dehydrated. Soup may also be good. Sports drinks may also help, especially if they are not too acidic. Make sure you don't drink sugary drinks as this can make things worse. Take liquids in small amounts. Do not guzzle them.    Caffeine sometimes makes the pain and cramping worse.    Avoid dairy products if you have vomiting or diarrhea.    Don't eat large amounts at a time. Wait a few minutes between bites.    Eat a diet low in fiber (called a low-residue diet). Foods allowed include refined breads, white rice, fruit and vegetable juices without pulp, tender meats. These foods will pass more easily through the intestine.    Avoid fried or fatty foods, dairy, alcohol and spicy foods until your symptoms go away.  Follow-up care  Follow up with your health care provider as instructed, or if your pain does not begin to improve in the next 24 hours.  When to seek medical care  Seek prompt medical care if any of the following occur:    Pain gets worse or moves to the right lower abdomen    New or worsening vomiting or diarrhea    Swelling of the abdomen    Unable to pass stool for more than three days    New fever over 101  F (38.3 C), or rising fever    Blood in vomit or bowel movements (dark red or black color)    Jaundice (yellow color of eyes and skin)    Weakness, dizziness    Chest, arm, back, neck or jaw pain    Unexpected vaginal bleeding or missed period  Call 911  Call emergency services if any of the following occur:    Trouble breathing    Confusion    Fainting or loss of consciousness    Rapid heart rate    Seizure    4803-4416 [employer], [address], [place] [address]. All rights reserved. This information is not intended as a substitute for professional medical care. Always follow  your healthcare professional's instructions.

## 2017-07-10 NOTE — LETTER
[facility] EMERGENCY DEPARTMENT  [address] [address]  [phone]    July 10, 2017    Lauren Macdonald  [address] [address]  [phone] (home) [phone] (work)    : 1975      To Whom it may concern:    Lauren Macdonald was seen in our Emergency Department today, July 10, 2017.  I expect her condition to improve over the next *** days.  She may return to work/school when improved.    Sincerely,        Alo Calderon

## 2017-07-17 ENCOUNTER — OFFICE VISIT (OUTPATIENT)
Dept: FAMILY MEDICINE | Facility: CLINIC | Age: 42
End: 2017-07-17
Payer: COMMERCIAL

## 2017-07-17 VITALS
HEART RATE: 76 BPM | DIASTOLIC BLOOD PRESSURE: 60 MMHG | SYSTOLIC BLOOD PRESSURE: 120 MMHG | WEIGHT: 126.8 LBS | TEMPERATURE: 97.5 F | BODY MASS INDEX: 21.77 KG/M2 | RESPIRATION RATE: 18 BRPM

## 2017-07-17 DIAGNOSIS — R87.810 ASCUS WITH POSITIVE HIGH RISK HPV CERVICAL: ICD-10-CM

## 2017-07-17 DIAGNOSIS — N89.8 VAGINAL DISCHARGE: Primary | ICD-10-CM

## 2017-07-17 DIAGNOSIS — R87.610 ASCUS WITH POSITIVE HIGH RISK HPV CERVICAL: ICD-10-CM

## 2017-07-17 DIAGNOSIS — R10.31 ABDOMINAL PAIN, RIGHT LOWER QUADRANT: ICD-10-CM

## 2017-07-17 LAB
MICRO REPORT STATUS: ABNORMAL
SPECIMEN SOURCE: ABNORMAL
WET PREP SPEC: ABNORMAL

## 2017-07-17 PROCEDURE — 99213 OFFICE O/P EST LOW 20 MIN: CPT | Performed by: FAMILY MEDICINE

## 2017-07-17 PROCEDURE — 87210 SMEAR WET MOUNT SALINE/INK: CPT | Performed by: FAMILY MEDICINE

## 2017-07-17 RX ORDER — FLUCONAZOLE 150 MG/1
150 TABLET ORAL ONCE
Qty: 1 TABLET | Refills: 0 | Status: SHIPPED | OUTPATIENT
Start: 2017-07-17 | End: 2017-07-17

## 2017-07-17 ASSESSMENT — PAIN SCALES - GENERAL: PAINLEVEL: MODERATE PAIN (4)

## 2017-07-17 NOTE — PATIENT INSTRUCTIONS
ASSESSMENT:   (N89.8) Vaginal discharge  (primary encounter diagnosis)  Comment: is from a yeast infection.  Plan: Wet prep, fluconazole (DIFLUCAN) 150 MG tablet        Take the diflucan one pill which should clear up the discharge.      (R10.31) Abdominal pain, right lower quadrant  Comment: may be muscular-ligamentous.  The pelvic ultrasound and CT scans did not show any problems.  Testing for STDs were negative.  The abnormal PAP is not causing symptoms.    Plan:   OK for the ibuprofen or Aleve as needed.  Hopefully this should improve over the next week or two.     (R87.610,  R87.810) ASCUS with positive high risk HPV cervical  Comment:   Plan: A colposcopy is recommended to look at cervix due to abnormal cells.   You are seeing OB/GYN about the heavy periods.  They can also do colposcopy in addition to the consultation.

## 2017-07-17 NOTE — MR AVS SNAPSHOT
After Visit Summary   7/17/2017    Lauren Macdonald    MRN: [id number]           Patient Information     Date Of Birth          1975        Visit Information        Provider Department      7/17/2017 11:00 AM Duke Abad MD [facility]        Today's Diagnoses     Vaginal discharge    -  1    Abdominal pain, right lower quadrant        ASCUS with positive high risk HPV cervical          Care Instructions    ASSESSMENT:   (N89.8) Vaginal discharge  (primary encounter diagnosis)  Comment: is from a yeast infection.  Plan: Wet prep, fluconazole (DIFLUCAN) 150 MG tablet        Take the diflucan one pill which should clear up the discharge.      (R10.31) Abdominal pain, right lower quadrant  Comment: may be muscular-ligamentous.  The pelvic ultrasound and CT scans did not show any problems.  Testing for STDs were negative.  The abnormal PAP is not causing symptoms.    Plan:   OK for the ibuprofen or Aleve as needed.  Hopefully this should improve over the next week or two.     (R87.610,  R87.810) ASCUS with positive high risk HPV cervical  Comment:   Plan: A colposcopy is recommended to look at cervix due to abnormal cells.   You are seeing OB/GYN about the heavy periods.  They can also do colposcopy in addition to the consultation.           Follow-ups after your visit        Your next 10 appointments already scheduled     Jul 25, 2017 10:00 AM CDT   Office Visit with Sadie Mendes MD   [facility]    [address] [address] [phone]           Bring a current list of meds and any records pertaining to this visit.  For Physicals, please bring immunization records and any forms needing to be filled out.  Please arrive 10 minutes early to complete paperwork.              Who to contact     If you have questions or need follow up information about today's clinic visit or your schedule please contact  "[facility] directly at [phone].  Normal or non-critical lab and imaging results will be communicated to you by MyChart, letter or phone within 4 business days after the clinic has received the results. If you do not hear from us within 7 days, please contact the clinic through MyChart or phone. If you have a critical or abnormal lab result, we will notify you by phone as soon as possible.  Submit refill requests through [employer] or call your pharmacy and they will forward the refill request to us. Please allow 3 business days for your refill to be completed.          Additional Information About Your Visit        [employer] Information     [employer] lets you send messages to your doctor, view your test results, renew your prescriptions, schedule appointments and more. To sign up, go to [email or web address] . Click on \"Log in\" on the left side of the screen, which will take you to the Welcome page. Then click on \"Sign up Now\" on the right side of the page.     You will be asked to enter the access code listed below, as well as some personal information. Please follow the directions to create your username and password.     Your access code is: [email or web address]  Expires: 10/15/2017 12:12 PM     Your access code will  in 90 days. If you need help or a new code, please call your [place] clinic or [phone].        Care EveryWhere ID     This is your Care EveryWhere ID. This could be used by other organizations to access your [place] medical records  [id number]        Your Vitals Were     Pulse Temperature Respirations Last Period Breastfeeding? BMI (Body Mass Index)    76 97.5  F (36.4  C) (Tympanic) 18 2017 (Exact Date) No 21.77 kg/m2       Blood Pressure from Last 3 Encounters:   17 120/60   07/10/17 (!) 129/92   17 129/84    Weight from Last 3 Encounters:   17 126 lb 12.8 oz (57.5 kg)   07/10/17 127 lb (57.6 kg)   17 127 lb (57.6 kg)              We " Performed the Following     Wet prep          Today's Medication Changes          These changes are accurate as of: 7/17/17 12:12 PM.  If you have any questions, ask your nurse or doctor.               Start taking these medicines.        Dose/Directions    fluconazole 150 MG tablet   Commonly known as:  DIFLUCAN   Used for:  Vaginal discharge   Started by:  Duke Abad MD        Dose:  150 mg   Take 1 tablet (150 mg) by mouth once for 1 dose   Quantity:  1 tablet   Refills:  0         Stop taking these medicines if you haven't already. Please contact your care team if you have questions.     metroNIDAZOLE 500 MG tablet   Commonly known as:  FLAGYL   Stopped by:  Duke Abad MD                Where to get your medicines      These medications were sent to [id number] [address]  [address] [place] [address]    Hours:  Closed 10-16-08 business to [facility] Phone:  [phone]     fluconazole 150 MG tablet                Primary Care Provider Office Phone # Fax #    Duke Abad [phone]       [facility] [address] [facility] [address]        Equal Access to Services     BRIANNA [facility] AH: Hadii deep williamson hadasho Soalfredo, waaxda luqadaha, qaybta kaalmada kayli, marianne falcon. So [facility] [phone].    ATENCIÓN: Si habla español, tiene a earl disposición servicios gratuitos de asistencia lingüística. [facility] [phone].    We comply with applicable federal civil rights laws and [place] laws. We do not discriminate on the basis of race, color, national origin, age, disability sex, sexual orientation or gender identity.            Thank you!     Thank you for choosing [facility]  for your care. Our goal is always to provide you with excellent care. Hearing back from our patients is one way we can continue to improve our services. Please take a few minutes to  complete the written survey that you may receive in the mail after your visit with us. Thank you!             Your Updated Medication List - Protect others around you: Learn how to safely use, store and throw away your medicines at www.disposemymeds.org.          This list is accurate as of: 7/17/17 12:12 PM.  Always use your most recent med list.                   Brand Name Dispense Instructions for use Diagnosis    fluconazole 150 MG tablet    DIFLUCAN    1 tablet    Take 1 tablet (150 mg) by mouth once for 1 dose    Vaginal discharge       ondansetron 4 MG ODT tab    ZOFRAN-ODT    10 tablet    Take 1 tablet (4 mg) by mouth every 6 hours as needed for nausea        PROBIOTIC ADVANCED PO      Take 1 capsule by mouth daily        TYLENOL PO      Take 1,000 mg by mouth every 8 hours as needed for mild pain or fever Reported on 3/31/2017

## 2017-07-17 NOTE — PROGRESS NOTES
SUBJECTIVE:                                                    Lauren Macdonald is a 42 year old female who presents to clinic today for the following health issues:      ED/UC Followup:    Facility:  [facility]  Date of visit: 7/9/2017 and 7/10/2017  Reason for visit: presents to emergency department complaining of continued abdominal pain and vaginal discharge.  Patient has had symptoms for the last for 5 days.  She was seen in walk-in clinic at [place] and diagnosed with a bacterial vaginosis.   Current Status: still having some low back pain and pelvic cramping.        Last clinic visit: 6/30/2017 with me for menorrhagia.   7/7 seen in UC for bacterial vaginosis.  Treated with metronidazole oral  7/8 seen in ED for nausea and cramps.  Switched to topical metronidazole.  7/10 seen in ED for abdominal pain.   CT abdomen and pelvis 7/10:  IMPRESSION:   1. No acute abnormality in the abdomen or pelvis. No cause for right  lower quadrant pain is identified. No evidence for appendicitis.   2. A small amount of free fluid in the pelvis is nonspecific and may  be physiologic.  3. Mildly prominent venous structures in the left periuterine region  are of uncertain clinical significance but have increased in  prominence since the previous exam.    Pelvic ultrasound 7/10 which was normal.     Today has white clumpy vaginal discharge.  Vaginal pain.  Not itchy.  Has not noted odor.  Finished topical vaginal metoprolol.     Some cramps loser abdomen and lower back.  Cramps daily abdomen and lower back.    They seem fairly constant.  A little more right side.   Onset of symptoms: started after last menses.    Usually has cramps with menses. Current cramps are less than that she has with menses.   Aggravating factors: bending or lifting.  Not related to meals, eating bowel movements.    Alleviating factors: lying down.     Feels like bladder is full.  No dysuria.  Some frequency.  Seems like she does not empty all  the way.   Has bowel movement daily, regular and normal.  No melena or blood.     Patient Active Problem List   Diagnosis     CARDIOVASCULAR SCREENING; LDL GOAL LESS THAN 160     Esophageal reflux     Anxiety     ASCUS with positive high risk HPV cervical      OBJECTIVE:Blood pressure 120/60, pulse 76, temperature 97.5  F (36.4  C), temperature source Tympanic, resp. rate 18, weight 126 lb 12.8 oz (57.5 kg), last menstrual period 06/26/2017, not currently breastfeeding. BMI=Body mass index is 21.77 kg/(m^2).  GENERAL APPEARANCE ADULT: Alert, no acute distress  ABDOMEN: soft, no organomegaly or masses , mildly tender across lower abdomen.  No CVA tenderness.    (female): external genitalia normal, vaginal discharge white and thick, cervix normal.  Mildly tender bimanual exam, not localized and no specific cervical motion tenderness.   Wet prep:yeast.     ASSESSMENT:   (N89.8) Vaginal discharge  (primary encounter diagnosis)  Comment: is from a yeast infection.  Plan: Wet prep, fluconazole (DIFLUCAN) 150 MG tablet        Take the diflucan one pill which should clear up the discharge.      (R10.31) Abdominal pain, right lower quadrant  Comment: may be muscular-ligamentous.  The pelvic ultrasound and CT scans did not show any problems.  Testing for STDs were negative.  The abnormal PAP is not causing symptoms.    Plan:   OK for the ibuprofen or Aleve as needed.  Hopefully this should improve over the next week or two.     (R87.610,  R87.810) ASCUS with positive high risk HPV cervical  Comment:   Plan: A colposcopy is recommended to look at cervix due to abnormal cells.   You are seeing OB/GYN about the heavy periods.  They can also do colposcopy in addition to the consultation.

## 2017-07-17 NOTE — NURSING NOTE
"Chief Complaint   Patient presents with     ER F/U     7/10- Bacterial vaginosis.       Initial /60 (BP Location: Right arm, Patient Position: Chair, Cuff Size: Adult Regular)  Pulse 76  Temp 97.5  F (36.4  C) (Tympanic)  Resp 18  Wt 126 lb 12.8 oz (57.5 kg)  LMP 06/26/2017 (Exact Date)  Breastfeeding? No  BMI 21.77 kg/m2 Estimated body mass index is 21.77 kg/(m^2) as calculated from the following:    Height as of 7/10/17: 5' 4\" (1.626 m).    Weight as of this encounter: 126 lb 12.8 oz (57.5 kg).  Medication Reconciliation: complete    Health Maintenance that is potentially due pending provider review:  Results of pap smear given to patient- ASCUS- she will schedule a colposcopy.    Pt will schedule colposcopy appt.    "

## 2017-07-18 ENCOUNTER — TELEPHONE (OUTPATIENT)
Dept: FAMILY MEDICINE | Facility: CLINIC | Age: 42
End: 2017-07-18

## 2017-07-20 NOTE — TELEPHONE ENCOUNTER
Lauren came in to sign the form.  I faxed it and sent it to be scanned.  Shellie [facility]

## 2017-07-20 NOTE — TELEPHONE ENCOUNTER
Left message for Lauren to call back to see if she wants to come pick it up or for us to mail it to her. The form needs her signature.

## 2017-07-25 ENCOUNTER — OFFICE VISIT (OUTPATIENT)
Dept: OBGYN | Facility: CLINIC | Age: 42
End: 2017-07-25
Payer: COMMERCIAL

## 2017-07-25 VITALS
HEART RATE: 76 BPM | WEIGHT: 128 LBS | TEMPERATURE: 97 F | HEIGHT: 64 IN | BODY MASS INDEX: 21.85 KG/M2 | SYSTOLIC BLOOD PRESSURE: 115 MMHG | DIASTOLIC BLOOD PRESSURE: 84 MMHG

## 2017-07-25 DIAGNOSIS — N92.0 EXCESSIVE OR FREQUENT MENSTRUATION: Primary | ICD-10-CM

## 2017-07-25 PROCEDURE — 58100 BIOPSY OF UTERUS LINING: CPT | Performed by: OBSTETRICS & GYNECOLOGY

## 2017-07-25 PROCEDURE — 88305 TISSUE EXAM BY PATHOLOGIST: CPT | Performed by: OBSTETRICS & GYNECOLOGY

## 2017-07-25 PROCEDURE — 99243 OFF/OP CNSLTJ NEW/EST LOW 30: CPT | Mod: 25 | Performed by: OBSTETRICS & GYNECOLOGY

## 2017-07-25 NOTE — LETTER
SURGERYPLANNING/SCHEDULING WORKSHEET                                  Lauren Macdonald                :  1975  MRN:  [id number]  Phone: [phone] (home)    Same Day Surgery [phone]   Surgeon: Sadie Mendes MD  Diagnosis:   menorrhagia  Allergies:  Omnicef [cefdinir]   A preoperative evaluation by the primary care provider has been requested to summarize and modify, where possible, medical risks to the proposed surgery.  ====================================================  Surgical Procedure:  OB-GYN Total Vaginal Hysterectomy, cystoscopy  Length of Procedure:  2 hours  Type of anesthesia:  General  The proposed surgical procedure is considered INTERMEDIATE risk.  Date of Procedure:__17__    Time: _9:00 AM____________________       Informed Consent Obtained and Signed:  NO  ====================================================  Instructions to Same Day Surgery Staff  Pneumoboots  Special Equipment: Ligassure  Preop Antibiotic:  Clindamycin 600 mg IV  plus Gentamycin 1.5mg/kg IV (if penicillin allergic), pre-op within 1 hr prior to incision Infuse over 20 mins.  Preop Pain Meds:  Oxycontin 10mg po X one dose, tylenol 975 mg po x 1, pyridium x 1  PreOp Orders:  Routine Standing Orders.  Lab and Xray per patient Health History : Urine HcG, CBC, Type & Screen  ====================================================  Instructions to the patient:  Preop physical exam scheduled (within 30 days or 7 days prior) with:   ____________________  Clinic:  ____________________                                         Date______________Time_________________________  Come to the hospital at: __8:00 AM______________________________  HOME PREPARATION:   Shower with Hibiclens the night before and the morning of surgery, gently cleaning skin from neck to feet  Bathe and brush teeth the morning of surgery.  Take medications with a sip of water the morning of surgery:   May have  a light meal, toast and clear  liquids, up to 6 hrs before surgery  May have clear liquids (liquids one can read through) up to 2 hrs before surgery  NOTHING after 2 hrs before surgery  Sadie Mendes MD    7/25/2017

## 2017-07-25 NOTE — NURSING NOTE
"Chief Complaint   Patient presents with     Consult     irregular and heavy cycles       Initial /84 (BP Location: Left arm, Patient Position: Chair, Cuff Size: Adult Regular)  Pulse 76  Temp 97  F (36.1  C) (Oral)  Ht 5' 4\" (1.626 m)  Wt 128 lb (58.1 kg)  LMP 07/23/2017  Breastfeeding? No  BMI 21.97 kg/m2 Estimated body mass index is 21.97 kg/(m^2) as calculated from the following:    Height as of this encounter: 5' 4\" (1.626 m).    Weight as of this encounter: 128 lb (58.1 kg).  Medication Reconciliation: complete   Cindy Friedman CMA      "

## 2017-07-25 NOTE — PROGRESS NOTES
"Lauren is a 42 year old  here for consultation at the request of Duke Abad for increasingly heavier and frequent menses.  Cramps getting worse as well.  Generally monthly, but sometimes early.  Lasting 7 days (previously 4-5 days).  Passing quarter sized clots.  Has to change both pad + tampon every 1-2 hours (super plus).      CT Scan:  1. No acute abnormality in the abdomen or pelvis. No cause for right  lower quadrant pain is identified. No evidence for appendicitis.   2. A small amount of free fluid in the pelvis is nonspecific and may  be physiologic.  3. Mildly prominent venous structures in the left periuterine region  are of uncertain clinical significance but have increased in  prominence since the previous exam.  U/S:   FINDINGS: The uterus is measured at 10.1 x 5.1 x 6.1 cm. No fibroids  are evident. Endometrial stripe measures 1.3 cm and is within normal  limits for a premenopausal patient. The right ovary is unremarkable.  The left ovary is unremarkable. No solid adnexal masses are  identified. No free pelvic fluid is present. Spectral Doppler and  waveform analysis demonstrate arterial and venous flow to both  ovaries.         IMPRESSION: Unremarkable pelvic ultrasound. No convincing sonographic  evidence for ovarian torsion.    In the past, she had tried oral contraceptive pills but she had either persistent spotting or nausea.  Tubes are tied (no interest in childbearing).  Would like to avoid hormonal contraception due to the side effects.  Is interested in surgical options.      ROS: Ten point review of systems was reviewed and negative except the above.    Gyne: - abn pap (last pap ), - STD's    Past Medical History:   Diagnosis Date     Mononucleosis      Postpartum hemorrhage      Reaction, situational     \"depression\" during divorce     Past Surgical History:   Procedure Laterality Date     D & C      postpartum hemorrhage     ESOPHAGOSCOPY, GASTROSCOPY, DUODENOSCOPY (EGD), " "COMBINED N/A 10/6/2014    Procedure: COMBINED ESOPHAGOSCOPY, GASTROSCOPY, DUODENOSCOPY (EGD), BIOPSY SINGLE OR MULTIPLE;  Surgeon: Sina Pimentel MD;  Location: [facility]     HC BREATH HYDROGEN TEST N/A 11/20/2015    Procedure: HYDROGEN BREATH TEST;  Surgeon: Jordan Davalos MD;  [facility]     TUBAL LIGATION  05/19/03    Postpartum tubal fimbriectomy     Patient Active Problem List   Diagnosis     CARDIOVASCULAR SCREENING; LDL GOAL LESS THAN 160     Esophageal reflux     Anxiety     ASCUS with positive high risk HPV cervical       ALL/Meds: Her medication and allergy histories were reviewed and are documented in their appropriate chart areas.    Social History   Substance Use Topics     Smoking status: Never Smoker     Smokeless tobacco: Never Used     Alcohol use No      Comment: rare       FH: Her family history was reviewed and documented in its appropriate chart area.    PE: /84 (BP Location: Left arm, Patient Position: Chair, Cuff Size: Adult Regular)  Pulse 76  Temp 97  F (36.1  C) (Oral)  Ht 5' 4\" (1.626 m)  Wt 128 lb (58.1 kg)  LMP 07/23/2017  Breastfeeding? No  BMI 21.97 kg/m2  Body mass index is 21.97 kg/(m^2).    General Appearance:  healthy, alert, active, no distress  HEENT: NCAT  Abdomen: Soft, nontender.  Normal bowel sounds.  No masses  Pelvic:       - Ext: Normal external genitalia       - Urethra: no lesions, no masses, no hypermobility       - Urethral Meatus: normal appearance       - Bladder: no tenderness, no masses       - Vagina: pink, moist, normal rugae, no lesions, menstrual discharge       - Cervix: no lesions, multiparous       - Uterus: normal sized, mobile, normal contour       - Adnexa: no masses, no tenderness    Endometrial biopsy was performed without complication.  Cervix was prepped with betadine. No tenaculum needed.  Pipelle sounded to 8 cm.  A representative sample was aspirated from the cavity and sent to pathology.        A/P     ICD-10-CM    1. " Excessive or frequent menstruation N92.0 Surgical pathology exam     ENDOMETRIAL BIOPSY W/O CERVICAL DILATION       1. Reviewed risks and benefits of medical versus surgical therapy.  Medical therapy reviewed included hormonal manipulation with OCP's, Patch, Ring, Depo, or IUD.   Reviewed endometrial ablation versus hysterectomy.  Discussed that endometrial ablation is minimally invasive compared to hysterectomy but may not be definitive.     Patient has already failed other hormonal contraception due to side effects.  Patient not interested in Mirena due to foreign body.  Patient would like to proceed with definitive surgical therapy.     Sadie Mendes M.D.    30 minutes was spent face to face with the patient today discussing her history, diagnosis, and follow-up plan as noted above.  Over 50% of the visit was spent in counseling and coordination of care.    Total Visit Time: 40 minutes.

## 2017-07-25 NOTE — MR AVS SNAPSHOT
"              After Visit Summary   7/25/2017    Lauren Macdonald    MRN: [id number]           Patient Information     Date Of Birth          1975        Visit Information        Provider Department      7/25/2017 3:30 PM Sadie Mendes MD [facility]        Today's Diagnoses     Excessive or frequent menstruation    -  1       Follow-ups after your visit        Your next 10 appointments already scheduled     Aug 15, 2017  2:45 PM CDT   Colposcopy with Sadie Mendes MD, [facility] 1   [facility] ([facility])    [address] [address] [facility] [address]   [phone]              Who to contact     If you have questions or need follow up information about today's clinic visit or your schedule please contact [facility] directly at [phone].  Normal or non-critical lab and imaging results will be communicated to you by MyChart, letter or phone within 4 business days after the clinic has received the results. If you do not hear from us within 7 days, please contact the clinic through MyChart or phone. If you have a critical or abnormal lab result, we will notify you by phone as soon as possible.  Submit refill requests through [employer] or call your pharmacy and they will forward the refill request to us. Please allow 3 business days for your refill to be completed.          Additional Information About Your Visit        MyChart Information     [employer] lets you send messages to your doctor, view your test results, renew your prescriptions, schedule appointments and more. To sign up, go to [email or web address] . Click on \"Log in\" on the left side of the screen, which will take you to the Welcome page. Then click on \"Sign up Now\" on the right side of the page.     You will be asked to enter the access code listed below, as well as some personal information. Please follow the directions to create your username and password.     Your access " "code is: [email or web address]  Expires: 10/15/2017 12:12 PM     Your access code will  in 90 days. If you need help or a new code, please call your [place] clinic or [phone].        Care EveryWhere ID     This is your Care EveryWhere ID. This could be used by other organizations to access your [place] medical records  [id number]        Your Vitals Were     Pulse Temperature Height Last Period Breastfeeding? BMI (Body Mass Index)    76 97  F (36.1  C) (Oral) 5' 4\" (1.626 m) 2017 No 21.97 kg/m2       Blood Pressure from Last 3 Encounters:   17 115/84   17 120/60   07/10/17 (!) 129/92    Weight from Last 3 Encounters:   17 128 lb (58.1 kg)   17 126 lb 12.8 oz (57.5 kg)   07/10/17 127 lb (57.6 kg)              We Performed the Following     ENDOMETRIAL BIOPSY W/O CERVICAL DILATION     Surgical pathology exam          Today's Medication Changes          These changes are accurate as of: 17  5:45 PM.  If you have any questions, ask your nurse or doctor.               Stop taking these medicines if you haven't already. Please contact your care team if you have questions.     ondansetron 4 MG ODT tab   Commonly known as:  ZOFRAN-ODT   Stopped by:  Sadie Mendes MD           TYLENOL PO   Stopped by:  Sadie Mendes MD                    Primary Care Provider Office Phone # Fax #    Duke Reagan Abad [phone]       [place] [id number] [place], [place] [address]        Equal Access to Services     [facility] Center[facility]DRA : Hadlandon Swan, damon benjamin, marianne douglas. So [facility] [phone].    ATENCIÓN: Si habla español, tiene a earl disposición servicios gratuitos de asistencia lingüística. Llame al [phone].    We comply with applicable federal civil rights laws and [place] laws. We do not discriminate on the basis of race, color, national origin, age, disability sex, sexual " orientation or gender identity.            Thank you!     Thank you for choosing [facility]  for your care. Our goal is always to provide you with excellent care. Hearing back from our patients is one way we can continue to improve our services. Please take a few minutes to complete the written survey that you may receive in the mail after your visit with us. Thank you!             Your Updated Medication List - Protect others around you: Learn how to safely use, store and throw away your medicines at www.disposemymeds.org.          This list is accurate as of: 7/25/17  5:45 PM.  Always use your most recent med list.                   Brand Name Dispense Instructions for use Diagnosis    IBUPROFEN PO           PROBIOTIC ADVANCED PO      Take 1 capsule by mouth daily

## 2017-07-27 LAB — COPATH REPORT: NORMAL

## 2017-07-27 NOTE — PROGRESS NOTES
Will forward to [facility] pool for pt notification of normal result.    Stefania Jones   Ob[facility]Gyn Clinic  RN

## 2017-08-01 ENCOUNTER — TELEPHONE (OUTPATIENT)
Dept: OBGYN | Facility: CLINIC | Age: 42
End: 2017-08-01

## 2017-08-01 NOTE — TELEPHONE ENCOUNTER
Reason for Call:  Form, our goal is to have forms completed with 72 hours, however, some forms may require a visit or additional information.    Type of letter, form or note:  employer    Who is the form from?: Patient    Where did the form come from: Patient or family brought in       What clinic location was the form placed at?: [place] [facility]    Where the form was placed: Given to physician    What number is listed as a contact on the form?: [phone]       Additional comments: na      Call taken on 8/1/2017 at 3:52 PM by Micki Ball

## 2017-08-02 NOTE — TELEPHONE ENCOUNTER
Forms were signed and faxed then sent to be scanned and copy at .    Micki Ball  Clinic Station

## 2017-08-06 ENCOUNTER — OFFICE VISIT (OUTPATIENT)
Dept: URGENT CARE | Facility: URGENT CARE | Age: 42
End: 2017-08-06
Payer: COMMERCIAL

## 2017-08-06 VITALS
DIASTOLIC BLOOD PRESSURE: 81 MMHG | HEART RATE: 88 BPM | OXYGEN SATURATION: 98 % | RESPIRATION RATE: 20 BRPM | TEMPERATURE: 98.4 F | SYSTOLIC BLOOD PRESSURE: 116 MMHG

## 2017-08-06 DIAGNOSIS — R52 BODY ACHES: ICD-10-CM

## 2017-08-06 DIAGNOSIS — R30.0 DYSURIA: Primary | ICD-10-CM

## 2017-08-06 LAB
ALBUMIN SERPL-MCNC: 4.4 G/DL (ref 3.4–5)
ALBUMIN UR-MCNC: 30 MG/DL
ALP SERPL-CCNC: 47 U/L (ref 40–150)
ALT SERPL W P-5'-P-CCNC: 16 U/L (ref 0–50)
ANION GAP SERPL CALCULATED.3IONS-SCNC: 8 MMOL/L (ref 3–14)
APPEARANCE UR: CLEAR
AST SERPL W P-5'-P-CCNC: 11 U/L (ref 0–45)
BACTERIA #/AREA URNS HPF: ABNORMAL /HPF
BILIRUB SERPL-MCNC: 1.3 MG/DL (ref 0.2–1.3)
BILIRUB UR QL STRIP: ABNORMAL
BUN SERPL-MCNC: 10 MG/DL (ref 7–30)
CALCIUM SERPL-MCNC: 9.1 MG/DL (ref 8.5–10.1)
CHLORIDE SERPL-SCNC: 105 MMOL/L (ref 94–109)
CO2 SERPL-SCNC: 25 MMOL/L (ref 20–32)
COLOR UR AUTO: YELLOW
CREAT SERPL-MCNC: 0.85 MG/DL (ref 0.52–1.04)
ERYTHROCYTE [DISTWIDTH] IN BLOOD BY AUTOMATED COUNT: 11.8 % (ref 10–15)
GFR SERPL CREATININE-BSD FRML MDRD: 73 ML/MIN/1.7M2
GLUCOSE SERPL-MCNC: 83 MG/DL (ref 70–99)
GLUCOSE UR STRIP-MCNC: NEGATIVE MG/DL
HCT VFR BLD AUTO: 37.1 % (ref 35–47)
HGB BLD-MCNC: 12.9 G/DL (ref 11.7–15.7)
HGB UR QL STRIP: NEGATIVE
KETONES UR STRIP-MCNC: 15 MG/DL
LEUKOCYTE ESTERASE UR QL STRIP: NEGATIVE
MCH RBC QN AUTO: 32.1 PG (ref 26.5–33)
MCHC RBC AUTO-ENTMCNC: 34.8 G/DL (ref 31.5–36.5)
MCV RBC AUTO: 92 FL (ref 78–100)
NITRATE UR QL: NEGATIVE
NON-SQ EPI CELLS #/AREA URNS LPF: ABNORMAL /LPF
PH UR STRIP: 5.5 PH (ref 5–7)
PLATELET # BLD AUTO: 269 10E9/L (ref 150–450)
POTASSIUM SERPL-SCNC: 4 MMOL/L (ref 3.4–5.3)
PROT SERPL-MCNC: 7.5 G/DL (ref 6.8–8.8)
RBC # BLD AUTO: 4.02 10E12/L (ref 3.8–5.2)
RBC #/AREA URNS AUTO: ABNORMAL /HPF (ref 0–2)
SODIUM SERPL-SCNC: 138 MMOL/L (ref 133–144)
SP GR UR STRIP: >1.03 (ref 1–1.03)
URN SPEC COLLECT METH UR: ABNORMAL
UROBILINOGEN UR STRIP-ACNC: 1 EU/DL (ref 0.2–1)
WBC # BLD AUTO: 6.2 10E9/L (ref 4–11)
WBC #/AREA URNS AUTO: ABNORMAL /HPF (ref 0–2)

## 2017-08-06 PROCEDURE — 85027 COMPLETE CBC AUTOMATED: CPT | Performed by: NURSE PRACTITIONER

## 2017-08-06 PROCEDURE — 80053 COMPREHEN METABOLIC PANEL: CPT | Performed by: NURSE PRACTITIONER

## 2017-08-06 PROCEDURE — 81001 URINALYSIS AUTO W/SCOPE: CPT | Performed by: NURSE PRACTITIONER

## 2017-08-06 PROCEDURE — 36415 COLL VENOUS BLD VENIPUNCTURE: CPT | Performed by: NURSE PRACTITIONER

## 2017-08-06 PROCEDURE — 99213 OFFICE O/P EST LOW 20 MIN: CPT | Performed by: NURSE PRACTITIONER

## 2017-08-06 NOTE — MR AVS SNAPSHOT
After Visit Summary   8/6/2017    Lauren Macdonald    MRN: [id number]           Patient Information     Date Of Birth          1975        Visit Information        Provider Department      8/6/2017 11:40 AM Lucille Velasquez APRN CNP [facility]        Today's Diagnoses     Dysuria    -  1    Body aches           Follow-ups after your visit        Your next 10 appointments already scheduled     Aug 15, 2017  2:45 PM CDT   Colposcopy with Sadie Mendes MD, [facility] 1   [facility] ([facility]    [address] [facility] [address]   [phone]            Aug 31, 2017  2:40 PM CDT   Pre-Op physical with Duke Abad MD   [facility] ([facility])    [address] [place] [address] [phone]            Sep 06, 2017   Procedure with Sadie Mendes MD   [facility] (--)    [address] [facility] [address]   [phone]           The medical center is located at [address]. (between [address] in [place], [place] miles north of [place]).              Who to contact     If you have questions or need follow up information about today's clinic visit or your schedule please contact [facility] directly at [phone].  Normal or non-critical lab and imaging results will be communicated to you by MyChart, letter or phone within 4 business days after the clinic has received the results. If you do not hear from us within 7 days, please contact the clinic through MyChart or phone. If you have a critical or abnormal lab result, we will notify you by phone as soon as possible.  Submit refill requests through [employer] or call your pharmacy and they will forward the refill request to us. Please allow 3 business days for your refill to be completed.          Additional Information About  "Your Visit        MyChart Information     [employer] lets you send messages to your doctor, view your test results, renew your prescriptions, schedule appointments and more. To sign up, go to [email or web address] . Click on \"Log in\" on the left side of the screen, which will take you to the Welcome page. Then click on \"Sign up Now\" on the right side of the page.     You will be asked to enter the access code listed below, as well as some personal information. Please follow the directions to create your username and password.     Your access code is: [email or web address]  Expires: 10/15/2017 12:12 PM     Your access code will  in 90 days. If you need help or a new code, please call your [place] clinic or [phone].        Care EveryWhere ID     This is your [facility] EveryWhere ID. This could be used by other organizations to access your [place] medical records  [id number]        Your Vitals Were     Pulse Temperature Respirations Last Period Pulse Oximetry       88 98.4  F (36.9  C) (Tympanic) 20 2017 98%        Blood Pressure from Last 3 Encounters:   17 116/81   17 115/84   17 120/60    Weight from Last 3 Encounters:   17 128 lb (58.1 kg)   17 126 lb 12.8 oz (57.5 kg)   07/10/17 127 lb (57.6 kg)              We Performed the Following     *UA reflex to Microscopic and Culture ([place] and [facility] (except [place] and [place])     CBC with platelets     Comprehensive metabolic panel (BMP + Alb, Alk Phos, ALT, AST, Total. Bili, TP)     Urine Microscopic        Primary Care Provider Office Phone # Fax #    Duke Abad [phone]       [facility] [address] [address]        Equal Access to Services     MADDIE CHANEY : Oj Swan, waeulalio luqadaha, qaybta marianne araujo. [facility] [phone].    ATENCIÓN: Si raul melissa, tiene a earl disposición servicios " cris de asistencia lingüística. Jacinta galdamez [phone].    We comply with applicable federal civil rights laws and [place] laws. We do not discriminate on the basis of race, color, national origin, age, disability sex, sexual orientation or gender identity.            Thank you!     Thank you for choosing [facility]  for your care. Our goal is always to provide you with excellent care. Hearing back from our patients is one way we can continue to improve our services. Please take a few minutes to complete the written survey that you may receive in the mail after your visit with us. Thank you!             Your Updated Medication List - Protect others around you: Learn how to safely use, store and throw away your medicines at www.disposemymeds.org.          This list is accurate as of: 8/6/17 12:27 PM.  Always use your most recent med list.                   Brand Name Dispense Instructions for use Diagnosis    IBUPROFEN PO           PROBIOTIC ADVANCED PO      Take 1 capsule by mouth daily

## 2017-08-06 NOTE — PROGRESS NOTES
SUBJECTIVE:                                                    Lauren Macdonald is a 42 year old female who presents to clinic today for the following health issues:    URINARY TRACT SYMPTOMS  Onset: yesterday    Description:   Painful urination (Dysuria): YES  Blood in urine (Hematuria): no   Delay in urine (Hesitency): no, frequency and incomplete voiding    Intensity: mild    Progression of Symptoms:  same    Accompanying Signs & Symptoms:  Fever/chills: no   Flank pain YES  Nausea and vomiting: YES- nausea  Any vaginal symptoms: none  Abdominal/Pelvic Pain: no     History:   History of frequent UTI's: no   History of kidney stones: no   Sexually Active: YES  Possibility of pregnancy: No    Precipitating factors:   none    Therapies Tried and outcome: Cranberry juice prn (contraindicated in Coumadin patients) and Increase fluid intake    Loose stools started last night 5 or 6 today already  No new foods and no one else with symptoms.    Problem list and histories reviewed & adjusted, as indicated.  Additional history: as documented    Patient Active Problem List   Diagnosis     CARDIOVASCULAR SCREENING; LDL GOAL LESS THAN 160     Esophageal reflux     Anxiety     ASCUS with positive high risk HPV cervical     Excessive or frequent menstruation     Past Surgical History:   Procedure Laterality Date     D & C      postpartum hemorrhage     ESOPHAGOSCOPY, GASTROSCOPY, DUODENOSCOPY (EGD), COMBINED N/A 10/6/2014    Procedure: COMBINED ESOPHAGOSCOPY, GASTROSCOPY, DUODENOSCOPY (EGD), BIOPSY SINGLE OR MULTIPLE;  Surgeon: Sina Pimentel MD;  [facility]     HC BREATH HYDROGEN TEST N/A 11/20/2015    Procedure: HYDROGEN BREATH TEST;  Surgeon: Jordan Davalos MD;  Location:  GI     TUBAL LIGATION  05/19/03    Postpartum tubal fimbriectomy       Social History   Substance Use Topics     Smoking status: Never Smoker     Smokeless tobacco: Never Used     Alcohol use No      Comment: rare     Family History    Problem Relation Age of Onset     Type 2 Diabetes Maternal Grandmother      Type 2 Diabetes Maternal Grandfather      and stomach problems     HEART DISEASE Maternal Grandfather      Alzheimer Disease Paternal Grandmother      CANCER Paternal Grandfather      mesothelioma     Hypertension Father      CEREBROVASCULAR DISEASE Father 65     minor     Other - See Comments Father      now: spot on lung and ? prostate     Gallbladder Disease Mother      young age, yoon         Current Outpatient Prescriptions   Medication Sig Dispense Refill     Probiotic Product (PROBIOTIC ADVANCED PO) Take 1 capsule by mouth daily        IBUPROFEN PO        Allergies   Allergen Reactions     Omnicef [Cefdinir] GI Disturbance     Labs reviewed in EPIC      Reviewed and updated as needed this visit by clinical staff     Reviewed and updated as needed this visit by Provider       ROS:  Constitutional, HEENT, cardiovascular, pulmonary, gi and gu systems are negative, except as otherwise noted.      OBJECTIVE:   /81  Pulse 88  Temp 98.4  F (36.9  C) (Tympanic)  Resp 20  LMP 07/23/2017  SpO2 98%  There is no height or weight on file to calculate BMI.  GENERAL: healthy, alert and no distress  NECK: no adenopathy  RESP: lungs clear to auscultation - no rales, rhonchi or wheezes  CV: regular rate and rhythm, normal S1 S2, no S3 or S4, no murmur  ABDOMEN: tenderness suprapubic and bowel sounds normal  BACK: bilateral CVA tenderness  PSYCH: mentation appears normal, affect normal/bright    Diagnostic Test Results:  Results for orders placed or performed in visit on 08/06/17 (from the past 24 hour(s))   *UA reflex to Microscopic and Culture ([place] and [facility] (except [place] and [place])   Result Value Ref Range    Color Urine Yellow     Appearance Urine Clear     Glucose Urine Negative NEG mg/dL    Bilirubin Urine (A) NEG     Small  This is an unconfirmed screening test result. A positive result may be false.       Ketones Urine 15 (A) NEG mg/dL    Specific Gravity Urine >1.030 1.003 - 1.035    Blood Urine Negative NEG    pH Urine 5.5 5.0 - 7.0 pH    Protein Albumin Urine 30 (A) NEG mg/dL    Urobilinogen Urine 1.0 0.2 - 1.0 EU/dL    Nitrite Urine Negative NEG    Leukocyte Esterase Urine Negative NEG    Source Midstream Urine    Urine Microscopic   Result Value Ref Range    WBC Urine O - 2 0 - 2 /HPF    RBC Urine O - 2 0 - 2 /HPF    Squamous Epithelial /LPF Urine Moderate (A) FEW /LPF    Bacteria Urine Few (A) NEG /HPF   CBC with platelets   Result Value Ref Range    WBC 6.2 4.0 - 11.0 10e9/L    RBC Count 4.02 3.8 - 5.2 10e12/L    Hemoglobin 12.9 11.7 - 15.7 g/dL    Hematocrit 37.1 35.0 - 47.0 %    MCV 92 78 - 100 fl    MCH 32.1 26.5 - 33.0 pg    MCHC 34.8 31.5 - 36.5 g/dL    RDW 11.8 10.0 - 15.0 %    Platelet Count 269 150 - 450 10e9/L       ASSESSMENT/PLAN:     1. Dysuria  UA negative for infection.  Symptomatic care and follow up discussed.  - *UA reflex to Microscopic and Culture ([place] and [facility] (except [place] and [place])  - Urine Microscopic    2. Body aches  No distress. CBC and UA unremarkable for infection. CMP pending.  Suspect symptoms related to viral GI infection.  Lauren will monitor symptoms and follow up immediately with any worsening symptoms.  Letter provided for work today.  - CBC with platelets  - Comprehensive metabolic panel (BMP + Alb, Alk Phos, ALT, AST, Total. Bili, TP)    Home care instructions were reviewed with the patient. The risks, benefits and treatment options of prescribed medications or other treatments have been discussed with the patient. The patient verbalized their understanding and should call or follow up if no improvement or if they develop further problems.      CANDICE Briggs [facility]

## 2017-08-06 NOTE — LETTER
Lauren RANGEL Renae  [address] [address] [address] [address]        August 6, 2017           To Whom It May Concern:    Lauren Macdonald was seen in our clinic. Please excuse from work 8/6/2017.    Sincerely,        CANDICE Briggs CNP

## 2017-08-07 ENCOUNTER — TELEPHONE (OUTPATIENT)
Dept: FAMILY MEDICINE | Facility: CLINIC | Age: 42
End: 2017-08-07

## 2017-08-31 ENCOUNTER — OFFICE VISIT (OUTPATIENT)
Dept: FAMILY MEDICINE | Facility: CLINIC | Age: 42
End: 2017-08-31
Payer: COMMERCIAL

## 2017-08-31 ENCOUNTER — HOSPITAL LABORATORY (OUTPATIENT)
Facility: OTHER | Age: 42
End: 2017-08-31

## 2017-08-31 VITALS
DIASTOLIC BLOOD PRESSURE: 64 MMHG | RESPIRATION RATE: 14 BRPM | WEIGHT: 125 LBS | SYSTOLIC BLOOD PRESSURE: 110 MMHG | HEIGHT: 64 IN | BODY MASS INDEX: 21.34 KG/M2 | HEART RATE: 70 BPM

## 2017-08-31 DIAGNOSIS — Z01.818 PREOP GENERAL PHYSICAL EXAM: Primary | ICD-10-CM

## 2017-08-31 DIAGNOSIS — N92.0 EXCESSIVE OR FREQUENT MENSTRUATION: ICD-10-CM

## 2017-08-31 PROCEDURE — 99214 OFFICE O/P EST MOD 30 MIN: CPT | Performed by: FAMILY MEDICINE

## 2017-08-31 NOTE — MR AVS SNAPSHOT
After Visit Summary   8/31/2017    Lauren Macdonald    MRN: [id number]           Patient Information     Date Of Birth          1975        Visit Information        Provider Department      8/31/2017 2:40 PM Duke Abad MD [facility]        Today's Diagnoses     Preop general physical exam    -  1    Excessive or frequent menstruation          Care Instructions      Before Your Surgery      Call your surgeon if there is any change in your health. This includes signs of a cold or flu (such as a sore throat, runny nose, cough, rash or fever).    Do not smoke, drink alcohol or take over the counter medicine (unless your surgeon or primary care doctor tells you to) for the 24 hours before and after surgery.    If you take prescribed drugs: Follow your doctor s orders about which medicines to take and which to stop until after surgery.    Eating and drinking prior to surgery: follow the instructions from your surgeon    Take a shower or bath the night before surgery. Use the soap your surgeon gave you to gently clean your skin. If you do not have soap from your surgeon, use your regular soap. Do not shave or scrub the surgery site.  Wear clean pajamas and have clean sheets on your bed.           Follow-ups after your visit        Your next 10 appointments already scheduled     Sep 06, 2017   Procedure with Sadie Mendes MD   [facility] (--)    [address] [address] [phone]           The medical center is located at [address] (between [address] in [place], [place] north of [place]).              Who to contact     If you have questions or need follow up information about today's clinic visit or your schedule please contact [facility] directly at [phone].  Normal or non-critical lab and imaging results will be communicated to you by MyChart, letter or phone within 4 business  "days after the clinic has received the results. If you do not hear from us within 7 days, please contact the clinic through Boke or phone. If you have a critical or abnormal lab result, we will notify you by phone as soon as possible.  Submit refill requests through Boke or call your pharmacy and they will forward the refill request to us. Please allow 3 business days for your refill to be completed.          Additional Information About Your Visit        Boke Information     Boke lets you send messages to your doctor, view your test results, renew your prescriptions, schedule appointments and more. To sign up, go to [email or web address] . Click on \"Log in\" on the left side of the screen, which will take you to the Welcome page. Then click on \"Sign up Now\" on the right side of the page.     You will be asked to enter the access code listed below, as well as some personal information. Please follow the directions to create your username and password.     Your access code is: [email or web address]  Expires: 10/15/2017 12:12 PM     Your access code will  in 90 days. If you need help or a new code, please call your [facility] clinic or [phone].        Care EveryWhere ID     This is your Care EveryWhere ID. This could be used by other organizations to access your [facility] medical records  [id number]        Your Vitals Were     Pulse Respirations Height Last Period Breastfeeding? BMI (Body Mass Index)    70 14 5' 4\" (1.626 m) 2017 (Approximate) No 21.46 kg/m2       Blood Pressure from Last 3 Encounters:   17 110/64   17 116/81   17 115/84    Weight from Last 3 Encounters:   17 125 lb (56.7 kg)   17 128 lb (58.1 kg)   17 126 lb 12.8 oz (57.5 kg)              Today, you had the following     No orders found for display       Primary Care Provider Office Phone # Fax #    Duke Abad [phone] [address] [place] [id number]      "   Equal Access to Services     [facility] : Hadii aad ku hadarturflako Aartialfredo, watoshada luqadaha, qadyanta sarahhaimarianne holden. So [facility] [phone].    ATENCIÓN: Si habla español, tiene a earl disposición servicios gratuitos de asistencia lingüística. Llame al [phone].    We comply with applicable federal civil rights laws and [place] laws. We do not discriminate on the basis of race, color, national origin, age, disability sex, sexual orientation or gender identity.            Thank you!     Thank you for choosing [facility]  for your care. Our goal is always to provide you with excellent care. Hearing back from our patients is one way we can continue to improve our services. Please take a few minutes to complete the written survey that you may receive in the mail after your visit with us. Thank you!             Your Updated Medication List - Protect others around you: Learn how to safely use, store and throw away your medicines at www.disposemymeds.org.          This list is accurate as of: 8/31/17  9:08 PM.  Always use your most recent med list.                   Brand Name Dispense Instructions for use Diagnosis    IBUPROFEN PO           PROBIOTIC ADVANCED PO      Take 1 capsule by mouth daily

## 2017-08-31 NOTE — PROGRESS NOTES
[facility]  [address] [place] [address]  [phone]  Dept: [phone]    PRE-OP EVALUATION:  Today's date: 2017    Lauren Macdonald (: 1975) presents for pre-operative evaluation assessment as requested by Dr. Mendes.  She requires evaluation and anesthesia risk assessment prior to undergoing surgery/procedure for treatment of excessive menstration .  Proposed procedure: Vaginal Hysterectomy and Cystoscopy    Date of Surgery/ Procedure: 2017  Time of Surgery/ Procedure:   Hospital/Surgical Facility: [facility]  Fax number for surgical facility:   Primary Physician: Duke Abad  Type of Anesthesia Anticipated: General    Patient has a Health Care Directive or Living Will:  NO    1. NO - Do you have a history of heart attack, stroke, stent, bypass or surgery on an artery in the head, neck, heart or legs?  2. NO - Do you ever have any pain or discomfort in your chest?  3. NO - Do you have a history of  Heart Failure?  4. NO - Are you troubled by shortness of breath when: walking on the level, up a slight hill or at night?  5. NO - Do you currently have a cold, bronchitis or other respiratory infection?  6. NO - Do you have a cough, shortness of breath or wheezing?  7. NO - Do you sometimes get pains in the calves of your legs when you walk?  8. NO - Do you or anyone in your family have previous history of blood clots?  9. NO - Do you or does anyone in your family have a serious bleeding problem such as prolonged bleeding following surgeries or cuts?  10. NO - Have you ever had problems with anemia or been told to take iron pills?  11. NO - Have you had any abnormal blood loss such as black, tarry or bloody stools, or abnormal vaginal bleeding?  12. NO - Have you ever had a blood transfusion?  13. NO - Have you or any of your relatives ever had problems with anesthesia?  14. NO - Do you have sleep apnea, excessive snoring or daytime drowsiness?  15. NO - Do  "you have any prosthetic heart valves?  16. NO - Do you have prosthetic joints?  17. NO - Is there any chance that you may be pregnant?        HPI:                                                    She has had abnormal vaginal bleeding in the past 6 months or more.  Menses heavy with cramping and clots.  She has had a previous tubal ligation.  She has been on oral contraceptive in the past.  A pelvic ultrasound was normal in July.  An endometrial biopsy was also OK.    She has consulted OB/GYN about possible treatments and has elected to undergo a vaginal hysterectomy.     MEDICAL HISTORY:                                                    Patient Active Problem List    Diagnosis Date Noted     Excessive or frequent menstruation 07/25/2017     Priority: Medium     ASCUS with positive high risk HPV cervical 06/30/2017     Priority: Medium     6/30/17 ASCUS Pap, + HR HPV (not 16/18). Plan colp       Anxiety 06/01/2015     Priority: Medium     Esophageal reflux 11/19/2013     Priority: Medium     CARDIOVASCULAR SCREENING; LDL GOAL LESS THAN 160 10/31/2010     Priority: Medium      Past Medical History:   Diagnosis Date     Mononucleosis      Postpartum hemorrhage      Reaction, situational     \"depression\" during divorce     Past Surgical History:   Procedure Laterality Date     D & C      postpartum hemorrhage     ESOPHAGOSCOPY, GASTROSCOPY, DUODENOSCOPY (EGD), COMBINED N/A 10/6/2014    Procedure: COMBINED ESOPHAGOSCOPY, GASTROSCOPY, DUODENOSCOPY (EGD), BIOPSY SINGLE OR MULTIPLE;  Surgeon: Sina Pimentel MD;  Location: [facility]     HC BREATH HYDROGEN TEST N/A 11/20/2015    Procedure: HYDROGEN BREATH TEST;  Surgeon: Jordan Davalos MD;  Location:  GI     TUBAL LIGATION  05/19/03    Postpartum tubal fimbriectomy     Current Outpatient Prescriptions   Medication Sig Dispense Refill     Probiotic Product (PROBIOTIC ADVANCED PO) Take 1 capsule by mouth daily        IBUPROFEN PO        OTC products: None, except as " "noted above    Allergies   Allergen Reactions     Omnicef [Cefdinir] GI Disturbance      Latex Allergy: NO    Social History   Substance Use Topics     Smoking status: Never Smoker     Smokeless tobacco: Never Used     Alcohol use No      Comment: rare     History   Drug Use No     Family History :  ============  No family history of bleeding or anesthesia problems.     REVIEW OF SYSTEMS:                                                    ROS:  General: No change in weight, sleep or appetite.  Normal energy.  No fever or chills  Eyes: Negative for vision changes or eye problems  ENT: No problems with ears, nose or throat.  No difficulty swallowing.  Resp: No coughing, wheezing or shortness of breath  CV: No chest pains or palpitations  GI: No nausea, vomiting,  heartburn, abdominal pain, diarrhea, constipation or change in bowel habits  : No urinary frequency or dysuria, bladder or kidney problems  Musculoskeletal: POSITIVE  for:, back pain, sees chiropractor   Neurologic: No headaches, numbness, tingling, weakness, problems with balance or coordination  Psychiatric: No problems with anxiety, depression or mental health  Heme/immune/allergy: No history of bleeding or clotting problems or anemia.  No allergies or immune system problems  Endocrine: No history of thyroid disease, diabetes or other endocrine disorders  Skin: No rashes,worrisome lesions or skin problems     EXAM:                                                    OBJECTIVE:Blood pressure 110/64, pulse 70, resp. rate 14, height 5' 4\" (1.626 m), weight 125 lb (56.7 kg), last menstrual period 08/21/2017, not currently breastfeeding. BMI=Body mass index is 21.46 kg/(m^2).  GENERAL APPEARANCE ADULT: Alert, no acute distress, healthy appearance  EYES: PERRL, EOM normal, conjunctiva and lids normal  HENT: Ears and TMs normal, oral mucosa and posterior oropharynx normal  NECK: No adenopathy,masses or thyromegaly  RESP: lungs clear to auscultation   CV: normal " rate, regular rhythm, no murmur or gallop  ABDOMEN: soft, no organomegaly, masses or tenderness  MS: extremities normal, no peripheral edema     DIAGNOSTICS:                                                    NO EKG needed.  preop labs per her surgeon.     Recent Labs   Lab Test  08/06/17   1155  07/10/17   0700   HGB  12.9  11.6*   PLT  269  264   NA  138  140   POTASSIUM  4.0  3.7   CR  0.85  0.87        IMPRESSION:                                                    Reason for surgery/procedure: menorrhagia    The proposed surgical procedure is considered INTERMEDIATE risk.    REVISED CARDIAC RISK INDEX  The patient has the following serious cardiovascular risks for perioperative complications such as (MI, PE, VFib and 3  AV Block):  No serious cardiac risks  INTERPRETATION: 0 risks: Class I (very low risk - 0.4% complication rate)    The patient has the following additional risks for perioperative complications:  No identified additional risks      ICD-10-CM    1. Preop general physical exam Z01.818    2. Excessive or frequent menstruation N92.0        RECOMMENDATIONS:                                                      APPROVAL GIVEN to proceed with proposed procedure, without further diagnostic evaluation       Signed Electronically by: Duke Abad MD    Copy of this evaluation report is provided to requesting physician.    [facility] Preop Guidelines

## 2017-08-31 NOTE — NURSING NOTE
"Chief Complaint   Patient presents with     Pre-Op Exam       Initial /64  Pulse 70  Resp 14  Ht 5' 4\" (1.626 m)  Wt 125 lb (56.7 kg)  LMP 08/21/2017 (Approximate)  Breastfeeding? No  BMI 21.46 kg/m2 Estimated body mass index is 21.46 kg/(m^2) as calculated from the following:    Height as of this encounter: 5' 4\" (1.626 m).    Weight as of this encounter: 125 lb (56.7 kg).  Medication Reconciliation: complete    Health Maintenance that is potentially due pending provider review:  NONE    n/a    Is there anyone who you would like to be able to receive your results? No  If yes have patient fill out AARTI    "

## 2017-09-01 LAB — HGB BLD-MCNC: 12.4 G/DL (ref 11.7–15.7)

## 2017-09-03 ENCOUNTER — ANESTHESIA EVENT (OUTPATIENT)
Dept: SURGERY | Facility: CLINIC | Age: 42
End: 2017-09-03
Payer: COMMERCIAL

## 2017-09-06 ENCOUNTER — SURGERY (OUTPATIENT)
Age: 42
End: 2017-09-06

## 2017-09-06 ENCOUNTER — HOSPITAL ENCOUNTER (OUTPATIENT)
Facility: CLINIC | Age: 42
Discharge: HOME | End: 2017-09-06
Attending: OBSTETRICS & GYNECOLOGY | Admitting: OBSTETRICS & GYNECOLOGY
Payer: COMMERCIAL

## 2017-09-06 ENCOUNTER — ANESTHESIA (OUTPATIENT)
Dept: SURGERY | Facility: CLINIC | Age: 42
End: 2017-09-06
Payer: COMMERCIAL

## 2017-09-06 VITALS
DIASTOLIC BLOOD PRESSURE: 81 MMHG | WEIGHT: 125 LBS | HEIGHT: 64 IN | TEMPERATURE: 98.2 F | OXYGEN SATURATION: 99 % | RESPIRATION RATE: 14 BRPM | BODY MASS INDEX: 21.34 KG/M2 | SYSTOLIC BLOOD PRESSURE: 140 MMHG

## 2017-09-06 DIAGNOSIS — N92.0 EXCESSIVE OR FREQUENT MENSTRUATION: Primary | ICD-10-CM

## 2017-09-06 LAB
ABO + RH BLD: NORMAL
ABO + RH BLD: NORMAL
BLD GP AB SCN SERPL QL: NORMAL
BLOOD BANK CMNT PATIENT-IMP: NORMAL
HCG UR QL: NEGATIVE
SPECIMEN EXP DATE BLD: NORMAL

## 2017-09-06 PROCEDURE — 37000009 ZZH ANESTHESIA TECHNICAL FEE, EACH ADDTL 15 MIN: Performed by: OBSTETRICS & GYNECOLOGY

## 2017-09-06 PROCEDURE — 25000128 H RX IP 250 OP 636: Performed by: NURSE ANESTHETIST, CERTIFIED REGISTERED

## 2017-09-06 PROCEDURE — 25000125 ZZHC RX 250: Performed by: OBSTETRICS & GYNECOLOGY

## 2017-09-06 PROCEDURE — 36000056 ZZH SURGERY LEVEL 3 1ST 30 MIN: Performed by: OBSTETRICS & GYNECOLOGY

## 2017-09-06 PROCEDURE — S0077 INJECTION, CLINDAMYCIN PHOSP: HCPCS | Performed by: OBSTETRICS & GYNECOLOGY

## 2017-09-06 PROCEDURE — 58260 VAGINAL HYSTERECTOMY: CPT | Mod: 80 | Performed by: OBSTETRICS & GYNECOLOGY

## 2017-09-06 PROCEDURE — 36415 COLL VENOUS BLD VENIPUNCTURE: CPT | Performed by: OBSTETRICS & GYNECOLOGY

## 2017-09-06 PROCEDURE — 71000013 ZZH RECOVERY PHASE 1 LEVEL 1 EA ADDTL HR: Performed by: OBSTETRICS & GYNECOLOGY

## 2017-09-06 PROCEDURE — 25800025 ZZH RX 258: Performed by: OBSTETRICS & GYNECOLOGY

## 2017-09-06 PROCEDURE — 81025 URINE PREGNANCY TEST: CPT | Performed by: OBSTETRICS & GYNECOLOGY

## 2017-09-06 PROCEDURE — 25000125 ZZHC RX 250: Performed by: NURSE ANESTHETIST, CERTIFIED REGISTERED

## 2017-09-06 PROCEDURE — 40000306 ZZH STATISTIC PRE PROC ASSESS II: Performed by: OBSTETRICS & GYNECOLOGY

## 2017-09-06 PROCEDURE — 27110028 ZZH OR GENERAL SUPPLY NON-STERILE: Performed by: OBSTETRICS & GYNECOLOGY

## 2017-09-06 PROCEDURE — 86900 BLOOD TYPING SEROLOGIC ABO: CPT | Performed by: OBSTETRICS & GYNECOLOGY

## 2017-09-06 PROCEDURE — 25000128 H RX IP 250 OP 636: Performed by: OBSTETRICS & GYNECOLOGY

## 2017-09-06 PROCEDURE — 88307 TISSUE EXAM BY PATHOLOGIST: CPT | Mod: 26 | Performed by: OBSTETRICS & GYNECOLOGY

## 2017-09-06 PROCEDURE — 71000027 ZZH RECOVERY PHASE 2 EACH 15 MINS: Performed by: OBSTETRICS & GYNECOLOGY

## 2017-09-06 PROCEDURE — 25000132 ZZH RX MED GY IP 250 OP 250 PS 637: Performed by: OBSTETRICS & GYNECOLOGY

## 2017-09-06 PROCEDURE — 27210794 ZZH OR GENERAL SUPPLY STERILE: Performed by: OBSTETRICS & GYNECOLOGY

## 2017-09-06 PROCEDURE — 86901 BLOOD TYPING SEROLOGIC RH(D): CPT | Performed by: OBSTETRICS & GYNECOLOGY

## 2017-09-06 PROCEDURE — 71000012 ZZH RECOVERY PHASE 1 LEVEL 1 FIRST HR: Performed by: OBSTETRICS & GYNECOLOGY

## 2017-09-06 PROCEDURE — 58260 VAGINAL HYSTERECTOMY: CPT | Performed by: OBSTETRICS & GYNECOLOGY

## 2017-09-06 PROCEDURE — 36000058 ZZH SURGERY LEVEL 3 EA 15 ADDTL MIN: Performed by: OBSTETRICS & GYNECOLOGY

## 2017-09-06 PROCEDURE — 88307 TISSUE EXAM BY PATHOLOGIST: CPT | Performed by: OBSTETRICS & GYNECOLOGY

## 2017-09-06 PROCEDURE — 37000008 ZZH ANESTHESIA TECHNICAL FEE, 1ST 30 MIN: Performed by: OBSTETRICS & GYNECOLOGY

## 2017-09-06 PROCEDURE — 86850 RBC ANTIBODY SCREEN: CPT | Performed by: OBSTETRICS & GYNECOLOGY

## 2017-09-06 RX ORDER — HYDROXYZINE HYDROCHLORIDE 25 MG/1
25 TABLET, FILM COATED ORAL
Status: COMPLETED | OUTPATIENT
Start: 2017-09-06 | End: 2017-09-06

## 2017-09-06 RX ORDER — OXYCODONE HCL 10 MG/1
10 TABLET, FILM COATED, EXTENDED RELEASE ORAL
Status: COMPLETED | OUTPATIENT
Start: 2017-09-06 | End: 2017-09-06

## 2017-09-06 RX ORDER — PROPOFOL 10 MG/ML
INJECTION, EMULSION INTRAVENOUS CONTINUOUS PRN
Status: DISCONTINUED | OUTPATIENT
Start: 2017-09-06 | End: 2017-09-06

## 2017-09-06 RX ORDER — MEPERIDINE HYDROCHLORIDE 25 MG/ML
12.5 INJECTION INTRAMUSCULAR; INTRAVENOUS; SUBCUTANEOUS
Status: DISCONTINUED | OUTPATIENT
Start: 2017-09-06 | End: 2017-09-06 | Stop reason: HOSPADM

## 2017-09-06 RX ORDER — HYDROMORPHONE HYDROCHLORIDE 1 MG/ML
.3-.5 INJECTION, SOLUTION INTRAMUSCULAR; INTRAVENOUS; SUBCUTANEOUS EVERY 10 MIN PRN
Status: DISCONTINUED | OUTPATIENT
Start: 2017-09-06 | End: 2017-09-06 | Stop reason: HOSPADM

## 2017-09-06 RX ORDER — CLINDAMYCIN PHOSPHATE 900 MG/50ML
900 INJECTION, SOLUTION INTRAVENOUS SEE ADMIN INSTRUCTIONS
Status: DISCONTINUED | OUTPATIENT
Start: 2017-09-06 | End: 2017-09-06 | Stop reason: HOSPADM

## 2017-09-06 RX ORDER — FENTANYL CITRATE 50 UG/ML
25-50 INJECTION, SOLUTION INTRAMUSCULAR; INTRAVENOUS
Status: DISCONTINUED | OUTPATIENT
Start: 2017-09-06 | End: 2017-09-06 | Stop reason: HOSPADM

## 2017-09-06 RX ORDER — KETOROLAC TROMETHAMINE 30 MG/ML
30 INJECTION, SOLUTION INTRAMUSCULAR; INTRAVENOUS EVERY 6 HOURS PRN
Status: DISCONTINUED | OUTPATIENT
Start: 2017-09-06 | End: 2017-09-06 | Stop reason: HOSPADM

## 2017-09-06 RX ORDER — DEXAMETHASONE SODIUM PHOSPHATE 4 MG/ML
INJECTION, SOLUTION INTRA-ARTICULAR; INTRALESIONAL; INTRAMUSCULAR; INTRAVENOUS; SOFT TISSUE PRN
Status: DISCONTINUED | OUTPATIENT
Start: 2017-09-06 | End: 2017-09-06

## 2017-09-06 RX ORDER — ONDANSETRON 2 MG/ML
4 INJECTION INTRAMUSCULAR; INTRAVENOUS EVERY 30 MIN PRN
Status: DISCONTINUED | OUTPATIENT
Start: 2017-09-06 | End: 2017-09-06 | Stop reason: HOSPADM

## 2017-09-06 RX ORDER — CLINDAMYCIN PHOSPHATE 900 MG/50ML
900 INJECTION, SOLUTION INTRAVENOUS
Status: DISCONTINUED | OUTPATIENT
Start: 2017-09-06 | End: 2017-09-06 | Stop reason: HOSPADM

## 2017-09-06 RX ORDER — ACETAMINOPHEN 325 MG/1
325-650 TABLET ORAL EVERY 4 HOURS PRN
Qty: 100 TABLET | Refills: 0 | COMMUNITY
Start: 2017-09-06

## 2017-09-06 RX ORDER — ONDANSETRON 4 MG/1
4 TABLET, ORALLY DISINTEGRATING ORAL EVERY 30 MIN PRN
Status: DISCONTINUED | OUTPATIENT
Start: 2017-09-06 | End: 2017-09-06 | Stop reason: HOSPADM

## 2017-09-06 RX ORDER — OXYCODONE HYDROCHLORIDE 5 MG/1
5-10 TABLET ORAL
Qty: 45 TABLET | Refills: 0 | Status: SHIPPED | OUTPATIENT
Start: 2017-09-06 | End: 2017-09-15

## 2017-09-06 RX ORDER — LIDOCAINE 40 MG/G
CREAM TOPICAL
Status: DISCONTINUED | OUTPATIENT
Start: 2017-09-06 | End: 2017-09-06 | Stop reason: HOSPADM

## 2017-09-06 RX ORDER — SODIUM CHLORIDE, SODIUM LACTATE, POTASSIUM CHLORIDE, CALCIUM CHLORIDE 600; 310; 30; 20 MG/100ML; MG/100ML; MG/100ML; MG/100ML
INJECTION, SOLUTION INTRAVENOUS CONTINUOUS
Status: DISCONTINUED | OUTPATIENT
Start: 2017-09-06 | End: 2017-09-06 | Stop reason: HOSPADM

## 2017-09-06 RX ORDER — OXYCODONE HYDROCHLORIDE 5 MG/1
5-10 TABLET ORAL
Status: COMPLETED | OUTPATIENT
Start: 2017-09-06 | End: 2017-09-06

## 2017-09-06 RX ORDER — PHENAZOPYRIDINE HYDROCHLORIDE 200 MG/1
200 TABLET, FILM COATED ORAL ONCE
Status: COMPLETED | OUTPATIENT
Start: 2017-09-06 | End: 2017-09-06

## 2017-09-06 RX ORDER — IBUPROFEN 600 MG/1
600 TABLET, FILM COATED ORAL EVERY 6 HOURS PRN
Qty: 30 TABLET | Refills: 0 | Status: SHIPPED | OUTPATIENT
Start: 2017-09-06 | End: 2017-12-06

## 2017-09-06 RX ORDER — BUPIVACAINE HYDROCHLORIDE 7.5 MG/ML
INJECTION, SOLUTION INTRASPINAL PRN
Status: DISCONTINUED | OUTPATIENT
Start: 2017-09-06 | End: 2017-09-06

## 2017-09-06 RX ORDER — CHLORHEXIDINE GLUCONATE 40 MG/ML
SOLUTION TOPICAL ONCE
Status: DISCONTINUED | OUTPATIENT
Start: 2017-09-06 | End: 2017-09-06 | Stop reason: HOSPADM

## 2017-09-06 RX ORDER — NALOXONE HYDROCHLORIDE 0.4 MG/ML
.1-.4 INJECTION, SOLUTION INTRAMUSCULAR; INTRAVENOUS; SUBCUTANEOUS
Status: DISCONTINUED | OUTPATIENT
Start: 2017-09-06 | End: 2017-09-06 | Stop reason: HOSPADM

## 2017-09-06 RX ORDER — LIDOCAINE HYDROCHLORIDE 10 MG/ML
INJECTION, SOLUTION INFILTRATION; PERINEURAL PRN
Status: DISCONTINUED | OUTPATIENT
Start: 2017-09-06 | End: 2017-09-06 | Stop reason: HOSPADM

## 2017-09-06 RX ORDER — ACETAMINOPHEN 325 MG/1
975 TABLET ORAL ONCE
Status: COMPLETED | OUTPATIENT
Start: 2017-09-06 | End: 2017-09-06

## 2017-09-06 RX ORDER — LIDOCAINE HYDROCHLORIDE 10 MG/ML
INJECTION, SOLUTION INFILTRATION; PERINEURAL PRN
Status: DISCONTINUED | OUTPATIENT
Start: 2017-09-06 | End: 2017-09-06

## 2017-09-06 RX ORDER — FENTANYL CITRATE 50 UG/ML
INJECTION, SOLUTION INTRAMUSCULAR; INTRAVENOUS PRN
Status: DISCONTINUED | OUTPATIENT
Start: 2017-09-06 | End: 2017-09-06

## 2017-09-06 RX ORDER — ONDANSETRON 2 MG/ML
INJECTION INTRAMUSCULAR; INTRAVENOUS PRN
Status: DISCONTINUED | OUTPATIENT
Start: 2017-09-06 | End: 2017-09-06

## 2017-09-06 RX ORDER — IBUPROFEN 200 MG
600 TABLET ORAL
Status: COMPLETED | OUTPATIENT
Start: 2017-09-06 | End: 2017-09-06

## 2017-09-06 RX ORDER — ONDANSETRON 4 MG/1
4 TABLET, ORALLY DISINTEGRATING ORAL
Status: DISCONTINUED | OUTPATIENT
Start: 2017-09-06 | End: 2017-09-06 | Stop reason: HOSPADM

## 2017-09-06 RX ORDER — ONDANSETRON 4 MG/1
4-8 TABLET, ORALLY DISINTEGRATING ORAL EVERY 8 HOURS PRN
Qty: 10 TABLET | Refills: 0 | Status: SHIPPED | OUTPATIENT
Start: 2017-09-06 | End: 2017-12-06

## 2017-09-06 RX ADMIN — ACETAMINOPHEN 975 MG: 325 TABLET, FILM COATED ORAL at 08:22

## 2017-09-06 RX ADMIN — FENTANYL CITRATE 50 MCG: 50 INJECTION, SOLUTION INTRAMUSCULAR; INTRAVENOUS at 09:27

## 2017-09-06 RX ADMIN — ONDANSETRON 4 MG: 2 INJECTION INTRAMUSCULAR; INTRAVENOUS at 09:22

## 2017-09-06 RX ADMIN — PHENAZOPYRIDINE HYDROCHLORIDE 200 MG: 200 TABLET, COATED ORAL at 08:22

## 2017-09-06 RX ADMIN — SODIUM CHLORIDE, POTASSIUM CHLORIDE, SODIUM LACTATE AND CALCIUM CHLORIDE: 600; 310; 30; 20 INJECTION, SOLUTION INTRAVENOUS at 11:34

## 2017-09-06 RX ADMIN — FENTANYL CITRATE 50 MCG: 50 INJECTION INTRAMUSCULAR; INTRAVENOUS at 12:30

## 2017-09-06 RX ADMIN — LIDOCAINE HYDROCHLORIDE 3 ML: 10 INJECTION, SOLUTION INFILTRATION; PERINEURAL at 09:16

## 2017-09-06 RX ADMIN — HYDROMORPHONE HYDROCHLORIDE 0.5 MG: 1 INJECTION, SOLUTION INTRAMUSCULAR; INTRAVENOUS; SUBCUTANEOUS at 15:07

## 2017-09-06 RX ADMIN — HYDROXYZINE HYDROCHLORIDE 25 MG: 25 TABLET ORAL at 12:02

## 2017-09-06 RX ADMIN — SODIUM CHLORIDE, POTASSIUM CHLORIDE, SODIUM LACTATE AND CALCIUM CHLORIDE: 600; 310; 30; 20 INJECTION, SOLUTION INTRAVENOUS at 15:55

## 2017-09-06 RX ADMIN — BUPIVACAINE HYDROCHLORIDE IN DEXTROSE 1.8 ML: 7.5 INJECTION, SOLUTION SUBARACHNOID at 09:16

## 2017-09-06 RX ADMIN — ONDANSETRON 4 MG: 2 INJECTION INTRAMUSCULAR; INTRAVENOUS at 13:11

## 2017-09-06 RX ADMIN — PHENYLEPHRINE HYDROCHLORIDE 100 MCG: 10 INJECTION, SOLUTION INTRAMUSCULAR; INTRAVENOUS; SUBCUTANEOUS at 10:01

## 2017-09-06 RX ADMIN — MIDAZOLAM HYDROCHLORIDE 3 MG: 1 INJECTION, SOLUTION INTRAMUSCULAR; INTRAVENOUS at 09:22

## 2017-09-06 RX ADMIN — LIDOCAINE HYDROCHLORIDE 1 ML: 10 INJECTION, SOLUTION EPIDURAL; INFILTRATION; INTRACAUDAL; PERINEURAL at 08:25

## 2017-09-06 RX ADMIN — SODIUM CHLORIDE 700 ML: 900 IRRIGANT IRRIGATION at 10:19

## 2017-09-06 RX ADMIN — FENTANYL CITRATE 50 MCG: 50 INJECTION INTRAMUSCULAR; INTRAVENOUS at 11:53

## 2017-09-06 RX ADMIN — PHENYLEPHRINE HYDROCHLORIDE 100 MCG: 10 INJECTION, SOLUTION INTRAMUSCULAR; INTRAVENOUS; SUBCUTANEOUS at 09:32

## 2017-09-06 RX ADMIN — KETOROLAC TROMETHAMINE 30 MG: 30 INJECTION, SOLUTION INTRAMUSCULAR at 11:55

## 2017-09-06 RX ADMIN — VASOPRESSIN 6 ML: 20 INJECTION INTRAMUSCULAR; SUBCUTANEOUS at 09:46

## 2017-09-06 RX ADMIN — DEXAMETHASONE SODIUM PHOSPHATE 4 MG: 4 INJECTION, SOLUTION INTRA-ARTICULAR; INTRALESIONAL; INTRAMUSCULAR; INTRAVENOUS; SOFT TISSUE at 09:22

## 2017-09-06 RX ADMIN — IBUPROFEN 600 MG: 600 TABLET ORAL at 15:07

## 2017-09-06 RX ADMIN — SODIUM CHLORIDE, POTASSIUM CHLORIDE, SODIUM LACTATE AND CALCIUM CHLORIDE: 600; 310; 30; 20 INJECTION, SOLUTION INTRAVENOUS at 10:29

## 2017-09-06 RX ADMIN — PHENYLEPHRINE HYDROCHLORIDE 100 MCG: 10 INJECTION, SOLUTION INTRAMUSCULAR; INTRAVENOUS; SUBCUTANEOUS at 09:22

## 2017-09-06 RX ADMIN — MIDAZOLAM HYDROCHLORIDE 2 MG: 1 INJECTION, SOLUTION INTRAMUSCULAR; INTRAVENOUS at 09:10

## 2017-09-06 RX ADMIN — FENTANYL CITRATE 50 MCG: 50 INJECTION, SOLUTION INTRAMUSCULAR; INTRAVENOUS at 09:16

## 2017-09-06 RX ADMIN — LIDOCAINE HYDROCHLORIDE 7 ML: 10 INJECTION, SOLUTION INFILTRATION; PERINEURAL at 09:55

## 2017-09-06 RX ADMIN — OXYCODONE HYDROCHLORIDE 10 MG: 10 TABLET, FILM COATED, EXTENDED RELEASE ORAL at 08:22

## 2017-09-06 RX ADMIN — PHENYLEPHRINE HYDROCHLORIDE 100 MCG: 10 INJECTION, SOLUTION INTRAMUSCULAR; INTRAVENOUS; SUBCUTANEOUS at 09:44

## 2017-09-06 RX ADMIN — OXYCODONE HYDROCHLORIDE 10 MG: 5 TABLET ORAL at 10:00

## 2017-09-06 RX ADMIN — PHENYLEPHRINE HYDROCHLORIDE 100 MCG: 10 INJECTION, SOLUTION INTRAMUSCULAR; INTRAVENOUS; SUBCUTANEOUS at 09:50

## 2017-09-06 RX ADMIN — SODIUM CHLORIDE, POTASSIUM CHLORIDE, SODIUM LACTATE AND CALCIUM CHLORIDE: 600; 310; 30; 20 INJECTION, SOLUTION INTRAVENOUS at 08:25

## 2017-09-06 RX ADMIN — PROPOFOL 100 MCG/KG/MIN: 10 INJECTION, EMULSION INTRAVENOUS at 09:22

## 2017-09-06 RX ADMIN — CLINDAMYCIN PHOSPHATE 900 MG: 18 INJECTION, SOLUTION INTRAVENOUS at 09:09

## 2017-09-06 RX ADMIN — GENTAMICIN SULFATE 120 MG: 40 INJECTION, SOLUTION INTRAMUSCULAR; INTRAVENOUS at 08:39

## 2017-09-06 NOTE — DISCHARGE INSTRUCTIONS
Same Day Surgery Discharge Instructions  Special Precautions After Surgery - Adult    1. It is not unusual to feel lightheaded or faint, up to 24 hours after surgery or while taking pain medication.  If you have these symptoms; sit for a few minutes before standing and have someone assist you when getting up.  2. You should rest and relax for the next 24 hours and must have someone stay with you for at least 24 hours after your discharge.  3. DO NOT DRIVE any vehicle or operate mechanical equipment for 24 hours following the end of your surgery.  DO NOT DRIVE while taking narcotic pain medications that have been prescribed by your physician.  If you had a limb operated on, you must be able to use it fully to drive.  4. DO NOT drink alcoholic beverages for 24 hours following surgery or while taking prescription pain medication.  5. Drink clear liquids (apple juice, ginger ale, broth, 7-Up, etc.).  Progress to your regular diet as you feel able.  6. Any questions call your physician and do not make important decisions for 24 hours.          __________________________________________________________________________________________________________________________________  IMPORTANT NUMBERS:    [facility] [place] [facility] Main Number:  [phone], [phone]  Pharmacy:  [phone]  Same Day Surgery:  [phone], Monday - Friday until 8:30 p.m.  Urgent Care:  [phone]  Emergency Room:  [phone]      [facility]:  [phone]                                                                             [facility]:  [phone] option 1  [facility]:  [phone]     OB Clinic:  [phone]   [facility]ty Clinic:  [phone]   Home Medical Equipment: [phone]  [facility]:  [phone]    Breakthrough Bleeding    How/Why does it occur?   There are many causes of breakthrough bleeding onto your dressing. The two most common causes are  increased activity and increased NSAID use.     How is it treated?   The treatment for breakthrough dressing bleeding depends on the cause. For simple problems such as a saturated dressing, you may need to reinforce the dressing with more gauze and tape and put slight pressure on the site.  Call your healthcare provider if something else is causing bleeding.        Nausea and Vomiting  What are nausea and vomiting?   Nausea is the queasy feeling you usually have before you vomit. Vomiting is the forceful emptying (throwing up) of the stomach's contents through the mouth.   What causes nausea and vomiting?   Nausea and vomiting are symptoms that may occur with many conditions, such as:   Anesthesia medications   side effect of narcotic medicines  exposure to unpleasant odors or sights   stress and anxiety     How is it treated?   At first you should rest your stomach for a few hours by eating nothing solid and sipping only clear liquids. A little later you can eat soft bland foods that are easy to digest.   It is important to drink small amounts (1 to 4 ounces) often so that you do not become dehydrated. Gradually drink larger amounts of the clear fluids. If you vomit, wait an hour, then start over with a smaller amount of fluid.   Eat slowly and avoid foods that are acidic, spicy, fatty, or fibrous (such as meats, coarse grains, and raw vegetables). Also avoid extremely hot or cold food. In addition, avoid dairy products if you have diarrhea. You may start eating your normal diet again in 3 days or so, when all signs of illness have passed.   Rest as much as possible. Sit or lie down with your head propped up. Do not lie flat for at least 2 hours after eating. Nausea and vomiting usually last only a short period of time. If you have cramping or pain in your belly you can try putting a heating pad set at low or a covered hot water bottle on your belly. Never set a heating pad on high because you could get burned.    If you have been vomiting for more than a day or have had diarrhea for over 3 days, you may need to have an exam by your provider, including a check for dehydration. If you are very dehydrated, you may need to be given fluids intravenously (IV). In children and older adults dehydration can quickly become life threatening.   When should I call my healthcare provider?   Talk with your provider if you are unable to keep fluids down for more than 12 hours or if you have any of the following symptoms with nausea and vomiting:   severe headache or neck ache, or stiff neck   severe abdominal pain   diarrhea and vomiting that last more than 24 hours   blood in the vomited material that may look red, brown, or black, or like coffee grounds   bloody diarrhea   very forceful vomiting   signs of dehydration such as dry mouth, excessive thirst, little or no urination, severe weakness, dizziness, or lightheadedness.   If you have nausea and pain in the jaw, arm, shoulder, chest, or back; sweating; shortness of breath; or lightheadedness; call 911 for emergency care.     Post-operative Infection  What is a wound infection?    watch for signs of infection. Signs that the wound/incision is infected include:   Pus or cloudy fluid draining from the incision.   A pimple or yellow crust forming on the incision   The scab is increasing in size.   Increasing redness occurs around the incisions  A red streak is spreading from the wound toward the heart.   The incision has become extremely tender and/or hot   The lymph node draining that area of skin may become large and tender.   You may develop a fever over 100?F (37.8?C).     What is the cause?   Most skin infections follow breaks in the skin (for example, surgery,  from cuts, puncture wounds, animal bites, splinters, thorns, or burns). Bacteria (especially staphylococcus or streptococcus) then invade the wound and cause the infection.    Deeper wounds (like surgical incisions) are  much more likely to become infected than superficial wounds (for example, scrapes).     What is the treatment?   Call your doctor's clinic if you feel you have the beginnings of an infection   Antibiotics You will probably need antibiotics prescribed by your healthcare provider. This medicine will kill the germs that are causing the wound infection. Try not to forget any of the doses.  Even if you feel better in a few days, take the antibiotic until it is completely gone to keep the infection from flaring up again.    Fever and pain relief Take acetaminophen or ibuprofen if you develop a fever over 102?F (39?C)    How can I help prevent infections?   Wash around all new incisions vigorously with soap and water for 5 to 10 minutes to remove dirt and bacteria.      When should I call my child's healthcare provider?   Call IMMEDIATELY if:   The redness keeps spreading.   The wound becomes extremely painful.   Call during office hours if:   The fever is not gone 48 hours after you start taking an antibiotic.   The wound infection does not look better 3 days after your start taking an antibiotic.   The wound isn't completely healed within 10 days.   You have other questions or concerns.

## 2017-09-06 NOTE — H&P (VIEW-ONLY)
[facility]  [address] [place] [id number]  [phone]  Dept: [phone]    PRE-OP EVALUATION:  Today's date: 2017    Lauren Macdonald (: 1975) presents for pre-operative evaluation assessment as requested by Dr. Mendes.  She requires evaluation and anesthesia risk assessment prior to undergoing surgery/procedure for treatment of excessive menstration .  Proposed procedure: Vaginal Hysterectomy and Cystoscopy    Date of Surgery/ Procedure: 2017  Time of Surgery/ Procedure:   Hospital/Surgical Facility: [facility]  Fax number for surgical facility:   Primary Physician: Duke Abad  Type of Anesthesia Anticipated: General    Patient has a Health Care Directive or Living Will:  NO    1. NO - Do you have a history of heart attack, stroke, stent, bypass or surgery on an artery in the head, neck, heart or legs?  2. NO - Do you ever have any pain or discomfort in your chest?  3. NO - Do you have a history of  Heart Failure?  4. NO - Are you troubled by shortness of breath when: walking on the level, up a slight hill or at night?  5. NO - Do you currently have a cold, bronchitis or other respiratory infection?  6. NO - Do you have a cough, shortness of breath or wheezing?  7. NO - Do you sometimes get pains in the calves of your legs when you walk?  8. NO - Do you or anyone in your family have previous history of blood clots?  9. NO - Do you or does anyone in your family have a serious bleeding problem such as prolonged bleeding following surgeries or cuts?  10. NO - Have you ever had problems with anemia or been told to take iron pills?  11. NO - Have you had any abnormal blood loss such as black, tarry or bloody stools, or abnormal vaginal bleeding?  12. NO - Have you ever had a blood transfusion?  13. NO - Have you or any of your relatives ever had problems with anesthesia?  14. NO - Do you have sleep apnea, excessive snoring or daytime drowsiness?  15. NO - Do  "you have any prosthetic heart valves?  16. NO - Do you have prosthetic joints?  17. NO - Is there any chance that you may be pregnant?        HPI:                                                    She has had abnormal vaginal bleeding in the past 6 months or more.  Menses heavy with cramping and clots.  She has had a previous tubal ligation.  She has been on oral contraceptive in the past.  A pelvic ultrasound was normal in July.  An endometrial biopsy was also OK.    She has consulted OB/GYN about possible treatments and has elected to undergo a vaginal hysterectomy.     MEDICAL HISTORY:                                                    Patient Active Problem List    Diagnosis Date Noted     Excessive or frequent menstruation 07/25/2017     Priority: Medium     ASCUS with positive high risk HPV cervical 06/30/2017     Priority: Medium     6/30/17 ASCUS Pap, + HR HPV (not 16/18). Plan colp       Anxiety 06/01/2015     Priority: Medium     Esophageal reflux 11/19/2013     Priority: Medium     CARDIOVASCULAR SCREENING; LDL GOAL LESS THAN 160 10/31/2010     Priority: Medium      Past Medical History:   Diagnosis Date     Mononucleosis      Postpartum hemorrhage      Reaction, situational     \"depression\" during divorce     Past Surgical History:   Procedure Laterality Date     D & C      postpartum hemorrhage     ESOPHAGOSCOPY, GASTROSCOPY, DUODENOSCOPY (EGD), COMBINED N/A 10/6/2014    Procedure: COMBINED ESOPHAGOSCOPY, GASTROSCOPY, DUODENOSCOPY (EGD), BIOPSY SINGLE OR MULTIPLE;  Surgeon: Sina Pimentel MD;  Location: [facility]     HC BREATH HYDROGEN TEST N/A 11/20/2015    Procedure: HYDROGEN BREATH TEST;  Surgeon: Jordan Davalos MD;  Location:  GI     TUBAL LIGATION  05/19/03    Postpartum tubal fimbriectomy     Current Outpatient Prescriptions   Medication Sig Dispense Refill     Probiotic Product (PROBIOTIC ADVANCED PO) Take 1 capsule by mouth daily        IBUPROFEN PO        OTC products: None, except as " "noted above    Allergies   Allergen Reactions     Omnicef [Cefdinir] GI Disturbance      Latex Allergy: NO    Social History   Substance Use Topics     Smoking status: Never Smoker     Smokeless tobacco: Never Used     Alcohol use No      Comment: rare     History   Drug Use No     Family History :  ============  No family history of bleeding or anesthesia problems.     REVIEW OF SYSTEMS:                                                    ROS:  General: No change in weight, sleep or appetite.  Normal energy.  No fever or chills  Eyes: Negative for vision changes or eye problems  ENT: No problems with ears, nose or throat.  No difficulty swallowing.  Resp: No coughing, wheezing or shortness of breath  CV: No chest pains or palpitations  GI: No nausea, vomiting,  heartburn, abdominal pain, diarrhea, constipation or change in bowel habits  : No urinary frequency or dysuria, bladder or kidney problems  Musculoskeletal: POSITIVE  for:, back pain, sees chiropractor   Neurologic: No headaches, numbness, tingling, weakness, problems with balance or coordination  Psychiatric: No problems with anxiety, depression or mental health  Heme/immune/allergy: No history of bleeding or clotting problems or anemia.  No allergies or immune system problems  Endocrine: No history of thyroid disease, diabetes or other endocrine disorders  Skin: No rashes,worrisome lesions or skin problems     EXAM:                                                    OBJECTIVE:Blood pressure 110/64, pulse 70, resp. rate 14, height 5' 4\" (1.626 m), weight 125 lb (56.7 kg), last menstrual period 08/21/2017, not currently breastfeeding. BMI=Body mass index is 21.46 kg/(m^2).  GENERAL APPEARANCE ADULT: Alert, no acute distress, healthy appearance  EYES: PERRL, EOM normal, conjunctiva and lids normal  HENT: Ears and TMs normal, oral mucosa and posterior oropharynx normal  NECK: No adenopathy,masses or thyromegaly  RESP: lungs clear to auscultation   CV: normal " rate, regular rhythm, no murmur or gallop  ABDOMEN: soft, no organomegaly, masses or tenderness  MS: extremities normal, no peripheral edema     DIAGNOSTICS:                                                    NO EKG needed.  preop labs per her surgeon.     Recent Labs   Lab Test  08/06/17   1155  07/10/17   0700   HGB  12.9  11.6*   PLT  269  264   NA  138  140   POTASSIUM  4.0  3.7   CR  0.85  0.87        IMPRESSION:                                                    Reason for surgery/procedure: menorrhagia    The proposed surgical procedure is considered INTERMEDIATE risk.    REVISED CARDIAC RISK INDEX  The patient has the following serious cardiovascular risks for perioperative complications such as (MI, PE, VFib and 3  AV Block):  No serious cardiac risks  INTERPRETATION: 0 risks: Class I (very low risk - 0.4% complication rate)    The patient has the following additional risks for perioperative complications:  No identified additional risks      ICD-10-CM    1. Preop general physical exam Z01.818    2. Excessive or frequent menstruation N92.0        RECOMMENDATIONS:                                                      APPROVAL GIVEN to proceed with proposed procedure, without further diagnostic evaluation       Signed Electronically by: Duke Abad MD    Copy of this evaluation report is provided to requesting physician.    [facility] Preop Guidelines

## 2017-09-06 NOTE — ANESTHESIA PROCEDURE NOTES
Peripheral nerve/Neuraxial procedure note : intrathecal  Pre-Procedure  Performed by  LESLY JUAREZ   Location: OR      Pre-Anesthestic Checklist: patient identified, risks and benefits discussed, informed consent, monitors and equipment checked and pre-op evaluation    Timeout  Correct Patient: Yes   Correct Procedure: Yes   Correct Site: Yes   Correct Laterality: N/A   Correct Position: Yes   Site Marked: N/A   .   Procedure Documentation  ASA 1  .    Procedure:    Intrathecal.  Insertion Site:L4-5  (midline approach)      Patient Prep;mask, sterile gloves, povidone-iodine 7.5% surgical scrub, patient draped.  .  Needle: Yareli tip Spinal Needle (gauge): 25  Spinal/LP Needle Length (inches): 3.5 # of attempts: 1 and # of redirects:  Introducer used Introducer: 20 G .       Assessment/Narrative  Paresthesias: No.  .  .  clear CSF fluid removed . Sensory Level: T7

## 2017-09-06 NOTE — INTERVAL H&P NOTE
Proceed with total vaginal hysterectomy.  Planning on keeping ovaries.  Reviewed surgery.  All questions reviewed.      Sadie Mendes M.D.

## 2017-09-06 NOTE — OR NURSING
Pt up in chair with one assist. A little unsteady on feet. I sitting in recliner at bedside. Hunt intact. Draining orange colored urine. IV patent. States pain is better. Nausea went away. Drinking fluids.VSS.

## 2017-09-06 NOTE — BRIEF OP NOTE
Brief Op Note    Preop Dx: menorrhagia  Postop DX: same  Procedure: total vaginal hysterectomy   Surgeon: Sadie Mendes M.D.   Assist: Monique Obregon M.D. (A surgical assistant was required for this surgery for her experience with retraction, achievement of hemostasis, and wound closure)  Anesthesia: spinal  FRA Score: 2  EBL: 100  IVF: see anesthesia records  UOP: see anesthesia records  Complications: none  Findings: 8 week sized uterus, normal appearing ovaries, previously transected fallopian tubes, bilateral ureteral efflux  Disposition: stable

## 2017-09-06 NOTE — IP AVS SNAPSHOT
MRN:[id number]                      After Visit Summary   9/6/2017    Lauren Macdonald    MRN: [id number]           Thank you!     Thank you for choosing [facility] for your care. Our goal is always to provide you with excellent care. Hearing back from our patients is one way we can continue to improve our services. Please take a few minutes to complete the written survey that you may receive in the mail after you visit with us. Thank you!        Patient Information     Date Of Birth          1975        About your hospital stay     You were admitted on:  September 6, 2017 You last received care in the:  [facility] PreOP/Phase II    You were discharged on:  September 6, 2017       Who to Call     For medical emergencies, please call 911.  For non-urgent questions about your medical care, please call your primary care provider or clinic, [phone]  For questions related to your surgery, please call your surgery clinic        Attending Provider     Provider Specialty    Sadie Mendes MD OB/Gyn       Primary Care Provider Office Phone # Fax #    [facility]ad [phone]      After Care Instructions     Discharge Instructions       Pelvic Rest. No tampons, douching or intercourse for  6  weeks.            Discharge Instructions       Patient to arrange follow up appointment in 6 weeks            No lifting       No heavy lifting and no strenuous physical activity.  For 6 weeks                  Further instructions from your care team                           Same Day Surgery Discharge Instructions  Special Precautions After Surgery - Adult    1. It is not unusual to feel lightheaded or faint, up to 24 hours after surgery or while taking pain medication.  If you have these symptoms; sit for a few minutes before standing and have someone assist you when getting up.  2. You should rest and relax for the next 24 hours and must have someone stay with you for at least  24 hours after your discharge.  3. DO NOT DRIVE any vehicle or operate mechanical equipment for 24 hours following the end of your surgery.  DO NOT DRIVE while taking narcotic pain medications that have been prescribed by your physician.  If you had a limb operated on, you must be able to use it fully to drive.  4. DO NOT drink alcoholic beverages for 24 hours following surgery or while taking prescription pain medication.  5. Drink clear liquids (apple juice, ginger ale, broth, 7-Up, etc.).  Progress to your regular diet as you feel able.  6. Any questions call your physician and do not make important decisions for 24 hours.          __________________________________________________________________________________________________________________________________  IMPORTANT NUMBERS:    [facility] Main Number:  [phone], [phone]  Pharmacy:  [phone]  Same Day Surgery:  [phone], Monday - Friday until 8:30 p.m.  Urgent Care:  [phone]  Emergency Room:  [phone]      [facility]:  [phone]                                                                             [facility]:  [phone] option 1  [facility]:  [phone]     OB Clinic:  [phone]   Surgery Specialty Clinic:  [phone]   Home Medical Equipment: [phone]  [facility]:  [phone]    Breakthrough Bleeding    How/Why does it occur?   There are many causes of breakthrough bleeding onto your dressing. The two most common causes are increased activity and increased NSAID use.     How is it treated?   The treatment for breakthrough dressing bleeding depends on the cause. For simple problems such as a saturated dressing, you may need to reinforce the dressing with more gauze and tape and put slight pressure on the site.  Call your healthcare provider if something else is causing bleeding.        Nausea and Vomiting  What are nausea and vomiting?   Nausea is the  queasy feeling you usually have before you vomit. Vomiting is the forceful emptying (throwing up) of the stomach's contents through the mouth.   What causes nausea and vomiting?   Nausea and vomiting are symptoms that may occur with many conditions, such as:   Anesthesia medications   side effect of narcotic medicines  exposure to unpleasant odors or sights   stress and anxiety     How is it treated?   At first you should rest your stomach for a few hours by eating nothing solid and sipping only clear liquids. A little later you can eat soft bland foods that are easy to digest.   It is important to drink small amounts (1 to 4 ounces) often so that you do not become dehydrated. Gradually drink larger amounts of the clear fluids. If you vomit, wait an hour, then start over with a smaller amount of fluid.   Eat slowly and avoid foods that are acidic, spicy, fatty, or fibrous (such as meats, coarse grains, and raw vegetables). Also avoid extremely hot or cold food. In addition, avoid dairy products if you have diarrhea. You may start eating your normal diet again in 3 days or so, when all signs of illness have passed.   Rest as much as possible. Sit or lie down with your head propped up. Do not lie flat for at least 2 hours after eating. Nausea and vomiting usually last only a short period of time. If you have cramping or pain in your belly you can try putting a heating pad set at low or a covered hot water bottle on your belly. Never set a heating pad on high because you could get burned.   If you have been vomiting for more than a day or have had diarrhea for over 3 days, you may need to have an exam by your provider, including a check for dehydration. If you are very dehydrated, you may need to be given fluids intravenously (IV). In children and older adults dehydration can quickly become life threatening.   When should I call my healthcare provider?   Talk with your provider if you are unable to keep fluids down for  more than 12 hours or if you have any of the following symptoms with nausea and vomiting:   severe headache or neck ache, or stiff neck   severe abdominal pain   diarrhea and vomiting that last more than 24 hours   blood in the vomited material that may look red, brown, or black, or like coffee grounds   bloody diarrhea   very forceful vomiting   signs of dehydration such as dry mouth, excessive thirst, little or no urination, severe weakness, dizziness, or lightheadedness.   If you have nausea and pain in the jaw, arm, shoulder, chest, or back; sweating; shortness of breath; or lightheadedness; call 911 for emergency care.     Post-operative Infection  What is a wound infection?    watch for signs of infection. Signs that the wound/incision is infected include:   Pus or cloudy fluid draining from the incision.   A pimple or yellow crust forming on the incision   The scab is increasing in size.   Increasing redness occurs around the incisions  A red streak is spreading from the wound toward the heart.   The incision has become extremely tender and/or hot   The lymph node draining that area of skin may become large and tender.   You may develop a fever over 100?F (37.8?C).     What is the cause?   Most skin infections follow breaks in the skin (for example, surgery,  from cuts, puncture wounds, animal bites, splinters, thorns, or burns). Bacteria (especially staphylococcus or streptococcus) then invade the wound and cause the infection.    Deeper wounds (like surgical incisions) are much more likely to become infected than superficial wounds (for example, scrapes).     What is the treatment?   Call your doctor's clinic if you feel you have the beginnings of an infection   Antibiotics You will probably need antibiotics prescribed by your healthcare provider. This medicine will kill the germs that are causing the wound infection. Try not to forget any of the doses.  Even if you feel better in a few days, take the  "antibiotic until it is completely gone to keep the infection from flaring up again.    Fever and pain relief Take acetaminophen or ibuprofen if you develop a fever over 102?F (39?C)    How can I help prevent infections?   Wash around all new incisions vigorously with soap and water for 5 to 10 minutes to remove dirt and bacteria.      When should I call my child's healthcare provider?   Call IMMEDIATELY if:   The redness keeps spreading.   The wound becomes extremely painful.   Call during office hours if:   The fever is not gone 48 hours after you start taking an antibiotic.   The wound infection does not look better 3 days after your start taking an antibiotic.   The wound isn't completely healed within 10 days.   You have other questions or concerns.       Pending Results     No orders found from 2017 to 2017.            Admission Information     Date & Time Provider Department Dept. Phone    2017 Sadie Mendes MD [facility] PreOP/Phase [phone]      Your Vitals Were     Blood Pressure Temperature Respirations Height Weight Last Period    140/81 98.2  F (36.8  C) (Axillary) 14 1.626 m (5' 4\") 56.7 kg (125 lb) 2017 (Approximate)    Pulse Oximetry BMI (Body Mass Index)                99% 21.46 kg/m2          [facility] Information     [facility] lets you send messages to your doctor, view your test results, renew your prescriptions, schedule appointments and more. To sign up, go to [email or web address] . Click on \"Log in\" on the left side of the screen, which will take you to the Welcome page. Then click on \"Sign up Now\" on the right side of the page.     You will be asked to enter the access code listed below, as well as some personal information. Please follow the directions to create your username and password.     Your access code is: [email or web address]  Expires: 10/15/2017 12:12 PM     Your access code will  in 90 days. If you need help or a new code, please call your [facility] " clinic or [phone].        Care EveryWhere ID     This is your Care EveryWhere ID. This could be used by other organizations to access your [facility] medical records  [id number]        Equal Access to Services     MADDIE CHANEY : Hadii aad ku hadsami Swan, watoshada luqadaha, qaybta kaalmada kayli, marianne holt dovclarence alarcon cheli falcon. So [facility] [phone].    ATENCIÓN: Si habla español, tiene a earl disposición servicios gratuitos de asistencia lingüística. Llame al [phone].    We comply with applicable federal civil rights laws and [place] laws. We do not discriminate on the basis of race, color, national origin, age, disability sex, sexual orientation or gender identity.               Review of your medicines      START taking        Dose / Directions    acetaminophen 325 MG tablet   Commonly known as:  TYLENOL   Used for:  Excessive or frequent menstruation        Dose:  325-650 mg   Take 1-2 tablets (325-650 mg) by mouth every 4 hours as needed for other (mild pain)   Quantity:  100 tablet   Refills:  0       ondansetron 4 MG ODT tab   Commonly known as:  ZOFRAN-ODT   Used for:  Excessive or frequent menstruation        Dose:  4-8 mg   Take 1-2 tablets (4-8 mg) by mouth every 8 hours as needed for nausea Dissolve ON the tongue.   Quantity:  10 tablet   Refills:  0       oxyCODONE 5 MG IR tablet   Commonly known as:  ROXICODONE   Used for:  Excessive or frequent menstruation        Dose:  5-10 mg   Take 1-2 tablets (5-10 mg) by mouth every 3 hours as needed for pain or other (Moderate to Severe)   Quantity:  45 tablet   Refills:  0         CONTINUE these medicines which may have CHANGED, or have new prescriptions. If we are uncertain of the size of tablets/capsules you have at home, strength may be listed as something that might have changed.        Dose / Directions    * IBUPROFEN PO   This may have changed:  Another medication with the same name was added. Make sure you understand how and  when to take each.        Refills:  0       * ibuprofen 600 MG tablet   Commonly known as:  ADVIL/MOTRIN   This may have changed:  You were already taking a medication with the same name, and this prescription was added. Make sure you understand how and when to take each.   Used for:  Excessive or frequent menstruation        Dose:  600 mg   Take 1 tablet (600 mg) by mouth every 6 hours as needed for pain (mild)   Quantity:  30 tablet   Refills:  0       * Notice:  This list has 2 medication(s) that are the same as other medications prescribed for you. Read the directions carefully, and ask your doctor or other care provider to review them with you.      CONTINUE these medicines which have NOT CHANGED        Dose / Directions    PROBIOTIC ADVANCED PO        Dose:  1 capsule   Take 1 capsule by mouth daily   Refills:  0            Where to get your medicines      These medications were sent to [facility] [place], [place] - [address]  [address] [address]     Phone:  [phone]     ibuprofen 600 MG tablet    ondansetron 4 MG ODT tab         Some of these will need a paper prescription and others can be bought over the counter. Ask your nurse if you have questions.     Bring a paper prescription for each of these medications     oxyCODONE 5 MG IR tablet       You don't need a prescription for these medications     acetaminophen 325 MG tablet                Protect others around you: Learn how to safely use, store and throw away your medicines at www.disposemymeds.org.             Medication List: This is a list of all your medications and when to take them. Check marks below indicate your daily home schedule. Keep this list as a reference.      Medications           Morning Afternoon Evening Bedtime As Needed    acetaminophen 325 MG tablet   Commonly known as:  TYLENOL   Take 1-2 tablets (325-650 mg) by mouth every 4 hours as needed for other (mild pain)   Last time this was  given:  975 mg on 9/6/2017  8:22 AM                                * IBUPROFEN PO   Last time this was given:  600 mg on 9/6/2017  3:07 PM                                * ibuprofen 600 MG tablet   Commonly known as:  ADVIL/MOTRIN   Take 1 tablet (600 mg) by mouth every 6 hours as needed for pain (mild)   Last time this was given:  600 mg on 9/6/2017  3:07 PM                                ondansetron 4 MG ODT tab   Commonly known as:  ZOFRAN-ODT   Take 1-2 tablets (4-8 mg) by mouth every 8 hours as needed for nausea Dissolve ON the tongue.                                oxyCODONE 5 MG IR tablet   Commonly known as:  ROXICODONE   Take 1-2 tablets (5-10 mg) by mouth every 3 hours as needed for pain or other (Moderate to Severe)   Last time this was given:  10 mg on 9/6/2017 10:00 AM                                PROBIOTIC ADVANCED PO   Take 1 capsule by mouth daily                                * Notice:  This list has 2 medication(s) that are the same as other medications prescribed for you. Read the directions carefully, and ask your doctor or other care provider to review them with you.

## 2017-09-06 NOTE — IP AVS SNAPSHOT
[facility] PreOP/Phase II    [address] [address]    Phone:  [phone]    Fax:  [phone]                                       After Visit Summary   9/6/2017    Lauren Macdonald    MRN: [id number]           After Visit Summary Signature Page     I have received my discharge instructions, and my questions have been answered. I have discussed any challenges I see with this plan with the nurse or doctor.    ..........................................................................................................................................  Patient/Patient Representative Signature      ..........................................................................................................................................  Patient Representative Print Name and Relationship to Patient    ..................................................               ................................................  Date                                            Time    ..........................................................................................................................................  Reviewed by Signature/Title    ...................................................              ..............................................  Date                                                            Time

## 2017-09-06 NOTE — OP NOTE
Op Note     Pre-op diagnosis: 1. Menorrhagia     Post-op diagnosis: 1. Menorrhagia     Procedure: Total vaginal hysterectomy, cystoscopy      Surgeon: Sadie Mendes M.D.      Assist: Monique Obregon M.D.       Anesthesia:  General Endotracheal          I/O: see anesthesia records     Complications: none     Findings: 8 week sized uterus, normal appearing ovaries, previously transected fallopian tubes, bilateral ureteral efflux.     Indications: Lauren Macdonald is a 42 year old  with a history of increasingly heavier and frequent menses. Her cramps getting worse as well.  They are generally monthly, but sometimes come early. They are lasting 7 days when they were previously 4-5 days. She passes quarter sized clots and has to change both a pad and super plus tampon every 1-2 hours.  Patient desires definitive surgical therapy.  She has had a prior tubal ligation and has no desire for future childbearing.       Reviewed risks, benefits and alternatives of total vaginal hysterectomy including but not limited to bleeding, infection, damage to other organs, and possible need to convert to abdominal approach.  All questions answered.      Procedure: Patient was brought to the operating room and general anesthesia was administered without difficulty.  She was prepped and draped in the usual sterile fashion in the dorsal lithotomy position.  A meraz catheter was placed in the bladder.  A weighted speculum was placed in the vagina.  The anterior and posterior aspects of the cervix were grasped with single toothed tenaculums.  The cervico-vaginal junction was infiltrated with a dilute pitressin solution circumferentially and then incised sharply with the scalpel.  Entry into the posterior and anterior cul-de-sacs was obtained sharply without difficulty.  The posterior cuff was tagged to the peritoneum with 0 vicryl suture.  The uterosacral ligaments were identified bilaterally, clamped with  Brian, cut and suture-ligated with 0 vicryl.  The uterine arteries and cardinal ligament complex were identified bilaterally, clamped, desiccated and transected with the LigaSure device.  The round ligaments and the cornua were identified bilaterally, clamped, dessicated and transected with the LigaSure device.  The specimen was removed and sent to pathology.  The ovaries remain in place.  All areas of surgery were adequately hemostatic.  The cuff was grasped anterior and posterior with Allis clamps.  The cuff was re-approximated with 0-Vicryl suture in a running locked fashion with imbricating and tagging the uterosacral ligaments to the midline vagina.  This was performed in a vertical fashion.  Excellent hemostasis was noted.  All instruments were removed from the vagina.       Attention was turned to the bladder where the meraz catheter was removed.  The cystoscope was introduced and no bladder injuries were noted.  Bilateral ureteral efflux was noted.  The cystoscope was removed and the meraz replaced.     Patient tolerated the procedure well.  Sponge, lap and needle counts were correct.  I was present for the entire procedure.  Patient was taken to recovery room extubated in stable condition.  Opal-operative antibiotics were given.  A surgical assistant was required for this surgery for her experience with retraction, achievement of hemostasis, and wound closure.     Sadie Mendes M.D.

## 2017-09-06 NOTE — ANESTHESIA CARE TRANSFER NOTE
Patient: Lauren Macdonald    Procedure(s):  Total Vaginal Hysterectomy & Cystoscopy - Wound Class: II-Clean Contaminated   - Wound Class: II-Clean Contaminated    Diagnosis: menorrhagia  Diagnosis Additional Information: No value filed.    Anesthesia Type:   Spinal     Note:  Airway :Face Mask  Patient transferred to:PACU        Vitals: (Last set prior to Anesthesia Care Transfer)    CRNA VITALS  9/6/2017 1002 - 9/6/2017 1038      9/6/2017             Pulse: 53    SpO2: 98 %                Electronically Signed By: Grzegorz Echeverria CRNA, APRN CRNA  September 6, 2017  10:38 AM

## 2017-09-06 NOTE — ANESTHESIA PREPROCEDURE EVALUATION
Anesthesia Evaluation     . Pt has had prior anesthetic. Type: General and Regional    No history of anesthetic complications          ROS/MED HX    ENT/Pulmonary:  - neg pulmonary ROS     Neurologic:  - neg neurologic ROS     Cardiovascular:  - neg cardiovascular ROS       METS/Exercise Tolerance:  >4 METS   Hematologic:  - neg hematologic  ROS       Musculoskeletal:  - neg musculoskeletal ROS       GI/Hepatic:     (+) GERD       Renal/Genitourinary:  - ROS Renal section negative       Endo:  - neg endo ROS       Psychiatric:     (+) psychiatric history anxiety      Infectious Disease:  - neg infectious disease ROS       Malignancy:      - no malignancy   Other:    - neg other ROS                 Physical Exam  Normal systems: cardiovascular, pulmonary and dental    Airway   Mallampati: I  TM distance: >3 FB  Neck ROM: full    Dental     Cardiovascular   Rhythm and rate: regular and normal      Pulmonary    breath sounds clear to auscultation                    Anesthesia Plan      History & Physical Review  History and physical reviewed and following examination; no interval change.    ASA Status:  1 .    NPO Status:  > 8 hours    Plan for Spinal   PONV prophylaxis:  Ondansetron (or other 5HT-3) and Dexamethasone or Solumedrol  Pt. Prefers SAB      Postoperative Care  Postoperative pain management:  Neuraxial analgesia, Oral pain medications, IV analgesics and Multi-modal analgesia.      Consents  Anesthetic plan, risks, benefits and alternatives discussed with:  Patient.  Use of blood products discussed: Yes.   Use of blood products discussed with Patient and Spouse.  Consented to blood products.  .                          .

## 2017-09-06 NOTE — ANESTHESIA POSTPROCEDURE EVALUATION
Patient: Lauren Macdonald    Procedure(s):  Total Vaginal Hysterectomy & Cystoscopy - Wound Class: II-Clean Contaminated   - Wound Class: II-Clean Contaminated    Diagnosis:menorrhagia  Diagnosis Additional Information: No value filed.    Anesthesia Type:  Spinal    Note:  Anesthesia Post Evaluation    Patient location during evaluation: Phase 2 and Bedside  Patient participation: Able to fully participate in evaluation  Level of consciousness: awake and alert  Pain management: adequate  Airway patency: patent  Cardiovascular status: acceptable  Respiratory status: acceptable  Hydration status: acceptable  PONV: none     Anesthetic complications: None          Last vitals:  Vitals:    09/06/17 1115 09/06/17 1130 09/06/17 1144   BP: 104/65 106/76 114/62   Resp: 14 16 16   Temp:      SpO2: 100% 99% 100%         Electronically Signed By: Grzegorz Echeverria CRNA, APRN CRNA  September 6, 2017  12:03 PM

## 2017-09-06 NOTE — OR NURSING
Hunt cath removed by Debra HENAO RN.  450 cc orange urine. IV fluids increased per Dr. Mendes. Enc po fluids. Pain getting better. Up to walked 1/2 the butts and back to the room to rest. Will put call light on when she feels the urge to urinate.

## 2017-09-06 NOTE — OR NURSING
Pt up to BR. Voided 100 cc orange urine. VSS. Anxious to go home. IV dc'd. Wheeled out to car in w/c.

## 2017-09-08 LAB — COPATH REPORT: NORMAL

## 2017-09-15 ENCOUNTER — OFFICE VISIT (OUTPATIENT)
Dept: FAMILY MEDICINE | Facility: CLINIC | Age: 42
End: 2017-09-15
Payer: COMMERCIAL

## 2017-09-15 ENCOUNTER — TELEPHONE (OUTPATIENT)
Dept: OBGYN | Facility: CLINIC | Age: 42
End: 2017-09-15

## 2017-09-15 VITALS
RESPIRATION RATE: 14 BRPM | WEIGHT: 120 LBS | TEMPERATURE: 99.9 F | DIASTOLIC BLOOD PRESSURE: 70 MMHG | HEART RATE: 80 BPM | SYSTOLIC BLOOD PRESSURE: 100 MMHG | HEIGHT: 64 IN | BODY MASS INDEX: 20.49 KG/M2

## 2017-09-15 DIAGNOSIS — Z90.710 STATUS POST HYSTERECTOMY: ICD-10-CM

## 2017-09-15 DIAGNOSIS — R10.31 ABDOMINAL PAIN, RIGHT LOWER QUADRANT: Primary | ICD-10-CM

## 2017-09-15 LAB
ALBUMIN UR-MCNC: NEGATIVE MG/DL
APPEARANCE UR: CLEAR
BACTERIA #/AREA URNS HPF: ABNORMAL /HPF
BILIRUB UR QL STRIP: NEGATIVE
COLOR UR AUTO: YELLOW
ERYTHROCYTE [DISTWIDTH] IN BLOOD BY AUTOMATED COUNT: 12 % (ref 10–15)
GLUCOSE UR STRIP-MCNC: NEGATIVE MG/DL
HCT VFR BLD AUTO: 39.6 % (ref 35–47)
HGB BLD-MCNC: 13.4 G/DL (ref 11.7–15.7)
HGB UR QL STRIP: ABNORMAL
KETONES UR STRIP-MCNC: NEGATIVE MG/DL
LEUKOCYTE ESTERASE UR QL STRIP: ABNORMAL
MCH RBC QN AUTO: 31.8 PG (ref 26.5–33)
MCHC RBC AUTO-ENTMCNC: 33.8 G/DL (ref 31.5–36.5)
MCV RBC AUTO: 94 FL (ref 78–100)
NITRATE UR QL: NEGATIVE
NON-SQ EPI CELLS #/AREA URNS LPF: ABNORMAL /LPF
PH UR STRIP: 5.5 PH (ref 5–7)
PLATELET # BLD AUTO: 347 10E9/L (ref 150–450)
RBC # BLD AUTO: 4.21 10E12/L (ref 3.8–5.2)
RBC #/AREA URNS AUTO: ABNORMAL /HPF
SOURCE: ABNORMAL
SP GR UR STRIP: 1.01 (ref 1–1.03)
UROBILINOGEN UR STRIP-ACNC: 0.2 EU/DL (ref 0.2–1)
WBC # BLD AUTO: 10.3 10E9/L (ref 4–11)
WBC #/AREA URNS AUTO: ABNORMAL /HPF

## 2017-09-15 PROCEDURE — 85027 COMPLETE CBC AUTOMATED: CPT | Performed by: FAMILY MEDICINE

## 2017-09-15 PROCEDURE — 81001 URINALYSIS AUTO W/SCOPE: CPT | Performed by: FAMILY MEDICINE

## 2017-09-15 PROCEDURE — 99213 OFFICE O/P EST LOW 20 MIN: CPT | Performed by: FAMILY MEDICINE

## 2017-09-15 PROCEDURE — 36415 COLL VENOUS BLD VENIPUNCTURE: CPT | Performed by: FAMILY MEDICINE

## 2017-09-15 RX ORDER — HYDROCODONE BITARTRATE AND ACETAMINOPHEN 5; 325 MG/1; MG/1
1-2 TABLET ORAL EVERY 6 HOURS PRN
Qty: 15 TABLET | Refills: 0 | Status: SHIPPED | OUTPATIENT
Start: 2017-09-15 | End: 2017-12-06

## 2017-09-15 NOTE — TELEPHONE ENCOUNTER
S-(situation): Pt reports that she started experiencing RLQ pain last night.  Also has URI and running a low grade fever x 2 days.    B-(background): Post hysterectomy 09-06-17    A-(assessment): Pt has no vaginal bleeding but discharge is brownish in color.  She is maintaining hydration and having decreased appetite with nausea no vomiting.  She denies constipation and has been taking a stool softener and last bm was this morning.  She does experience pain with passing her stool.  She denies UTI symptom of frequency or burning but is having urgency and feels like she has pain in her bladder, no blood noted in her urine and urine has been clear yellow, no foul odor.  She reports she has pain with passing her urine but says this is getting better everyday.  Her fever has been running in the 100.1 range and does respond to Tylenol, no chills, no sob and no hx of asthma. She rates her pain about a 5/10.      R-(recommendations): Advised pt to be seen by her pcp since ob/gyn has no available appts.  Pt has appt scheduled with her pcp at 2:20 today.    Gretta Peralta  [facility] RN

## 2017-09-15 NOTE — PATIENT INSTRUCTIONS
ASSESSMENT:   (R10.31) Abdominal pain, right lower quadrant  (primary encounter diagnosis)  Comment: I do not see any sign of infection at this time.  No urinary infection and blood counts are OK.    Plan: CBC with platelets, *UA reflex to Microscopic         and Culture ([place] [facility] (except        [place] and [place]), Urine Microscopic,         HYDROcodone-acetaminophen (NORCO) 5-325 MG per         tablet          If you run a fever, temp of 100.5 or higher, you should be seen and checked out-at the emergency room so that if you need some xrays or US it can be done there.   If pain worse or other symptoms that concern you, come in to get checked.     OK to take the oxycodone/acetaminophen 5/325mg 1-2 every 6 hours as needed.      If pain persistent next week, contact your surgeon.     Heat is OK.  Activity as tolerated.     Pain medication options:  Ibuprofen 200mg OTC may be taken up to 4 pills 4 times a day to the maximum of 3200mg or 16 pills daily as needed for pain.   Naproxen (Aleve) OTC may be taken up to 2 pills 2 times a day to the maximum of 4 pills daily as needed for pain.   Aspirin may be taken up to 2-3 pills every 4 yours as needed for pain.   Take only one type of these at a time and take with food.   Acetaminophen (Tylenol) may be taken up to 4000mg daily which would be 350mg, 2 pills every 4 hours or 500mg (extra strength) 2 pills 4 times a day.  This could be taken with the antiinflammatory medications mentioned above.     (Z90.710) Status post hysterectomy  Comment:   Plan: HYDROcodone-acetaminophen (NORCO) 5-325 MG per         tablet

## 2017-09-15 NOTE — TELEPHONE ENCOUNTER
Pt calling to discuss some symptoms she has had since her hysterectomy last week on 9/6/17, she has been having some pain on her right side, can that be normal? She has had a cold and sore throat with  a low grade fever for the past 2 days.    Please call-     Micki Ball  [facility] [place]

## 2017-09-15 NOTE — PROGRESS NOTES
"  SUBJECTIVE:   Lauren Macdonald is a 42 year old female who presents to clinic today for the following health issues:  Chief Complaint   Patient presents with     Pain     rt sided pain last night.      Rt sided pain       Duration: 1 day    Description (location/character/radiation): rt sided pain    Intensity:  5/10 at this time.   7/10 worse at night or if coughing or sneezing    Accompanying signs and symptoms: low grade temp nothing over 100. Decreased appetite.      History (similar episodes/previous evaluation): None    Precipitating or alleviating factors: Hysterectomy Vag one week ago. Has developed cold symptoms in the last 2 days.    Therapies tried and outcome: Tylenol helps the temp but not much for pain.     Onset of symptoms: last night  Location: right lower quadrant.  Pain has been constant.   Aggravating factors: cough or sneeze.  Has not noted pain with movements.   Appetite decreased.  Some nausea.  No emesis.   Constipated after surgery, stool this AM seemed normal.   Some sore muscles lower abdomen after urinating.  This has been more on right side. No dysuria.  No frequency.  No vaginal discharge today.  A little brownish discharge up to yesterday.     Some sore throat in the past couple days.  Some cough with phlegm yellow.  Some rhinorrhea.  Son with sore throat.    No shortness of breath.   Has felt like she has a fever but no documented temp, has been <100 when checked.     Patient Active Problem List   Diagnosis     CARDIOVASCULAR SCREENING; LDL GOAL LESS THAN 160     Esophageal reflux     Anxiety     ASCUS with positive high risk HPV cervical     Excessive or frequent menstruation      OBJECTIVE:Blood pressure 100/70, pulse 80, temperature 99.9  F (37.7  C), temperature source Tympanic, resp. rate 14, height 5' 4\" (1.626 m), weight 120 lb (54.4 kg), last menstrual period 08/21/2017, not currently breastfeeding. BMI=Body mass index is 20.6 kg/(m^2).  GENERAL APPEARANCE ADULT: Alert, no " acute distress  HENT: Ears and TMs normal, oral mucosa and posterior oropharynx normal  NECK: No adenopathy,masses or thyromegaly  RESP: lungs clear to auscultation   CV: normal rate, regular rhythm, no murmur or gallop  ABDOMEN: soft, no organomegaly or masses , bowel sounds normal, minimal tenderness right suprapubic area.   CBC:normal  UA unremarkable.      ASSESSMENT:   (R10.31) Abdominal pain, right lower quadrant  (primary encounter diagnosis)  Comment: I do not see any sign of infection at this time.  No urinary infection and blood counts are OK.    Plan: CBC with platelets, *UA reflex to Microscopic         and Culture ([facility] (except        [place] and [place]), Urine Microscopic,         HYDROcodone-acetaminophen (NORCO) 5-325 MG per         tablet          If you run a fever, temp of 100.5 or higher, you should be seen and checked out-at the emergency room so that if you need some xrays or US it can be done there.   If pain worse or other symptoms that concern you, come in to get checked.     OK to take the oxycodone/acetaminophen 5/325mg 1-2 every 6 hours as needed.      If pain persistent next week, contact your surgeon.     Heat is OK.  Activity as tolerated.     Pain medication options:  Ibuprofen 200mg OTC may be taken up to 4 pills 4 times a day to the maximum of 3200mg or 16 pills daily as needed for pain.   Naproxen (Aleve) OTC may be taken up to 2 pills 2 times a day to the maximum of 4 pills daily as needed for pain.   Aspirin may be taken up to 2-3 pills every 4 yours as needed for pain.   Take only one type of these at a time and take with food.   Acetaminophen (Tylenol) may be taken up to 4000mg daily which would be 350mg, 2 pills every 4 hours or 500mg (extra strength) 2 pills 4 times a day.  This could be taken with the antiinflammatory medications mentioned above.     (Z90.710) Status post hysterectomy  Comment:   Plan: HYDROcodone-acetaminophen (NORCO) 5-325 MG per          tablet

## 2017-09-15 NOTE — NURSING NOTE
"Chief Complaint   Patient presents with     Pain     rt sided pain last night.       Initial /70  Pulse 80  Temp 99.9  F (37.7  C) (Tympanic)  Resp 14  Ht 5' 4\" (1.626 m)  Wt 120 lb (54.4 kg)  LMP 08/21/2017 (Approximate)  BMI 20.6 kg/m2 Estimated body mass index is 20.6 kg/(m^2) as calculated from the following:    Height as of this encounter: 5' 4\" (1.626 m).    Weight as of this encounter: 120 lb (54.4 kg).  Medication Reconciliation: complete    Health Maintenance that is potentially due pending provider review:  NONE    n/a    Is there anyone who you would like to be able to receive your results? No  If yes have patient fill out AARTI    "

## 2017-09-15 NOTE — MR AVS SNAPSHOT
After Visit Summary   9/15/2017    Lauren Macdonald    MRN: [id number]           Patient Information     Date Of Birth          1975        Visit Information        Provider Department      9/15/2017 2:20 PM [facility]        Today's Diagnoses     Abdominal pain, right lower quadrant    -  1    Status post hysterectomy          Care Instructions    ASSESSMENT:   (R10.31) Abdominal pain, right lower quadrant  (primary encounter diagnosis)  Comment: I do not see any sign of infection at this time.  No urinary infection and blood counts are OK.    Plan: CBC with platelets, *UA reflex to Microscopic         and Culture ([facility] (except        [place] and [place]), Urine Microscopic,         HYDROcodone-acetaminophen (NORCO) 5-325 MG per         tablet          If you run a fever, temp of 100.5 or higher, you should be seen and checked out-at the emergency room so that if you need some xrays or US it can be done there.   If pain worse or other symptoms that concern you, come in to get checked.     OK to take the oxycodone/acetaminophen 5/325mg 1-2 every 6 hours as needed.      If pain persistent next week, contact your surgeon.     Heat is OK.  Activity as tolerated.     Pain medication options:  Ibuprofen 200mg OTC may be taken up to 4 pills 4 times a day to the maximum of 3200mg or 16 pills daily as needed for pain.   Naproxen (Aleve) OTC may be taken up to 2 pills 2 times a day to the maximum of 4 pills daily as needed for pain.   Aspirin may be taken up to 2-3 pills every 4 yours as needed for pain.   Take only one type of these at a time and take with food.   Acetaminophen (Tylenol) may be taken up to 4000mg daily which would be 350mg, 2 pills every 4 hours or 500mg (extra strength) 2 pills 4 times a day.  This could be taken with the antiinflammatory medications mentioned above.     (Z90.710) Status post hysterectomy  Comment:  "  Plan: HYDROcodone-acetaminophen (NORCO) 5-325 MG per         tablet          Follow-ups after your visit        Your next 10 appointments already scheduled     Oct 16, 2017 11:30 AM CDT   SHORT with Sadie Mendes MD   [facility]    [address]  [facility] [address]   [phone]              Who to contact     If you have questions or need follow up information about today's clinic visit or your schedule please contact [facility] directly at [phone].  Normal or non-critical lab and imaging results will be communicated to you by [employer], letter or phone within 4 business days after the clinic has received the results. If you do not hear from us within 7 days, please contact the clinic through [employer] or phone. If you have a critical or abnormal lab result, we will notify you by phone as soon as possible.  Submit refill requests through [employer] or call your pharmacy and they will forward the refill request to us. Please allow 3 business days for your refill to be completed.          Additional Information About Your Visit        MyChart Information     [employer] lets you send messages to your doctor, view your test results, renew your prescriptions, schedule appointments and more. To sign up, go to [email or web address] . Click on \"Log in\" on the left side of the screen, which will take you to the Welcome page. Then click on \"Sign up Now\" on the right side of the page.     You will be asked to enter the access code listed below, as well as some personal information. Please follow the directions to create your username and password.     Your access code is: [email or web address]  Expires: 10/15/2017 12:12 PM     Your access code will  in 90 days. If you need help or a new code, please call your [facility] or [phone].        Care EveryWhere ID     This is your Care EveryWhere ID. This could be used by other organizations to " "access your [facility] medical records  [id number]        Your Vitals Were     Pulse Temperature Respirations Height Last Period BMI (Body Mass Index)    80 99.9  F (37.7  C) (Tympanic) 14 5' 4\" (1.626 m) 08/21/2017 (Approximate) 20.6 kg/m2       Blood Pressure from Last 3 Encounters:   09/15/17 100/70   09/06/17 140/81   08/31/17 110/64    Weight from Last 3 Encounters:   09/15/17 120 lb (54.4 kg)   09/06/17 125 lb (56.7 kg)   08/31/17 125 lb (56.7 kg)              We Performed the Following     *UA reflex to Microscopic and Culture ([place] and [facility] (except [place] and [place])     CBC with platelets     Urine Microscopic          Today's Medication Changes          These changes are accurate as of: 9/15/17  4:02 PM.  If you have any questions, ask your nurse or doctor.               Start taking these medicines.        Dose/Directions    HYDROcodone-acetaminophen 5-325 MG per tablet   Commonly known as:  NORCO   Used for:  Abdominal pain, right lower quadrant, Status post hysterectomy   Started by:  Duke Abad MD        Dose:  1-2 tablet   Take 1-2 tablets by mouth every 6 hours as needed for moderate to severe pain   Quantity:  15 tablet   Refills:  0            Where to get your medicines      Some of these will need a paper prescription and others can be bought over the counter.  Ask your nurse if you have questions.     Bring a paper prescription for each of these medications     HYDROcodone-acetaminophen 5-325 MG per tablet                Primary Care Provider Office Phone # Fax #    Duke Abad [phone] [address] [place] [id number]        Equal Access to Services     [facility]: Hadii deep patricio Soalfredo, waaxda luqadaha, qaybta kaalmada ademeka, marianne falcon. So [facility] [phone].    ATENCIÓN: Si habla español, tiene a earl disposición servicios gratuitos de asistencia lingüística. Llame al [phone].    We " comply with applicable federal civil rights laws and [place] laws. We do not discriminate on the basis of race, color, national origin, age, disability sex, sexual orientation or gender identity.            Thank you!     Thank you for choosing [facility]  for your care. Our goal is always to provide you with excellent care. Hearing back from our patients is one way we can continue to improve our services. Please take a few minutes to complete the written survey that you may receive in the mail after your visit with us. Thank you!             Your Updated Medication List - Protect others around you: Learn how to safely use, store and throw away your medicines at www.disposemymeds.org.          This list is accurate as of: 9/15/17  4:02 PM.  Always use your most recent med list.                   Brand Name Dispense Instructions for use Diagnosis    acetaminophen 325 MG tablet    TYLENOL    100 tablet    Take 1-2 tablets (325-650 mg) by mouth every 4 hours as needed for other (mild pain)    Excessive or frequent menstruation       HYDROcodone-acetaminophen 5-325 MG per tablet    NORCO    15 tablet    Take 1-2 tablets by mouth every 6 hours as needed for moderate to severe pain    Abdominal pain, right lower quadrant, Status post hysterectomy       ibuprofen 600 MG tablet    ADVIL/MOTRIN    30 tablet    Take 1 tablet (600 mg) by mouth every 6 hours as needed for pain (mild)    Excessive or frequent menstruation       NEW MED           ondansetron 4 MG ODT tab    ZOFRAN-ODT    10 tablet    Take 1-2 tablets (4-8 mg) by mouth every 8 hours as needed for nausea Dissolve ON the tongue.    Excessive or frequent menstruation       PROBIOTIC ADVANCED PO      Take 1 capsule by mouth daily

## 2017-09-18 ENCOUNTER — TELEPHONE (OUTPATIENT)
Dept: OBGYN | Facility: CLINIC | Age: 42
End: 2017-09-18

## 2017-09-18 DIAGNOSIS — R87.610 ASCUS WITH POSITIVE HIGH RISK HPV CERVICAL: ICD-10-CM

## 2017-09-18 DIAGNOSIS — R87.810 ASCUS WITH POSITIVE HIGH RISK HPV CERVICAL: ICD-10-CM

## 2017-09-18 NOTE — TELEPHONE ENCOUNTER
Dr. Mendes,    When is next pap due? Pt just had a hysterectomy done. Note below:    Inform patient of benign pathology.  Patient will still need continued pap surveillance.Inform patient of benign pathology.     6/30/17 ASCUS Pap, + HR HPV (not 16/18). Plan colp  9/6/17 Hysterectomy - benign.     Heather Martínez, SHANIQUE  Pap Tracking

## 2017-09-27 ENCOUNTER — APPOINTMENT (OUTPATIENT)
Dept: CT IMAGING | Facility: CLINIC | Age: 42
End: 2017-09-27
Attending: EMERGENCY MEDICINE
Payer: COMMERCIAL

## 2017-09-27 ENCOUNTER — TELEPHONE (OUTPATIENT)
Dept: OBGYN | Facility: CLINIC | Age: 42
End: 2017-09-27

## 2017-09-27 ENCOUNTER — APPOINTMENT (OUTPATIENT)
Dept: GENERAL RADIOLOGY | Facility: CLINIC | Age: 42
End: 2017-09-27
Attending: EMERGENCY MEDICINE
Payer: COMMERCIAL

## 2017-09-27 ENCOUNTER — HOSPITAL ENCOUNTER (EMERGENCY)
Facility: CLINIC | Age: 42
Discharge: HOME | End: 2017-09-27
Attending: EMERGENCY MEDICINE | Admitting: EMERGENCY MEDICINE
Payer: COMMERCIAL

## 2017-09-27 VITALS
RESPIRATION RATE: 16 BRPM | SYSTOLIC BLOOD PRESSURE: 134 MMHG | OXYGEN SATURATION: 98 % | DIASTOLIC BLOOD PRESSURE: 84 MMHG | TEMPERATURE: 98.1 F

## 2017-09-27 DIAGNOSIS — M54.6 ACUTE RIGHT-SIDED THORACIC BACK PAIN: ICD-10-CM

## 2017-09-27 DIAGNOSIS — Z90.710 ACQUIRED ABSENCE OF UTERUS: ICD-10-CM

## 2017-09-27 DIAGNOSIS — R06.02 SHORTNESS OF BREATH: ICD-10-CM

## 2017-09-27 DIAGNOSIS — D71 FUNCTIONAL DISORDERS OF POLYMORPHONUCLEAR NEUTROPHILS (H): ICD-10-CM

## 2017-09-27 LAB
ALBUMIN SERPL-MCNC: 4.2 G/DL (ref 3.4–5)
ALBUMIN UR-MCNC: NEGATIVE MG/DL
ALP SERPL-CCNC: 66 U/L (ref 40–150)
ALT SERPL W P-5'-P-CCNC: 16 U/L (ref 0–50)
ANION GAP SERPL CALCULATED.3IONS-SCNC: 7 MMOL/L (ref 3–14)
APPEARANCE UR: CLEAR
AST SERPL W P-5'-P-CCNC: 8 U/L (ref 0–45)
BASOPHILS # BLD AUTO: 0 10E9/L (ref 0–0.2)
BASOPHILS NFR BLD AUTO: 0.3 %
BILIRUB SERPL-MCNC: 0.5 MG/DL (ref 0.2–1.3)
BILIRUB UR QL STRIP: NEGATIVE
BUN SERPL-MCNC: 8 MG/DL (ref 7–30)
CALCIUM SERPL-MCNC: 9.3 MG/DL (ref 8.5–10.1)
CHLORIDE SERPL-SCNC: 103 MMOL/L (ref 94–109)
CO2 SERPL-SCNC: 28 MMOL/L (ref 20–32)
COLOR UR AUTO: ABNORMAL
CREAT SERPL-MCNC: 0.85 MG/DL (ref 0.52–1.04)
D DIMER PPP FEU-MCNC: 0.4 UG/ML FEU (ref 0–0.5)
DIFFERENTIAL METHOD BLD: NORMAL
EOSINOPHIL # BLD AUTO: 0.2 10E9/L (ref 0–0.7)
EOSINOPHIL NFR BLD AUTO: 2 %
ERYTHROCYTE [DISTWIDTH] IN BLOOD BY AUTOMATED COUNT: 12 % (ref 10–15)
GFR SERPL CREATININE-BSD FRML MDRD: 73 ML/MIN/1.7M2
GLUCOSE SERPL-MCNC: 84 MG/DL (ref 70–99)
GLUCOSE UR STRIP-MCNC: NEGATIVE MG/DL
HCT VFR BLD AUTO: 41.2 % (ref 35–47)
HGB BLD-MCNC: 14 G/DL (ref 11.7–15.7)
HGB UR QL STRIP: NEGATIVE
IMM GRANULOCYTES # BLD: 0 10E9/L (ref 0–0.4)
IMM GRANULOCYTES NFR BLD: 0.2 %
KETONES UR STRIP-MCNC: NEGATIVE MG/DL
LEUKOCYTE ESTERASE UR QL STRIP: NEGATIVE
LYMPHOCYTES # BLD AUTO: 2 10E9/L (ref 0.8–5.3)
LYMPHOCYTES NFR BLD AUTO: 21.6 %
MCH RBC QN AUTO: 31.4 PG (ref 26.5–33)
MCHC RBC AUTO-ENTMCNC: 34 G/DL (ref 31.5–36.5)
MCV RBC AUTO: 92 FL (ref 78–100)
MONOCYTES # BLD AUTO: 0.5 10E9/L (ref 0–1.3)
MONOCYTES NFR BLD AUTO: 5.6 %
NEUTROPHILS # BLD AUTO: 6.7 10E9/L (ref 1.6–8.3)
NEUTROPHILS NFR BLD AUTO: 70.3 %
NITRATE UR QL: NEGATIVE
PH UR STRIP: 5.5 PH (ref 5–7)
PLATELET # BLD AUTO: 383 10E9/L (ref 150–450)
POTASSIUM SERPL-SCNC: 4.1 MMOL/L (ref 3.4–5.3)
PROT SERPL-MCNC: 8.4 G/DL (ref 6.8–8.8)
RBC # BLD AUTO: 4.46 10E12/L (ref 3.8–5.2)
SODIUM SERPL-SCNC: 138 MMOL/L (ref 133–144)
SOURCE: ABNORMAL
SP GR UR STRIP: 1 (ref 1–1.03)
TROPONIN I SERPL-MCNC: <0.015 UG/L (ref 0–0.04)
UROBILINOGEN UR STRIP-MCNC: NORMAL MG/DL (ref 0–2)
WBC # BLD AUTO: 9.5 10E9/L (ref 4–11)

## 2017-09-27 PROCEDURE — 96374 THER/PROPH/DIAG INJ IV PUSH: CPT | Mod: 59 | Performed by: EMERGENCY MEDICINE

## 2017-09-27 PROCEDURE — 84484 ASSAY OF TROPONIN QUANT: CPT | Performed by: EMERGENCY MEDICINE

## 2017-09-27 PROCEDURE — 81003 URINALYSIS AUTO W/O SCOPE: CPT | Performed by: FAMILY MEDICINE

## 2017-09-27 PROCEDURE — 71260 CT THORAX DX C+: CPT

## 2017-09-27 PROCEDURE — 85379 FIBRIN DEGRADATION QUANT: CPT | Performed by: EMERGENCY MEDICINE

## 2017-09-27 PROCEDURE — 80053 COMPREHEN METABOLIC PANEL: CPT | Performed by: EMERGENCY MEDICINE

## 2017-09-27 PROCEDURE — 71020 XR CHEST 2 VW: CPT

## 2017-09-27 PROCEDURE — 85025 COMPLETE CBC W/AUTO DIFF WBC: CPT | Performed by: EMERGENCY MEDICINE

## 2017-09-27 PROCEDURE — 25000128 H RX IP 250 OP 636: Performed by: EMERGENCY MEDICINE

## 2017-09-27 PROCEDURE — 25000125 ZZHC RX 250: Performed by: EMERGENCY MEDICINE

## 2017-09-27 PROCEDURE — 99285 EMERGENCY DEPT VISIT HI MDM: CPT | Mod: 25 | Performed by: EMERGENCY MEDICINE

## 2017-09-27 PROCEDURE — 99284 EMERGENCY DEPT VISIT MOD MDM: CPT | Mod: Z6 | Performed by: EMERGENCY MEDICINE

## 2017-09-27 RX ORDER — KETOROLAC TROMETHAMINE 30 MG/ML
30 INJECTION, SOLUTION INTRAMUSCULAR; INTRAVENOUS ONCE
Status: COMPLETED | OUTPATIENT
Start: 2017-09-27 | End: 2017-09-27

## 2017-09-27 RX ORDER — OXYCODONE AND ACETAMINOPHEN 5; 325 MG/1; MG/1
1-2 TABLET ORAL EVERY 4 HOURS PRN
Qty: 8 TABLET | Refills: 0 | Status: SHIPPED | OUTPATIENT
Start: 2017-09-27 | End: 2017-12-06

## 2017-09-27 RX ORDER — IOPAMIDOL 755 MG/ML
75 INJECTION, SOLUTION INTRAVASCULAR ONCE
Status: COMPLETED | OUTPATIENT
Start: 2017-09-27 | End: 2017-09-27

## 2017-09-27 RX ADMIN — KETOROLAC TROMETHAMINE 30 MG: 30 INJECTION, SOLUTION INTRAMUSCULAR at 15:57

## 2017-09-27 RX ADMIN — SODIUM CHLORIDE 80 ML: 9 INJECTION, SOLUTION INTRAVENOUS at 17:01

## 2017-09-27 RX ADMIN — IOPAMIDOL 75 ML: 755 INJECTION, SOLUTION INTRAVENOUS at 17:00

## 2017-09-27 NOTE — TELEPHONE ENCOUNTER
Pt had surgery 9/6/17-hysterectomy and is now having sharp pains on her right shoulder going into her back and under her breast, could this be a complication?  Should she be seen?    Please call-     Micki Ball  [facility]

## 2017-09-27 NOTE — DISCHARGE INSTRUCTIONS
Return if symptoms worsen or new symptoms develop.  Follow-up with primary care physician next available.  Drink plenty of fluids.  If increased pain, shortness of breath or other symptoms present please return for further evaluation and care.  General Neck and Back Pain    Both neck and back pain are usually caused by injury to the muscles or ligaments of the spine. Sometimes the disks that separate each bone of the spine may cause pain by pressing on a nearby nerve. Back and neck pain may appear after a sudden twisting or bending force (such as in a car accident), or sometimes after a simple awkward movement. In either case, muscle spasm is often present and adds to the pain.  Acute neck and back pain usually gets better in 1 to 2 weeks. Pain related to disk disease, arthritis in the spinal joints or spinal stenosis (narrowing of the spinal canal) can become chronic and last for months or years.  Back and neck pain are common problems. Most people feel better in 1 or 2 weeks, and most of the rest in 1 to 2 months. Most people can remain active.  People experience and describe pain differently.    Pain can be sharp, stabbing, shooting, aching, cramping, or burning    Movement, standing, bending, lifting, sitting, or walking may worsen the pain    Pain can be localized to one spot or area, or it can be more generalized    Pain can spread or radiate upwards, downwards, to the front, or go down your arms    Muscle spasm may occur.  Most of the time mechanical problems with the muscles or spine cause the pain. it is usually caused by an injury, whether known or not, to the muscles or ligaments. While illnesses can cause back pain, it is usually not caused by a serious illness. Pain is usually related to physical activity, whether sports, exercise, work, or normal activity. Sometimes it can occur without an identifiable cause. This can happen simply by stretching or moving wrong, without noting pain at the time. Other  causes include:    Overexertion, lifting, pushing, pulling incorrectly or too aggressively.    Sudden twisting, bending or stretching from an accident (car or fall), or accidental movement.    Poor posture    Poor conditioning, lack of regular exercise    Spinal disc disease or arthritis    Stress    Pregnancy, or illness like appendicitis, bladder or kidney infection, pelvic infections   Home care    For neck pain: Use a comfortable pillow that supports the head and keeps the spine in a neutral position. The position of the head should not be tilted forward or backward.    When in bed, try to find a position of comfort. A firm mattress is best. Try lying flat on your back with pillows under your knees. You can also try lying on your side with your knees bent up towards your chest and a pillow between your knees.    At first, do not try to stretch out the sore spots. If there is a strain, it is not like the good soreness you get after exercising without an injury. In this case, stretching may make it worse.    Avoid prolonged sitting, long car rides or travel. This puts more stress on the lower back than standing or walking.    During the first 24 to 72 hours after an injury, apply an ice pack to the painful area for 20 minutes and then remove it for 20 minutes over a period of 60 to 90 minutes or several times a day.     You can alternate ice and heat therapies. Talk with your healthcare provider about the best treatment for your back or neck pain. As a safety precaution, do not use a heating pad at bedtime. Sleeping with a heating pad can lead to skin burns or tissue damage.    Therapeutic massage can help relax the back and neck muscles without stretching them.    Be aware of safe lifting methods and do not lift anything over 15 pounds until all the pain is gone.  Medications  Talk to your healthcare provider before using medicine, especially if you have other medical problems or are taking other  medicines.    You may use over-the-counter medicine to control pain, unless another pain medicine was prescribed. If you have chronic conditions like diabetes, liver or kidney disease, stomach ulcers,  gastrointestinal bleeding, or are taking blood thinner medicines.    Be careful if you are given pain medicines, narcotics, or medicine for muscle spasm. They can cause drowsiness, and can affect your coordination, reflexes, and judgment. Do not drive or operate heavy machinery.  Follow-up care  Follow up with your healthcare provider, or as advised. Physical therapy or further tests may be needed.  If X-rays were taken, you will be notified of any new findings that may affect your care.  Call 911  Seek emergency medical care if any of the following occur:    Trouble breathing    Confusion    Very drowsy or trouble awakening    Fainting or loss of consciousness    Rapid or very slow heart rate    Loss of bowel or bladder control  When to seek medical advice  Call your healthcare provider right away if any of these occur:    Pain becomes worse or spreads into your arms or legs    Weakness, numbness or pain in one or both arms or legs    Numbness in the groin area    Difficulty walking    Fever of 100.4 F (38 C) or higher, or as directed by your healthcare provider  Date Last Reviewed: 7/1/2016 2000-2017 [employer]. [address] [place] [address], [place] [address]. All rights reserved. This information is not intended as a substitute for professional medical care. Always follow your healthcare professional's instructions.

## 2017-09-27 NOTE — ED AVS SNAPSHOT
[facility] Emergency Department    [address] [address]    Phone:  [phone]    Fax:  [phone]                                       Lauren Macdonald   MRN: [id number]    Department:  [facility] Emergency Department   Date of Visit:  9/27/2017           After Visit Summary Signature Page     I have received my discharge instructions, and my questions have been answered. I have discussed any challenges I see with this plan with the nurse or doctor.    ..........................................................................................................................................  Patient/Patient Representative Signature      ..........................................................................................................................................  Patient Representative Print Name and Relationship to Patient    ..................................................               ................................................  Date                                            Time    ..........................................................................................................................................  Reviewed by Signature/Title    ...................................................              ..............................................  Date                                                            Time

## 2017-09-27 NOTE — ED AVS SNAPSHOT
[facility] Emergency Department    [address] [facility] [address]    Phone:  [phone]    Fax:  [phone]                                       Lauren Macdonald   MRN: [id number]    Department:  [facility] Department   Date of Visit:  9/27/2017           Patient Information     Date Of Birth          1975        Your diagnoses for this visit were:     Acute right-sided thoracic back pain        You were seen by Alo Calderon MD.      Follow-up Information     Follow up with Duke Abad MD.    Specialty:  Family Practice    Why:  As needed    Contact information:    [address] [place] [address]  [phone]          Follow up with [facility].    Specialty:  EMERGENCY MEDICINE    Why:  If symptoms worsen    Contact information:    [address] [place] [id number] [phone]    Additional information:    The medical center is located at   [address] [facility] (between [facility] and   [facility] in [place], [place] miles north   of [place]).        Discharge Instructions         Return if symptoms worsen or new symptoms develop.  Follow-up with primary care physician next available.  Drink plenty of fluids.  If increased pain, shortness of breath or other symptoms present please return for further evaluation and care.  General Neck and Back Pain    Both neck and back pain are usually caused by injury to the muscles or ligaments of the spine. Sometimes the disks that separate each bone of the spine may cause pain by pressing on a nearby nerve. Back and neck pain may appear after a sudden twisting or bending force (such as in a car accident), or sometimes after a simple awkward movement. In either case, muscle spasm is often present and adds to the pain.  Acute neck and back pain usually gets better in 1 to 2 weeks. Pain related to disk disease, arthritis in the spinal joints or spinal stenosis (narrowing of the  spinal canal) can become chronic and last for months or years.  Back and neck pain are common problems. Most people feel better in 1 or 2 weeks, and most of the rest in 1 to 2 months. Most people can remain active.  People experience and describe pain differently.    Pain can be sharp, stabbing, shooting, aching, cramping, or burning    Movement, standing, bending, lifting, sitting, or walking may worsen the pain    Pain can be localized to one spot or area, or it can be more generalized    Pain can spread or radiate upwards, downwards, to the front, or go down your arms    Muscle spasm may occur.  Most of the time mechanical problems with the muscles or spine cause the pain. it is usually caused by an injury, whether known or not, to the muscles or ligaments. While illnesses can cause back pain, it is usually not caused by a serious illness. Pain is usually related to physical activity, whether sports, exercise, work, or normal activity. Sometimes it can occur without an identifiable cause. This can happen simply by stretching or moving wrong, without noting pain at the time. Other causes include:    Overexertion, lifting, pushing, pulling incorrectly or too aggressively.    Sudden twisting, bending or stretching from an accident (car or fall), or accidental movement.    Poor posture    Poor conditioning, lack of regular exercise    Spinal disc disease or arthritis    Stress    Pregnancy, or illness like appendicitis, bladder or kidney infection, pelvic infections   Home care    For neck pain: Use a comfortable pillow that supports the head and keeps the spine in a neutral position. The position of the head should not be tilted forward or backward.    When in bed, try to find a position of comfort. A firm mattress is best. Try lying flat on your back with pillows under your knees. You can also try lying on your side with your knees bent up towards your chest and a pillow between your knees.    At first, do not try  to stretch out the sore spots. If there is a strain, it is not like the good soreness you get after exercising without an injury. In this case, stretching may make it worse.    Avoid prolonged sitting, long car rides or travel. This puts more stress on the lower back than standing or walking.    During the first 24 to 72 hours after an injury, apply an ice pack to the painful area for 20 minutes and then remove it for 20 minutes over a period of 60 to 90 minutes or several times a day.     You can alternate ice and heat therapies. Talk with your healthcare provider about the best treatment for your back or neck pain. As a safety precaution, do not use a heating pad at bedtime. Sleeping with a heating pad can lead to skin burns or tissue damage.    Therapeutic massage can help relax the back and neck muscles without stretching them.    Be aware of safe lifting methods and do not lift anything over 15 pounds until all the pain is gone.  Medications  Talk to your healthcare provider before using medicine, especially if you have other medical problems or are taking other medicines.    You may use over-the-counter medicine to control pain, unless another pain medicine was prescribed. If you have chronic conditions like diabetes, liver or kidney disease, stomach ulcers,  gastrointestinal bleeding, or are taking blood thinner medicines.    Be careful if you are given pain medicines, narcotics, or medicine for muscle spasm. They can cause drowsiness, and can affect your coordination, reflexes, and judgment. Do not drive or operate heavy machinery.  Follow-up care  Follow up with your healthcare provider, or as advised. Physical therapy or further tests may be needed.  If X-rays were taken, you will be notified of any new findings that may affect your care.  Call [phone]  Seek emergency medical care if any of the following occur:    Trouble breathing    Confusion    Very drowsy or trouble awakening    Fainting or loss of  consciousness    Rapid or very slow heart rate    Loss of bowel or bladder control  When to seek medical advice  Call your healthcare provider right away if any of these occur:    Pain becomes worse or spreads into your arms or legs    Weakness, numbness or pain in one or both arms or legs    Numbness in the groin area    Difficulty walking    Fever of 100.4 F (38 C) or higher, or as directed by your healthcare provider  Date Last Reviewed: 7/1/2016 2000-2017 [employer]. [address] [place] [address]. All rights reserved. This information is not intended as a substitute for professional medical care. Always follow your healthcare professional's instructions.          Future Appointments        Provider Department Dept Phone Center    10/16/2017 11:30 AM Sadie Mendes MD [facility] [phone] [facility]      24 Hour Appointment Hotline       To make an appointment at any [facility], call [phone] ([phone]). If you don't have a family doctor or clinic, we will help you find one. [facility] are conveniently located to serve the needs of you and your family.             Review of your medicines      START taking        Dose / Directions Last dose taken    oxyCODONE-acetaminophen 5-325 MG per tablet   Commonly known as:  PERCOCET   Dose:  1-2 tablet   Quantity:  8 tablet        Take 1-2 tablets by mouth every 4 hours as needed for pain   Refills:  0          Our records show that you are taking the medicines listed below. If these are incorrect, please call your family doctor or clinic.        Dose / Directions Last dose taken    acetaminophen 325 MG tablet   Commonly known as:  TYLENOL   Dose:  325-650 mg   Quantity:  100 tablet        Take 1-2 tablets (325-650 mg) by mouth every 4 hours as needed for other (mild pain)   Refills:  0        HYDROcodone-acetaminophen 5-325 MG per tablet   Commonly known as:  NORCO   Dose:  1-2 tablet   Quantity:  15  tablet        Take 1-2 tablets by mouth every 6 hours as needed for moderate to severe pain   Refills:  0        ibuprofen 600 MG tablet   Commonly known as:  ADVIL/MOTRIN   Dose:  600 mg   Quantity:  30 tablet        Take 1 tablet (600 mg) by mouth every 6 hours as needed for pain (mild)   Refills:  0        NEW MED        Refills:  0        ondansetron 4 MG ODT tab   Commonly known as:  ZOFRAN-ODT   Dose:  4-8 mg   Quantity:  10 tablet        Take 1-2 tablets (4-8 mg) by mouth every 8 hours as needed for nausea Dissolve ON the tongue.   Refills:  0        PROBIOTIC ADVANCED PO   Dose:  1 capsule        Take 1 capsule by mouth daily   Refills:  0                Prescriptions were sent or printed at these locations (1 Prescription)                   Other Prescriptions                Printed at Department/Unit printer (1 of 1)         oxyCODONE-acetaminophen (PERCOCET) 5-325 MG per tablet                Procedures and tests performed during your visit     CBC with platelets differential    Chest CT - IV contrast only - PE protocol    Chest XR,  PA & LAT    Comprehensive metabolic panel    D dimer quantitative    Troponin I    UA reflex to Microscopic      Orders Needing Specimen Collection     None      Pending Results     Date and Time Order Name Status Description    9/27/2017 1638 Chest CT - IV contrast only - PE protocol Preliminary             Pending Culture Results     No orders found from 9/25/2017 to 9/28/2017.            Pending Results Instructions     If you had any lab results that were not finalized at the time of your Discharge, you can call the ED Lab Result RN at [phone]. You will be contacted by this team for any positive Lab results or changes in treatment. The nurses are available 7 days a week from 10A to 6:30P.  You can leave a message 24 hours per day and they will return your call.        Test Results From Your Hospital Stay        9/27/2017  1:30 PM      Component Results     Component  Value Ref Range & Units Status    Color Urine Straw  Final    Appearance Urine Clear  Final    Glucose Urine Negative NEG^Negative mg/dL Final    Bilirubin Urine Negative NEG^Negative Final    Ketones Urine Negative NEG^Negative mg/dL Final    Specific Gravity Urine 1.000 (L) 1.003 - 1.035 Final    Blood Urine Negative NEG^Negative Final    pH Urine 5.5 5.0 - 7.0 pH Final    Protein Albumin Urine Negative NEG^Negative mg/dL Final    Urobilinogen mg/dL Normal 0.0 - 2.0 mg/dL Final    Nitrite Urine Negative NEG^Negative Final    Leukocyte Esterase Urine Negative NEG^Negative Final    Source Midstream Urine  Final         9/27/2017  2:11 PM      Component Results     Component Value Ref Range & Units Status    WBC 9.5 4.0 - 11.0 10e9/L Final    RBC Count 4.46 3.8 - 5.2 10e12/L Final    Hemoglobin 14.0 11.7 - 15.7 g/dL Final    Hematocrit 41.2 35.0 - 47.0 % Final    MCV 92 78 - 100 fl Final    MCH 31.4 26.5 - 33.0 pg Final    MCHC 34.0 31.5 - 36.5 g/dL Final    RDW 12.0 10.0 - 15.0 % Final    Platelet Count 383 150 - 450 10e9/L Final    Diff Method Automated Method  Final    % Neutrophils 70.3 % Final    % Lymphocytes 21.6 % Final    % Monocytes 5.6 % Final    % Eosinophils 2.0 % Final    % Basophils 0.3 % Final    % Immature Granulocytes 0.2 % Final    Absolute Neutrophil 6.7 1.6 - 8.3 10e9/L Final    Absolute Lymphocytes 2.0 0.8 - 5.3 10e9/L Final    Absolute Monocytes 0.5 0.0 - 1.3 10e9/L Final    Absolute Eosinophils 0.2 0.0 - 0.7 10e9/L Final    Absolute Basophils 0.0 0.0 - 0.2 10e9/L Final    Abs Immature Granulocytes 0.0 0 - 0.4 10e9/L Final         9/27/2017  2:30 PM      Component Results     Component Value Ref Range & Units Status    Sodium 138 133 - 144 mmol/L Final    Potassium 4.1 3.4 - 5.3 mmol/L Final    Chloride 103 94 - 109 mmol/L Final    Carbon Dioxide 28 20 - 32 mmol/L Final    Anion Gap 7 3 - 14 mmol/L Final    Glucose 84 70 - 99 mg/dL Final    Urea Nitrogen 8 7 - 30 mg/dL Final    Creatinine 0.85  0.52 - 1.04 mg/dL Final    GFR Estimate 73 >60 mL/min/1.7m2 Final    Non  GFR Calc    GFR Estimate If Black 89 >60 mL/min/1.7m2 Final    African American GFR Calc    Calcium 9.3 8.5 - 10.1 mg/dL Final    Bilirubin Total 0.5 0.2 - 1.3 mg/dL Final    Albumin 4.2 3.4 - 5.0 g/dL Final    Protein Total 8.4 6.8 - 8.8 g/dL Final    Alkaline Phosphatase 66 40 - 150 U/L Final    ALT 16 0 - 50 U/L Final    AST 8 0 - 45 U/L Final         9/27/2017  2:30 PM      Component Results     Component Value Ref Range & Units Status    Troponin I ES <0.015 0.000 - 0.045 ug/L Final    The 99th percentile for upper reference range is 0.045 ug/L.  Troponin values   in the range of 0.045 - 0.120 ug/L may be associated with risks of adverse   clinical events.           9/27/2017  2:20 PM      Component Results     Component Value Ref Range & Units Status    D Dimer 0.4 0.0 - 0.50 ug/ml FEU Final    This D-dimer assay is intended for use in conjunction with a clinical pretest   probability assessment model to exclude pulmonary embolism (PE) and deep   venous thrombosis (DVT) in outpatients suspected of PE or DVT. The cut-off   value is 0.5 ug/mL FEU.           9/27/2017  4:27 PM      Narrative     CHEST TWO VIEW 9/27/2017 4:16 PM     HISTORY: Shortness of breath.     COMPARISON: 3/31/2017    FINDINGS: Mild scoliosis again seen.        Impression     IMPRESSION:  No acute pulmonary disease.    MERT KAN MD         9/27/2017  5:15 PM      Narrative     CT CHEST PULMONARY EMBOLISM WITH CONTRAST 9/27/2017 5:04 PM     HISTORY: Shortness of breath, chest pain status post hysterectomy 3  weeks.    CONTRAST DOSE:  75 mL Isovue-370    Radiation dose for this scan was reduced using automated exposure  control, adjustment of the mA and/or kV according to patient size, or  iterative reconstruction technique.    FINDINGS:  There is a good contrast bolus within the pulmonary  arteries. There are no pulmonary arterial filling defects to  "indicate  pulmonary embolism. Contrast enhancement within the aorta is  suboptimal. Nonetheless, there is no evidence of aortic dissection.  The mediastinum and quintin appear within normal limits. There is a  calcified granuloma within the right lower lobe. The lungs otherwise  appear clear. No pleural effusion or pneumothorax. Splenic calcified  granulomas also noted.        Impression     IMPRESSION: No CT evidence of pulmonary embolism. Chronic  granulomatous disease. Lungs otherwise appear clear.                Thank you for choosing [facility]       Thank you for choosing [facility] for your care. Our goal is always to provide you with excellent care. Hearing back from our patients is one way we can continue to improve our services. Please take a few minutes to complete the written survey that you may receive in the mail after you visit with us. Thank you!        [employer] Information     [employer] lets you send messages to your doctor, view your test results, renew your prescriptions, schedule appointments and more. To sign up, go to [email or web address] . Click on \"Log in\" on the left side of the screen, which will take you to the Welcome page. Then click on \"Sign up Now\" on the right side of the page.     You will be asked to enter the access code listed below, as well as some personal information. Please follow the directions to create your username and password.     Your access code is: [email or web address]  Expires: 10/15/2017 12:12 PM     Your access code will  in 90 days. If you need help or a new code, please call your [facility] clinic or [phone].        Care EveryWhere ID     This is your Care EveryWhere ID. This could be used by other organizations to access your [facility] medical records  [id number]        Equal Access to Services     [facility] : Oj Swan, damon benjamin, qamarianne aguilar . So [facility] [phone].    ATENCIÓN: Si " habla shin, tiene a earl disposición servicios gratuitos de asistencia lingüística. Llame al [phone].    We comply with applicable federal civil rights laws and [place] laws. We do not discriminate on the basis of race, color, national origin, age, disability sex, sexual orientation or gender identity.            After Visit Summary       This is your record. Keep this with you and show to your community pharmacist(s) and doctor(s) at your next visit.

## 2017-09-27 NOTE — ED PROVIDER NOTES
"  History     Chief Complaint   Patient presents with     Abdominal Pain     back upper scapular pain and abd jpain, s/p hyst 3 weeks ago     Chest Pain     HPI  Lauren Macdonald is a 42 year old female who presents to emergency department complaining of right-sided lower chest wall pain and scapular pain.  Patient states she had a hysterectomy 3 weeks ago and over the past few days has had increasing discomfort in this region.  It hurts when she takes a deep breath and moves.  She denies any fevers or chills.  Describes it as a sharp pain.  She denies any lower abdominal pain.  Has not had any urinary symptoms.  Patient denies any other symptoms since her surgery.  She currently rates her pain a 3 out of 5 worsened with exertion.  Past Medical History:   Diagnosis Date     Mononucleosis      Postpartum hemorrhage      Reaction, situational     \"depression\" during divorce       No current facility-administered medications on file prior to encounter.   Current Outpatient Prescriptions on File Prior to Encounter:  NEW MED    HYDROcodone-acetaminophen (NORCO) 5-325 MG per tablet Take 1-2 tablets by mouth every 6 hours as needed for moderate to severe pain   acetaminophen (TYLENOL) 325 MG tablet Take 1-2 tablets (325-650 mg) by mouth every 4 hours as needed for other (mild pain)   ibuprofen (ADVIL/MOTRIN) 600 MG tablet Take 1 tablet (600 mg) by mouth every 6 hours as needed for pain (mild)   ondansetron (ZOFRAN-ODT) 4 MG ODT tab Take 1-2 tablets (4-8 mg) by mouth every 8 hours as needed for nausea Dissolve ON the tongue.   Probiotic Product (PROBIOTIC ADVANCED PO) Take 1 capsule by mouth daily      Social History     Social History     Marital status: Single     Spouse name: N/A     Number of children: N/A     Years of education: N/A     Occupational History     Not on file.     Social History Main Topics     Smoking status: Never Smoker     Smokeless tobacco: Never Used     Alcohol use No      Comment: rare     Drug " use: No     Sexual activity: Yes     Partners: Male     Birth control/ protection: Surgical      Comment: tubal ligation 2003     Other Topics Concern     Not on file     Social History Narrative       I have reviewed the Medications, Allergies, Past Medical and Surgical History, and Social History in the Epic system.         Review of Systems   Constitutional: Negative for chills and fever.   HENT: Negative for congestion and trouble swallowing.    Respiratory: Positive for chest tightness and shortness of breath. Negative for cough and wheezing.    Cardiovascular: Positive for chest pain. Negative for palpitations and leg swelling.   Gastrointestinal: Negative for abdominal pain, nausea and vomiting.   Genitourinary: Negative for decreased urine volume and dysuria.   Musculoskeletal: Positive for back pain. Negative for gait problem, myalgias and neck pain.   Skin: Negative for rash.   Neurological: Negative for dizziness, weakness, light-headedness, numbness and headaches.   Hematological: Does not bruise/bleed easily.   Psychiatric/Behavioral: Negative for confusion.       Physical Exam   BP: (!) 132/94  Heart Rate: 87  Temp: 98.1  F (36.7  C)  Resp: 16  SpO2: 98 %  Physical Exam   Constitutional: She appears well-developed and well-nourished. No distress.   HENT:   Head: Normocephalic.   Mouth/Throat: Oropharynx is clear and moist.   Eyes: Conjunctivae are normal.   Neck: Normal range of motion. Neck supple.   Cardiovascular: Normal rate, regular rhythm, normal heart sounds and intact distal pulses.    No murmur heard.  Pulmonary/Chest: Effort normal. She has no wheezes. She has no rales.   Mild tenderness to palpation of there R lower anterior chest and lateral wall. Palpation somewhat reproduces the pain. No erythema or edema is present. Lungs are slightly decreased at bases.   Abdominal: Soft. Bowel sounds are normal. There is no tenderness.   Musculoskeletal: Normal range of motion. She exhibits no edema.    Tenderness to palpation of R scapular region. No erythema or edema.   Neurological: She is alert. She exhibits normal muscle tone.   Skin: Skin is warm and dry. No rash noted.   Psychiatric: She has a normal mood and affect.   Nursing note and vitals reviewed.      ED Course     ED Course     Procedures          Critical Care time:  none               Labs Ordered and Resulted from Time of ED Arrival Up to the Time of Departure from the ED   URINE MACROSCOPIC WITH REFLEX TO MICRO - Abnormal; Notable for the following:        Result Value    Specific Gravity Urine 1.000 (*)     All other components within normal limits   CBC WITH PLATELETS DIFFERENTIAL   COMPREHENSIVE METABOLIC PANEL   TROPONIN I   D DIMER QUANTITATIVE       Assessments & Plan (with Medical Decision Making)records were reviewed. Pain is reproducible. Labs and cxr were obtained. CXR with unremarkable. parient was given toradol for pain. White count is not elevated and there is no L shift. Comprehensive metabolic panel without abnormality. Troponin is within normal limits. And Ddimer in within normal limits. Findings discussed with patient who is still having some sob and R sided chest pain. Due to patient recent surgery I discussed the risk and benefits of a chest CT to further R/O PE. It was decided to obtain this . CT without evidence of pulmonary embolism. Chronic granulomatous disease is present. Findings were discussed with patient. She feels comforable going home at this time.       I have reviewed the nursing notes.    I have reviewed the findings, diagnosis, plan and need for follow up with the patient.       Discharge Medication List as of 9/27/2017  5:42 PM      START taking these medications    Details   oxyCODONE-acetaminophen (PERCOCET) 5-325 MG per tablet Take 1-2 tablets by mouth every 4 hours as needed for pain, Disp-8 tablet, R-0, Local Print             Final diagnoses:   Acute right-sided thoracic back pain       9/27/2017    [facility] EMERGENCY DEPARTMENT     Alo Calderon MD  09/29/17 5315

## 2017-09-27 NOTE — TELEPHONE ENCOUNTER
"Return call to patient.    S-(situation): Patient reports pain in right shoulder blade and underneath right breast that started yesterday. Patient denies fever or sweats, reports chills. Patient reports + nausea. Patient reports \" loose stools last night.\" Patient denies vaginal bleeding. Patient reports yellowish discharge - been since the surgery. Patient reports she has had a cold over the past few weeks, cannot smell to distinguish odor. Patient reports trying Rolaids with GasX- help a little - took away some of the pain. Pain is exacerbated with deep breathing. Patient reports normal urination. Patient denies heartburn. Patient reports feeling other gas pains. Patient feeling short of breath. Patient reports some coughing. Breathing non-labored throughout conversation. Patient reports increased fatigue. Patient is not an asthmatic. Patient does not use inhalers.  Patient does not have sick contacts or known exposure to anything. Patient has been using heat packs.     B-(background): 9/6/17 Procedure: total vaginal hysterectomy     A-(assessment): pain, shortness of breath    R-(recommendations): Recommended further evaluation for shortness of breath with scapula pain.     Patient reports understanding.    Stefania Jones   [facility]  RN    "

## 2017-09-27 NOTE — ED NOTES
Pt presents to the ED with complaints of right flank/chest/abdominal pain since last night. Pt has (never) had pain like this before. Pain is worse (on palpation and worse with movement and worse with coughing). Pt has been nauseated and has NOT vomited. Pt tried rolaids with(out) much relief. Last BM this am, loose. Pt reports pain with urination for 3 weeks now (since her hysterectomy). NO blood in urine or stool. No chance of pregnancy.

## 2017-09-29 ASSESSMENT — ENCOUNTER SYMPTOMS
WHEEZING: 0
NECK PAIN: 0
LIGHT-HEADEDNESS: 0
COUGH: 0
FEVER: 0
MYALGIAS: 0
CHEST TIGHTNESS: 1
NAUSEA: 0
VOMITING: 0
DYSURIA: 0
NUMBNESS: 0
CHILLS: 0
CONFUSION: 0
HEADACHES: 0
PALPITATIONS: 0
ABDOMINAL PAIN: 0
WEAKNESS: 0
DIZZINESS: 0
BACK PAIN: 1
BRUISES/BLEEDS EASILY: 0
SHORTNESS OF BREATH: 1
TROUBLE SWALLOWING: 0

## 2017-10-16 ENCOUNTER — OFFICE VISIT (OUTPATIENT)
Dept: OBGYN | Facility: CLINIC | Age: 42
End: 2017-10-16

## 2017-10-16 ENCOUNTER — TELEPHONE (OUTPATIENT)
Dept: OBGYN | Facility: CLINIC | Age: 42
End: 2017-10-16

## 2017-10-16 VITALS
TEMPERATURE: 98.1 F | SYSTOLIC BLOOD PRESSURE: 121 MMHG | HEART RATE: 76 BPM | WEIGHT: 124.2 LBS | BODY MASS INDEX: 21.21 KG/M2 | HEIGHT: 64 IN | DIASTOLIC BLOOD PRESSURE: 79 MMHG

## 2017-10-16 DIAGNOSIS — Z09 POSTOP CHECK: Primary | ICD-10-CM

## 2017-10-16 PROBLEM — J84.10 GRANULOMATOUS LUNG DISEASE (H): Status: ACTIVE | Noted: 2017-10-16

## 2017-10-16 PROBLEM — N92.0 EXCESSIVE OR FREQUENT MENSTRUATION: Status: RESOLVED | Noted: 2017-07-25 | Resolved: 2017-10-16

## 2017-10-16 PROCEDURE — 99024 POSTOP FOLLOW-UP VISIT: CPT | Performed by: OBSTETRICS & GYNECOLOGY

## 2017-10-16 NOTE — TELEPHONE ENCOUNTER
Letter typed and given to Dr. Mendes to sign.  Pt informed can  later today.    -Tamanna BLEDSOE [facility]  [facility] [place]

## 2017-10-16 NOTE — TELEPHONE ENCOUNTER
"Reason for Call:  Other note for work    Detailed comments: pt requesting note fore work stating she cannot work more than 8 hours a day.  \"I'm not sure I can handle anything past 8 hours for awhile\"  Hysterectomy 6 wks ago.  Was just in for post op today.    Pt will  when ready.    Please advise.      Phone Number Patient can be reached at: Home number on file [phone] (home)    Best Time: any    Can we leave a detailed message on this number? YES    Call taken on 10/16/2017 at 1:28 PM by Tamanna Kraft      "

## 2017-10-16 NOTE — NURSING NOTE
"Chief Complaint   Patient presents with     Surgical Followup     hysterectomy 9/16/17       Initial /79 (BP Location: Right arm, Patient Position: Chair, Cuff Size: Adult Regular)  Pulse 76  Temp 98.1  F (36.7  C) (Oral)  Ht 5' 4\" (1.626 m)  Wt 124 lb 3.2 oz (56.3 kg)  LMP 08/21/2017 (Approximate)  Breastfeeding? No  BMI 21.32 kg/m2 Estimated body mass index is 21.32 kg/(m^2) as calculated from the following:    Height as of this encounter: 5' 4\" (1.626 m).    Weight as of this encounter: 124 lb 3.2 oz (56.3 kg).  Medication Reconciliation: complete   Cindy Friedman CMA      "

## 2017-10-16 NOTE — MR AVS SNAPSHOT
"              After Visit Summary   10/16/2017    Lauren Macdonald    MRN: [id number]           Patient Information     Date Of Birth          1975        Visit Information        Provider Department      10/16/2017 11:30 AM Sadie Mendes MD [facility]        Today's Diagnoses     Postop check    -  1       Follow-ups after your visit        Who to contact     If you have questions or need follow up information about today's clinic visit or your schedule please contact [facility] directly at [phone].  Normal or non-critical lab and imaging results will be communicated to you by MyChart, letter or phone within 4 business days after the clinic has received the results. If you do not hear from us within 7 days, please contact the clinic through Sprinklehart or phone. If you have a critical or abnormal lab result, we will notify you by phone as soon as possible.  Submit refill requests through The Original SoupMan or call your pharmacy and they will forward the refill request to us. Please allow 3 business days for your refill to be completed.          Additional Information About Your Visit        MyChart Information     The Original SoupMan lets you send messages to your doctor, view your test results, renew your prescriptions, schedule appointments and more. To sign up, go to [email or web address] . Click on \"Log in\" on the left side of the screen, which will take you to the Welcome page. Then click on \"Sign up Now\" on the right side of the page.     You will be asked to enter the access code listed below, as well as some personal information. Please follow the directions to create your username and password.     Your access code is: [email or web address]  Expires: 2018 11:55 AM     Your access code will  in 90 days. If you need help or a new code, please call your [facility] or [phone].        Care EveryWhere ID     This is your Care EveryWhere ID. This could be used by other " "organizations to access your [place] medical records  [id number]        Your Vitals Were     Pulse Temperature Height Last Period Breastfeeding? BMI (Body Mass Index)    76 98.1  F (36.7  C) (Oral) 5' 4\" (1.626 m) 08/21/2017 (Approximate) No 21.32 kg/m2       Blood Pressure from Last 3 Encounters:   10/16/17 121/79   09/27/17 134/84   09/15/17 100/70    Weight from Last 3 Encounters:   10/16/17 124 lb 3.2 oz (56.3 kg)   09/15/17 120 lb (54.4 kg)   09/06/17 125 lb (56.7 kg)              Today, you had the following     No orders found for display       Primary Care Provider Office Phone # Fax #    [facility] [phone] [address] [address]        Equal Access to Services     [facility]: Hadii deep williamson hadasho Soalfredo, waaxda luqadaha, qaybta kaalmada adeegyada, waxay raymundo bower . So [facility] [phone].    ATENCIÓN: Si habla español, tiene a earl disposición servicios gratuitos de asistencia lingüística. Jacinta al [phone].    We comply with applicable federal civil rights laws and [place] laws. We do not discriminate on the basis of race, color, national origin, age, disability, sex, sexual orientation, or gender identity.            Thank you!     Thank you for choosing [facility]  for your care. Our goal is always to provide you with excellent care. Hearing back from our patients is one way we can continue to improve our services. Please take a few minutes to complete the written survey that you may receive in the mail after your visit with us. Thank you!             Your Updated Medication List - Protect others around you: Learn how to safely use, store and throw away your medicines at www.disposemymeds.org.          This list is accurate as of: 10/16/17 11:55 AM.  Always use your most recent med list.                   Brand Name Dispense Instructions for use Diagnosis    acetaminophen 325 MG tablet    TYLENOL    100 " tablet    Take 1-2 tablets (325-650 mg) by mouth every 4 hours as needed for other (mild pain)    Excessive or frequent menstruation       HYDROcodone-acetaminophen 5-325 MG per tablet    NORCO    15 tablet    Take 1-2 tablets by mouth every 6 hours as needed for moderate to severe pain    Abdominal pain, right lower quadrant, Status post hysterectomy       ibuprofen 600 MG tablet    ADVIL/MOTRIN    30 tablet    Take 1 tablet (600 mg) by mouth every 6 hours as needed for pain (mild)    Excessive or frequent menstruation       NEW MED           ondansetron 4 MG ODT tab    ZOFRAN-ODT    10 tablet    Take 1-2 tablets (4-8 mg) by mouth every 8 hours as needed for nausea Dissolve ON the tongue.    Excessive or frequent menstruation       oxyCODONE-acetaminophen 5-325 MG per tablet    PERCOCET    8 tablet    Take 1-2 tablets by mouth every 4 hours as needed for pain        PROBIOTIC ADVANCED PO      Take 1 capsule by mouth daily

## 2017-10-16 NOTE — LETTER
[facility]  [address] [address]  Phone: [phone]    2017              To whom it may concern:    RE: Lauren RANGEL Renae  : 1975     Lauren is cleared to resume normal work as of 10/18/17.     Please contact me for questions or concerns.      Sincerely,        Sadie Mendes MD

## 2017-10-16 NOTE — PROGRESS NOTES
"Lauren is a 42 year old  6 weeks s/p total vaginal hysterectomy complicated by followup visits for right sided abdominal and pleuritic chest pains.  Work ups were negative for PE, abscess, or other pathology.  She is currently requiring nothing for pain management.  - vaginal bleeding, - hot flashes.  - GI/ complaints.  Energy level is good.  Denies fever.    The pathology report showed:   Uterus, total vaginal hysterectomy:   - Focal mild parakeratosis and hyperkeratosis of cervix and negative for   koilocytosis, dysplasia or malignancy.   - Benign weakly proliferative endometrium, negative for hyperplasia,   atypia and malignancy.   - A 0.7 cm intrauterine leiomyoma    PE: /79 (BP Location: Right arm, Patient Position: Chair, Cuff Size: Adult Regular)  Pulse 76  Temp 98.1  F (36.7  C) (Oral)  Ht 5' 4\" (1.626 m)  Wt 124 lb 3.2 oz (56.3 kg)  LMP 2017 (Approximate)  Breastfeeding? No  BMI 21.32 kg/m2  Abd: soft, non tender, no masses  NEFG  Vagina: well healed, no lesions  BME: cuff intact    A/P 6 weeks s/p tvh    1. Doing well.  No further restrictions.  Repeat pap testing of cuff in 12 months    Sadie Mendes M.D.   "

## 2017-10-25 ENCOUNTER — TELEPHONE (OUTPATIENT)
Dept: OBGYN | Facility: CLINIC | Age: 42
End: 2017-10-25

## 2017-10-25 NOTE — TELEPHONE ENCOUNTER
Pt had a hysterectomy about 7 weeks ago and yesterday she went and saw a chiropractor and ever since she is having pain when she goes to the bathroom and pain down in that area, should she be seen for this?    Please call-     Micki Cardton  [facility] [place]

## 2017-10-25 NOTE — TELEPHONE ENCOUNTER
"Return call to patient.    S-(situation): Vaginal pain, started after chiropractic adjustment opf her low back and hip area. Patient reports pain is 4/10. Patient reports it feels sharp and shooting. Patient reports it feels constant. Patient is using Ibuprofen and Tylenol with some relief. Patient denies UTI symptoms. Patient reports bladder feels \" a little painful\" when it is filled.    B-(background): 9/6/17 Procedure: Total vaginal hysterectomy, cystoscopy     A-(assessment): vaginal pain    R-(recommendations): Reassurance provided. Continue to monitor. Continue with Ibuprofen and Tylenol, other comfort measures such as heat or warm tub bath. Check with chiropractor - due to be adjusted again on Friday. May have irritated some nerves in the pelvic floor.     Patient reports understanding and in agreement.  Patient will call back if worsens or fails to improve.     Stefania Jones   Ob[facility]yn Clinic  RN      "

## 2017-11-09 ENCOUNTER — OFFICE VISIT (OUTPATIENT)
Dept: OBGYN | Facility: CLINIC | Age: 42
End: 2017-11-09
Payer: COMMERCIAL

## 2017-11-09 ENCOUNTER — TELEPHONE (OUTPATIENT)
Dept: OBGYN | Facility: CLINIC | Age: 42
End: 2017-11-09

## 2017-11-09 VITALS
BODY MASS INDEX: 21.42 KG/M2 | WEIGHT: 124.8 LBS | DIASTOLIC BLOOD PRESSURE: 80 MMHG | TEMPERATURE: 97.7 F | HEART RATE: 100 BPM | SYSTOLIC BLOOD PRESSURE: 119 MMHG | RESPIRATION RATE: 16 BRPM

## 2017-11-09 DIAGNOSIS — R30.0 DYSURIA: Primary | ICD-10-CM

## 2017-11-09 DIAGNOSIS — R10.2 VAGINAL PAIN: ICD-10-CM

## 2017-11-09 LAB
SPECIMEN SOURCE: ABNORMAL
WET PREP SPEC: ABNORMAL

## 2017-11-09 PROCEDURE — 99024 POSTOP FOLLOW-UP VISIT: CPT | Performed by: OBSTETRICS & GYNECOLOGY

## 2017-11-09 PROCEDURE — 87210 SMEAR WET MOUNT SALINE/INK: CPT | Performed by: OBSTETRICS & GYNECOLOGY

## 2017-11-09 RX ORDER — NITROFURANTOIN 25; 75 MG/1; MG/1
100 CAPSULE ORAL 2 TIMES DAILY
Qty: 14 CAPSULE | Refills: 0 | Status: SHIPPED | OUTPATIENT
Start: 2017-11-09 | End: 2017-12-06

## 2017-11-09 NOTE — TELEPHONE ENCOUNTER
"Reason for call:  Patient reporting a symptom    Symptom or request: all day yesterday was having a lot of cramping and \"feels like something is pulling\"  9 weeks post hysterectomy.  Before she urinates she has \"Bladder spasms\" and sometimes when she goes she can't go at all.    Duration (how long have symptoms been present):     Have you been treated for this before? No    Additional comments:     Phone Number patient can be reached at:  Home number on file [phone] (home)    Best Time:  any    Can we leave a detailed message on this number:  YES    Call taken on 11/9/2017 at 8:30 AM by Tamanna Kraft    "

## 2017-11-09 NOTE — PROGRESS NOTES
Patient presents approximately 9 weeks status post vaginal hysterectomy, uncomplicated with concerns about some discomfort across her abdomen just below her belly button with some radiation into the back. She's noticed a feeling of bladder spasm where she has a little bit of discomfort with urination a little bit of increased frequency and also a feeling of incomplete emptying and sometimes hesitancy. She denies gross hematuria. She denies abnormal vaginal discharge, odor or vulvovaginal irritation. She denies fevers, myalgias, rigors, emesis, nausea or anorexia. The discomfort started about 12-24 hours ago but has not gotten significantly worse. He has return to intercourse and admits to some deep thrusting dyspareunia, but she states this is always been the case for her and this is not new.    Physical examination reveals a very pleasant alert and oriented ×3 female, nontoxic appearing in no apparent distress.  Abdomen is flat, nontender with no mass effect. No peritoneal signs. Normal active bowel sounds.  Extremities are benign  Pelvic exam reveals normal external genitals. Introitus and vagina is of normal size and caliber. Vaginal cuff is well-healed. There is some homogenous watery yellow discharge but no odor. Wet prep is obtained. Bimanual reveals minimal tenderness of the vaginal cuff with no evidence of induration, fluctuance or pelvic mass. Rectovaginal examination is confirmatory and there are no rectovaginal septal masses noted and no cul-de-sac nodularity.    Assessment/plan:  Postoperative state, with some increased urinary symptoms. Obtain vaginal cultures to rule out infection and obtain urinalysis. Because her symptoms fit uncomplicated UTI so well, we will send in some Macrobid to her pharmacy so she can get a dose on board today. Often, postoperatively, there is a lot of urine contamination and we may have to ultimately wait till culture results to no overtly whether were dealing with an  infection because the microscopic urinalysis is often hard to decipher. This is the reason were prescribing an empiric antibiotic at this time. Return other results of her cultures ultimately and treat as appropriate. I reviewed with her that we can only assess her at one moment in time. At this time she is nontoxic and I am not concerned about any overt postoperative sepsis or injury. If anything changes, particularly if she ends up having any more serious signs of infection like high fevers, progressive abdominopelvic pain, emesis or anorexia, she needs to present for further evaluation and urgent care.

## 2017-11-09 NOTE — MR AVS SNAPSHOT
"              After Visit Summary   2017    Lauren Macdonald    MRN: [id number]           Patient Information     Date Of Birth          1975        Visit Information        Provider Department      2017 1:30 PM Jacques Lujan, DO [facility]        Today's Diagnoses     Dysuria    -  1    Vaginal pain           Follow-ups after your visit        Who to contact     If you have questions or need follow up information about today's clinic visit or your schedule please contact [facility] directly at [phone].  Normal or non-critical lab and imaging results will be communicated to you by LE TOTEhart, letter or phone within 4 business days after the clinic has received the results. If you do not hear from us within 7 days, please contact the clinic through PartyLinet or phone. If you have a critical or abnormal lab result, we will notify you by phone as soon as possible.  Submit refill requests through iPling or call your pharmacy and they will forward the refill request to us. Please allow 3 business days for your refill to be completed.          Additional Information About Your Visit        MyChart Information     iPling lets you send messages to your doctor, view your test results, renew your prescriptions, schedule appointments and more. To sign up, go to [email or web address] . Click on \"Log in\" on the left side of the screen, which will take you to the Welcome page. Then click on \"Sign up Now\" on the right side of the page.     You will be asked to enter the access code listed below, as well as some personal information. Please follow the directions to create your username and password.     Your access code is: [email or web address]  Expires: 2018 10:55 AM     Your access code will  in 90 days. If you need help or a new code, please call your [facility] or [phone].        Care EveryWhere ID     This is your Care EveryWhere ID. This could be used by " other organizations to access your [place] medical records  [id number]        Your Vitals Were     Pulse Temperature Respirations Last Period BMI (Body Mass Index)       100 97.7  F (36.5  C) (Tympanic) 16 08/21/2017 (Approximate) 21.42 kg/m2        Blood Pressure from Last 3 Encounters:   11/09/17 119/80   10/16/17 121/79   09/27/17 134/84    Weight from Last 3 Encounters:   11/09/17 124 lb 12.8 oz (56.6 kg)   10/16/17 124 lb 3.2 oz (56.3 kg)   09/15/17 120 lb (54.4 kg)              We Performed the Following     Wet prep          Today's Medication Changes          These changes are accurate as of: 11/9/17  1:47 PM.  If you have any questions, ask your nurse or doctor.               Start taking these medicines.        Dose/Directions    nitroFURantoin (macrocrystal-monohydrate) 100 MG capsule   Commonly known as:  MACROBID   Used for:  Dysuria   Started by:  Jacques Lujan DO        Dose:  100 mg   Take 1 capsule (100 mg) by mouth 2 times daily   Quantity:  14 capsule   Refills:  0            Where to get your medicines      These medications were sent to [facility] #[facility] [address]    Hours:  Closed 10-16-08 business to [facility] Phone:  [phone]     nitroFURantoin (macrocrystal-monohydrate) 100 MG capsule                Primary Care Provider Office Phone # Fax #    [facility] [phone] [address] [facility] [address]        Equal Access to Services     [facility]: Oj Swan, damon benjamin, marianne douglas. So [facility] [phone].    ATENCIÓN: Si habla español, tiene a earl disposición servicios gratuitos de asistencia lingüística. Llame al [phone].    We comply with applicable federal civil rights laws and [place] laws. We do not discriminate on the basis of race, color, national origin, age, disability,  sex, sexual orientation, or gender identity.            Thank you!     Thank you for choosing [facility]  for your care. Our goal is always to provide you with excellent care. Hearing back from our patients is one way we can continue to improve our services. Please take a few minutes to complete the written survey that you may receive in the mail after your visit with us. Thank you!             Your Updated Medication List - Protect others around you: Learn how to safely use, store and throw away your medicines at www.disposemymeds.org.          This list is accurate as of: 11/9/17  1:47 PM.  Always use your most recent med list.                   Brand Name Dispense Instructions for use Diagnosis    acetaminophen 325 MG tablet    TYLENOL    100 tablet    Take 1-2 tablets (325-650 mg) by mouth every 4 hours as needed for other (mild pain)    Excessive or frequent menstruation       HYDROcodone-acetaminophen 5-325 MG per tablet    NORCO    15 tablet    Take 1-2 tablets by mouth every 6 hours as needed for moderate to severe pain    Abdominal pain, right lower quadrant, Status post hysterectomy       ibuprofen 600 MG tablet    ADVIL/MOTRIN    30 tablet    Take 1 tablet (600 mg) by mouth every 6 hours as needed for pain (mild)    Excessive or frequent menstruation       NEW MED           nitroFURantoin (macrocrystal-monohydrate) 100 MG capsule    MACROBID    14 capsule    Take 1 capsule (100 mg) by mouth 2 times daily    Dysuria       ondansetron 4 MG ODT tab    ZOFRAN-ODT    10 tablet    Take 1-2 tablets (4-8 mg) by mouth every 8 hours as needed for nausea Dissolve ON the tongue.    Excessive or frequent menstruation       oxyCODONE-acetaminophen 5-325 MG per tablet    PERCOCET    8 tablet    Take 1-2 tablets by mouth every 4 hours as needed for pain        PROBIOTIC ADVANCED PO      Take 1 capsule by mouth daily

## 2017-11-09 NOTE — TELEPHONE ENCOUNTER
Return call to patient.    Patient reports since yesterday worsening bladder spasms and difficulty with urination.  Patient reports sometimes she feels like she needs to go and nothing comes out.  Other times she is going frequently.    Patient is status post hysterectomy 9 weeks.    Recommended office visit for further evaluation.  Rule out UTI.    Patient agreeable and scheduled for office visit today.    Stefania Jones   [facility]yn Clinic  RN

## 2017-11-09 NOTE — NURSING NOTE
"Chief Complaint   Patient presents with     UTI     Has been getting flank pain and a pulling sensation with sharp pains around the belly button, did have a hysterectomy about 9 weeks ago with no complications.       Initial /80  Pulse 100  Temp 97.7  F (36.5  C) (Tympanic)  Resp 16  Wt 124 lb 12.8 oz (56.6 kg)  LMP 08/21/2017 (Approximate)  BMI 21.42 kg/m2 Estimated body mass index is 21.42 kg/(m^2) as calculated from the following:    Height as of 10/16/17: 5' 4\" (1.626 m).    Weight as of this encounter: 124 lb 12.8 oz (56.6 kg).  Medication Reconciliation: complete    "

## 2017-11-10 RX ORDER — FLUCONAZOLE 150 MG/1
TABLET ORAL
Qty: 2 TABLET | Refills: 0 | Status: SHIPPED | OUTPATIENT
Start: 2017-11-10 | End: 2017-12-06

## 2017-11-13 ENCOUNTER — TELEPHONE (OUTPATIENT)
Dept: OBGYN | Facility: CLINIC | Age: 42
End: 2017-11-13

## 2017-11-13 DIAGNOSIS — R30.0 DYSURIA: Primary | ICD-10-CM

## 2017-11-13 RX ORDER — CIPROFLOXACIN 500 MG/1
500 TABLET, FILM COATED ORAL 2 TIMES DAILY
Qty: 6 TABLET | Refills: 0 | Status: SHIPPED | OUTPATIENT
Start: 2017-11-13 | End: 2017-11-16

## 2017-11-13 NOTE — TELEPHONE ENCOUNTER
Reason for Call:  Other note     Detailed comments: requesting a new note stating she cannot work more than 8 hours.  (states her job has a lot of heavy lifting and she can't handle more than 8 hours in a day)  Hysterectomy 9-6-17.  If approved pt will pick it up when ready.    Also states she was put on an antibiotic - wondering if this is the only medication she is supposed to be taking - doesn't seem to be helping but hasn't taken the second dose yet.    Phone Number Patient can be reached at: Home number on file [phone] (home)    Best Time: any    Can we leave a detailed message on this number? YES    Call taken on 11/13/2017 at 8:18 AM by Tamanna Kraft

## 2017-11-13 NOTE — TELEPHONE ENCOUNTER
"Patient returning call to clinic.    Patient saw Dr. Lujan on 11/9/17 for bladder spasms with dysuria.  Patient given prescription for Macrobid- did not fill as she is afraid she will not be able to tolerate it .  Patient reports she has \" a really sensitive stomach and I researched this med and do not think I want to try it due to the possible side effects.\"  Patient has used Clindamycin or PCN in the past with success. Patient cannot tolerate Omnicef.  Patient has not ever been on Macrobid before that she can remember but is requesting something different to treat UTI.  OFFICE VISIT notes state a urinalysis was obtained but lab does not have record of receiving.    Patient requesting a letter for work restricting her to 8 hour shifts and not 12.  Hysterectomy 9/6/17  Post op 10/16/17 appointment - after assessment patient off all restrictions.    Patient concerned with very heavy lifting that she does and only being able to tolerate 8 hours at a time.    Please review and advise.  Thank you.    Stefania Jones   [facility]yn Clinic  RN        "

## 2017-11-13 NOTE — TELEPHONE ENCOUNTER
Left message that rx that she had requested was sent to her pharmacy.    Gretta Peralta  [facility] RN

## 2017-11-13 NOTE — TELEPHONE ENCOUNTER
I would normally treat with either macrobid or cipro.  Without sensitivities, it will be hard to know if PCN is adequate.      I cannot provide a work note since she is cleared to work full time.      Sadie Mendes M.D.

## 2017-11-13 NOTE — TELEPHONE ENCOUNTER
Lauren returns call and chooses the treatment of Cipro rather than the Macrobid.  She was also advised that Dr. Mendes would not write letter as she had requested.      Gretta Peralta  [facility] RN

## 2017-11-13 NOTE — TELEPHONE ENCOUNTER
Call to pt to notify of below.  Unable to reach.  Left message for pt to call back     Stefania Jones   Ob[facility]Gyn Clinic  RN

## 2017-12-06 ENCOUNTER — OFFICE VISIT (OUTPATIENT)
Dept: FAMILY MEDICINE | Facility: CLINIC | Age: 42
End: 2017-12-06
Payer: COMMERCIAL

## 2017-12-06 VITALS
BODY MASS INDEX: 20.98 KG/M2 | DIASTOLIC BLOOD PRESSURE: 80 MMHG | WEIGHT: 122.2 LBS | SYSTOLIC BLOOD PRESSURE: 118 MMHG | HEART RATE: 80 BPM | RESPIRATION RATE: 16 BRPM | TEMPERATURE: 97.9 F

## 2017-12-06 DIAGNOSIS — G44.209 TENSION HEADACHE: Primary | ICD-10-CM

## 2017-12-06 PROCEDURE — 99213 OFFICE O/P EST LOW 20 MIN: CPT | Performed by: NURSE PRACTITIONER

## 2017-12-06 RX ORDER — CYCLOBENZAPRINE HCL 10 MG
5-10 TABLET ORAL 3 TIMES DAILY PRN
Qty: 30 TABLET | Refills: 1 | Status: SHIPPED | OUTPATIENT
Start: 2017-12-06 | End: 2019-06-17

## 2017-12-06 ASSESSMENT — PAIN SCALES - GENERAL: PAINLEVEL: MODERATE PAIN (5)

## 2017-12-06 NOTE — MR AVS SNAPSHOT
After Visit Summary   12/6/2017    Lauren Macdonald    MRN: [id number]           Patient Information     Date Of Birth          1975        Visit Information        Provider Department      12/6/2017 1:00 PM Lorrie Demarco APRN [facility]        Today's Diagnoses     Tension headache    -  1      Care Instructions    Start Flexeril muscle relaxant as needed - watch while driving or operating machinery    Okay to do Ibuprofen as needed for inflammation     Ice trapezius and neck and move neck through range of motion at least two times daily     Would encourage to continue seeing chiropractor     Drink plenty of fluids, especially on chiropractor days    Try sitting in an epsom salt bath     Do stretches such as the leaning arm stretch in chair     If not improving in a couple of weeks return to clinic to see me for re-evaluation - may consider physical therapy       Head Tilt / Upper Trapezius Stretch (Flexibility)    1. Sit up straight in a chair with your head and neck in a neutral position, ears in line with shoulders. Hold the edge of your chair seat with your right hand. Tuck your chin in slightly.  2. Tilt your head to the left, while looking straight ahead.  3. Put your left hand on the right side of your head. Gently pull your head to the left. Hold for 30 to 60 seconds. Use gentle pressure to increase the stretch. Don t force your head into position.  4. Return your head and neck to the neutral position.  5. Repeat this exercise 2 times, or as instructed.  6. Switch sides and repeat 2 times, or as instructed.  Challenge yourself  Tuck one end of a towel under your left arm. Then bring the other end over your right shoulder. Pull the towel down on your right shoulder with both hands as you side-bend your head to the left. Repeat with the other side.   Date Last Reviewed: 3/10/2016    1706-0034 [employer]. [address] [facility], [place], [place]  "[address]. All rights reserved. This information is not intended as a substitute for professional medical care. Always follow your healthcare professional's instructions.        Shoulder and Upper Back Stretch  To start, stand tall with your ears, shoulders, and hips in line. Your feet should be slightly apart, positioned just under your hips. Focus your eyes directly in front of you.  this position for a few seconds before starting your exercise. This helps increase your awareness of proper posture.          Reach overhead and slightly back with both arms. Keep your shoulders and neck aligned and your elbows behind your shoulders:    With your palms facing the ceiling, turn your fingers inward.    Take a deep breath. Breathe out, and lower your elbows toward your buttocks. Hold for 5 seconds, then return to starting position.    Repeat 3 times.  Date Last Reviewed: 8/16/2015 2000-2017 [employer]. [address] [place] [address], [place] [address]. All rights reserved. This information is not intended as a substitute for professional medical care. Always follow your healthcare professional's instructions.        * Tension Headache    Muscle Tension Headache (also called \"stress headache\") is a very common cause of head pain. Under stress, some people tense the muscles of their shoulder, neck and scalp without knowing it. If this lasts long enough, a headache can occur. These headaches can be very painful and last for hours or even days.  Home Care:    If you were given pain medicine for this headache, do not drive yourself home. Arrange for a ride, instead. When you get home, try to sleep. You should feel much better when you wake up.    Heat to the back of your neck may relieve neck spasm.    Drink only clear liquids or eat a very light diet to avoid nausea/vomiting until symptoms improve.  Preventing Future Headaches    Identify the sources of stress in your life. These may not be obvious! Learn new " ways to handle your stress, such as regular exercise, biofeedback, self-hypnosis and meditation. For more information about this, consult your doctor or go to a local bookstore and review the many books and tapes on this subject.    At the first sign of a tension headache, take time out if possible. Remove yourself from the stressful situation, find a quiet comfortable place to sit or lie down and let yourself relax. Heat and deep massage of the tight areas in the neck and shoulders may help reduce muscle spasm. Medicine, such as ibuprofen (Advil or Motrin) or a prescribed muscle relaxant may be helpful at this point.  Follow Up with your doctor if the headache is not better within the next 24 hours. If you have frequent headaches you should discuss a treatment plan with your primary care doctor. Ask if you can have medicine to take at home the next time you get a bad headache. This may avoid the need for a visit to the emergency department in the future. Poorly controlled chronic headaches may require a referral to a neurologist (headache specialist).  Call your Doctor Or Get Prompt Medical Attention if any of the following occur:    Worsening of your head pain or no improvement within 24 hours    Repeated vomiting (unable to keep liquids down)    Fever of 100.4 F (38.0 C) or higher, or as directed by your healthcare provider    Stiff neck    Extreme drowsiness, confusion or fainting    Dizziness, vertigo (dizziness with spinning sensation)    Weakness of an arm or leg or one side of the face    Difficulty with speech or vision    9706-6578 [employer]. [address] [place] [address], [place] [address]. All rights reserved. This information is not intended as a substitute for professional medical care. Always follow your healthcare professional's instructions.  This information has been modified by your health care provider with permission from the publisher.                Follow-ups after your visit       "  Your next 10 appointments already scheduled     Dec 08, 2017  1:40 PM CST   [facility] Visit with Duke Abad MD   [facility]    [address] [place] [id number]   [phone]           Bring a current list of meds and any records pertaining to this visit. For Physicals, please bring immunization records and any forms needing to be filled out. Please arrive 10 minutes early to complete paperwork.              Who to contact     If you have questions or need follow up information about today's clinic visit or your schedule please contact [facility] directly at [phone].  Normal or non-critical lab and imaging results will be communicated to you by MyChart, letter or phone within 4 business days after the clinic has received the results. If you do not hear from us within 7 days, please contact the clinic through [employer] or phone. If you have a critical or abnormal lab result, we will notify you by phone as soon as possible.  Submit refill requests through [employer] or call your pharmacy and they will forward the refill request to us. Please allow 3 business days for your refill to be completed.          Additional Information About Your Visit        [employer] Information     [employer] lets you send messages to your doctor, view your test results, renew your prescriptions, schedule appointments and more. To sign up, go to [email or web address] . Click on \"Log in\" on the left side of the screen, which will take you to the Welcome page. Then click on \"Sign up Now\" on the right side of the page.     You will be asked to enter the access code listed below, as well as some personal information. Please follow the directions to create your username and password.     Your access code is: [email or web address]  Expires: 2018 10:55 AM     Your access code will  in 90 days. If you need help or a new code, please call your [facility] or " [phone].        Care EveryWhere ID     This is your Care EveryWhere ID. This could be used by other organizations to access your [facility] medical records  [id number]        Your Vitals Were     Pulse Temperature Respirations Last Period BMI (Body Mass Index)       80 97.9  F (36.6  C) (Tympanic) 16 08/21/2017 (Approximate) 20.98 kg/m2        Blood Pressure from Last 3 Encounters:   12/06/17 118/80   11/09/17 119/80   10/16/17 121/79    Weight from Last 3 Encounters:   12/06/17 122 lb 3.2 oz (55.4 kg)   11/09/17 124 lb 12.8 oz (56.6 kg)   10/16/17 124 lb 3.2 oz (56.3 kg)              Today, you had the following     No orders found for display         Today's Medication Changes          These changes are accurate as of: 12/6/17  1:31 PM.  If you have any questions, ask your nurse or doctor.               Start taking these medicines.        Dose/Directions    cyclobenzaprine 10 MG tablet   Commonly known as:  FLEXERIL   Used for:  Tension headache   Started by:  Lorrie Demarco APRN CNP        Dose:  5-10 mg   Take 0.5-1 tablets (5-10 mg) by mouth 3 times daily as needed for muscle spasms   Quantity:  30 tablet   Refills:  1            Where to get your medicines      These medications were sent to [id number] [address] [address]    Hours:  Closed 10-16-08 business to [facility] Phone:  [phone]     cyclobenzaprine 10 MG tablet                Primary Care Provider Office Phone # Fax #    Duke Abad [phone] [address] [facility] [address]        Equal Access to Services     MADDIE CHANEY AH: Oj Swan, damon benjamin, marianne douglas. So [facility] [phone].    ATENCIÓN: Si habla español, tiene a earl disposición servicios gratuitos de asistencia lingüística. Jacinta al [phone].    We comply with applicable federal civil  rights laws and [place] laws. We do not discriminate on the basis of race, color, national origin, age, disability, sex, sexual orientation, or gender identity.            Thank you!     Thank you for choosing [facility]  for your care. Our goal is always to provide you with excellent care. Hearing back from our patients is one way we can continue to improve our services. Please take a few minutes to complete the written survey that you may receive in the mail after your visit with us. Thank you!             Your Updated Medication List - Protect others around you: Learn how to safely use, store and throw away your medicines at www.disposemymeds.org.          This list is accurate as of: 12/6/17  1:31 PM.  Always use your most recent med list.                   Brand Name Dispense Instructions for use Diagnosis    acetaminophen 325 MG tablet    TYLENOL    100 tablet    Take 1-2 tablets (325-650 mg) by mouth every 4 hours as needed for other (mild pain)    Excessive or frequent menstruation       cyclobenzaprine 10 MG tablet    FLEXERIL    30 tablet    Take 0.5-1 tablets (5-10 mg) by mouth 3 times daily as needed for muscle spasms    Tension headache       PROBIOTIC ADVANCED PO      Take 1 capsule by mouth daily

## 2017-12-06 NOTE — PATIENT INSTRUCTIONS
Start Flexeril muscle relaxant as needed - watch while driving or operating machinery    Okay to do Ibuprofen as needed for inflammation     Ice trapezius and neck and move neck through range of motion at least two times daily     Would encourage to continue seeing chiropractor     Drink plenty of fluids, especially on chiropractor days    Try sitting in an epsom salt bath     Do stretches such as the leaning arm stretch in chair     If not improving in a couple of weeks return to clinic to see me for re-evaluation - may consider physical therapy       Head Tilt / Upper Trapezius Stretch (Flexibility)    1. Sit up straight in a chair with your head and neck in a neutral position, ears in line with shoulders. Hold the edge of your chair seat with your right hand. Tuck your chin in slightly.  2. Tilt your head to the left, while looking straight ahead.  3. Put your left hand on the right side of your head. Gently pull your head to the left. Hold for 30 to 60 seconds. Use gentle pressure to increase the stretch. Don t force your head into position.  4. Return your head and neck to the neutral position.  5. Repeat this exercise 2 times, or as instructed.  6. Switch sides and repeat 2 times, or as instructed.  Challenge yourself  Tuck one end of a towel under your left arm. Then bring the other end over your right shoulder. Pull the towel down on your right shoulder with both hands as you side-bend your head to the left. Repeat with the other side.   Date Last Reviewed: 3/10/2016    7184-4253 [employer]. [address] [place] [address], [place] [address]. All rights reserved. This information is not intended as a substitute for professional medical care. Always follow your healthcare professional's instructions.        Shoulder and Upper Back Stretch  To start, stand tall with your ears, shoulders, and hips in line. Your feet should be slightly apart, positioned just under your hips. Focus your eyes directly in  "front of you.  this position for a few seconds before starting your exercise. This helps increase your awareness of proper posture.          Reach overhead and slightly back with both arms. Keep your shoulders and neck aligned and your elbows behind your shoulders:    With your palms facing the ceiling, turn your fingers inward.    Take a deep breath. Breathe out, and lower your elbows toward your buttocks. Hold for 5 seconds, then return to starting position.    Repeat 3 times.  Date Last Reviewed: 8/16/2015 2000-2017 [employer]. [address] [place] [address]. All rights reserved. This information is not intended as a substitute for professional medical care. Always follow your healthcare professional's instructions.        * Tension Headache    Muscle Tension Headache (also called \"stress headache\") is a very common cause of head pain. Under stress, some people tense the muscles of their shoulder, neck and scalp without knowing it. If this lasts long enough, a headache can occur. These headaches can be very painful and last for hours or even days.  Home Care:    If you were given pain medicine for this headache, do not drive yourself home. Arrange for a ride, instead. When you get home, try to sleep. You should feel much better when you wake up.    Heat to the back of your neck may relieve neck spasm.    Drink only clear liquids or eat a very light diet to avoid nausea/vomiting until symptoms improve.  Preventing Future Headaches    Identify the sources of stress in your life. These may not be obvious! Learn new ways to handle your stress, such as regular exercise, biofeedback, self-hypnosis and meditation. For more information about this, consult your doctor or go to a local bookstore and review the many books and tapes on this subject.    At the first sign of a tension headache, take time out if possible. Remove yourself from the stressful situation, find a quiet comfortable " place to sit or lie down and let yourself relax. Heat and deep massage of the tight areas in the neck and shoulders may help reduce muscle spasm. Medicine, such as ibuprofen (Advil or Motrin) or a prescribed muscle relaxant may be helpful at this point.  Follow Up with your doctor if the headache is not better within the next 24 hours. If you have frequent headaches you should discuss a treatment plan with your primary care doctor. Ask if you can have medicine to take at home the next time you get a bad headache. This may avoid the need for a visit to the emergency department in the future. Poorly controlled chronic headaches may require a referral to a neurologist (headache specialist).  Call your Doctor Or Get Prompt Medical Attention if any of the following occur:    Worsening of your head pain or no improvement within 24 hours    Repeated vomiting (unable to keep liquids down)    Fever of 100.4 F (38.0 C) or higher, or as directed by your healthcare provider    Stiff neck    Extreme drowsiness, confusion or fainting    Dizziness, vertigo (dizziness with spinning sensation)    Weakness of an arm or leg or one side of the face    Difficulty with speech or vision    0446-1757 [employer]. [address] [place] [address], [place] [address]. All rights reserved. This information is not intended as a substitute for professional medical care. Always follow your healthcare professional's instructions.  This information has been modified by your health care provider with permission from the publisher.

## 2017-12-06 NOTE — NURSING NOTE
"Chief Complaint   Patient presents with     Headache     Vaginal Problem     Pain       Initial /80 (BP Location: Right arm, Patient Position: Chair, Cuff Size: Adult Regular)  Pulse 80  Temp 97.9  F (36.6  C) (Tympanic)  Resp 16  Wt 122 lb 3.2 oz (55.4 kg)  LMP 08/21/2017 (Approximate)  BMI 20.98 kg/m2 Estimated body mass index is 20.98 kg/(m^2) as calculated from the following:    Height as of 10/16/17: 5' 4\" (1.626 m).    Weight as of this encounter: 122 lb 3.2 oz (55.4 kg).  Medication Reconciliation: complete    Health Maintenance that is potentially due pending provider review:  NONE    Gem Loomis MA  1:13 PM 12/6/2017  .      "

## 2017-12-06 NOTE — PROGRESS NOTES
SUBJECTIVE:   Lauren Macdonald is a 42 year old female who presents to clinic today for the following health issues:      1.) Headache, x 1.5 weeks. States headache is in the back of her head. Constant throbbing pain.  Has tried Ibuprofen and Tylenol does not take away pain, just lessons. It. 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  Additional history: as documented    Patient Active Problem List   Diagnosis     CARDIOVASCULAR SCREENING; LDL GOAL LESS THAN 160     Esophageal reflux     Anxiety     ASCUS with positive high risk HPV cervical     Granulomatous lung disease (H)     Past Surgical History:   Procedure Laterality Date     CYSTOSCOPY N/A 9/6/2017    Procedure: CYSTOSCOPY;;  Surgeon: Sadie Mendes MD;  Location: [facility]     D & C      postpartum hemorrhage     ESOPHAGOSCOPY, GASTROSCOPY, DUODENOSCOPY (EGD), COMBINED N/A 10/6/2014    Procedure: COMBINED ESOPHAGOSCOPY, GASTROSCOPY, DUODENOSCOPY (EGD), BIOPSY SINGLE OR MULTIPLE;  Surgeon: Sina Pimentel MD;  Location: [facility]     HC BREATH HYDROGEN TEST N/A 11/20/2015    Procedure: HYDROGEN BREATH TEST;  Surgeon: Jordan Davalos MD;  Location:  GI     HYSTERECTOMY VAGINAL N/A 9/6/2017    Procedure: HYSTERECTOMY VAGINAL;  Total Vaginal Hysterectomy & Cystoscopy;  Surgeon: Sadie Mendes MD;  Location: [facility]     TUBAL LIGATION  05/19/03    Postpartum tubal fimbriectomy       Social History   Substance Use Topics     Smoking status: Never  Smoker     Smokeless tobacco: Never Used     Alcohol use No      Comment: rare     Family History   Problem Relation Age of Onset     Type 2 Diabetes Maternal Grandmother      Type 2 Diabetes Maternal Grandfather      and stomach problems     HEART DISEASE Maternal Grandfather      Alzheimer Disease Paternal Grandmother      CANCER Paternal Grandfather      mesothelioma     Hypertension Father      CEREBROVASCULAR DISEASE Father 65     minor     Other - See Comments Father      now: spot on lung and ? prostate     Gallbladder Disease Mother      young age, yoon         Current Outpatient Prescriptions   Medication Sig Dispense Refill     cyclobenzaprine (FLEXERIL) 10 MG tablet Take 0.5-1 tablets (5-10 mg) by mouth 3 times daily as needed for muscle spasms 30 tablet 1     acetaminophen (TYLENOL) 325 MG tablet Take 1-2 tablets (325-650 mg) by mouth every 4 hours as needed for other (mild pain) 100 tablet 0     Probiotic Product (PROBIOTIC ADVANCED PO) Take 1 capsule by mouth daily        Allergies   Allergen Reactions     Omnicef [Cefdinir] GI Disturbance         Reviewed and updated as needed this visit by clinical staff  Tobacco  Allergies  Meds  Problems  Med Hx  Surg Hx  Fam Hx  Soc Hx        Reviewed and updated as needed this visit by Provider  Tobacco  Allergies  Meds  Problems  Med Hx  Surg Hx  Fam Hx  Soc Hx          ROS:  Constitutional, HEENT, cardiovascular, pulmonary, gi and gu systems are negative, except as otherwise noted.      OBJECTIVE:   /80 (BP Location: Right arm, Patient Position: Chair, Cuff Size: Adult Regular)  Pulse 80  Temp 97.9  F (36.6  C) (Tympanic)  Resp 16  Wt 122 lb 3.2 oz (55.4 kg)  LMP 08/21/2017 (Approximate)  BMI 20.98 kg/m2  Body mass index is 20.98 kg/(m^2).  GENERAL APPEARANCE: healthy, alert and no distress  EYES: Eyes grossly normal to inspection, PERRL and conjunctivae and sclerae normal  HENT: ear canals and TM's normal and nose and mouth without  ulcers or lesions  NECK: no adenopathy, no asymmetry, masses, or scars and thyroid normal to palpation  RESP: lungs clear to auscultation - no rales, rhonchi or wheezes  CV: regular rates and rhythm, normal S1 S2, no S3 or S4 and no murmur, click or rub  MS: extremities normal- no gross deformities noted, peripheral pulses normal, decreased range of motion of cervical spine due to pain in shoulder and tenderness of upper trapezius R > L, decreased range of motion of right shoulder due to pain in upper shoulder, no swelling, scapular winging or ecchymosis  NEURO: Normal strength and tone, mentation intact and speech normal    Diagnostic Test Results:  none     ASSESSMENT/PLAN:     1. Tension headache  Patient's pain consistent with tension headache secondary to upper trapezius strain/tension. Discussed decreasing muscle tension with muscle relaxant, ice, range of motion exercises and seeing chiropractor should improve things over the next week or two. Patient to follow up if symptoms not improved after this time.   - cyclobenzaprine (FLEXERIL) 10 MG tablet; Take 0.5-1 tablets (5-10 mg) by mouth 3 times daily as needed for muscle spasms  Dispense: 30 tablet; Refill: 1    Patient Instructions     Start Flexeril muscle relaxant as needed - watch while driving or operating machinery    Okay to do Ibuprofen as needed for inflammation     Ice trapezius and neck and move neck through range of motion at least two times daily     Would encourage to continue seeing chiropractor     Drink plenty of fluids, especially on chiropractor days    Try sitting in an epsom salt bath     Do stretches such as the leaning arm stretch in chair     If not improving in a couple of weeks return to clinic to see me for re-evaluation - may consider physical therapy       Head Tilt / Upper Trapezius Stretch (Flexibility)    1. Sit up straight in a chair with your head and neck in a neutral position, ears in line with shoulders. Hold the edge of  your chair seat with your right hand. Tuck your chin in slightly.  2. Tilt your head to the left, while looking straight ahead.  3. Put your left hand on the right side of your head. Gently pull your head to the left. Hold for 30 to 60 seconds. Use gentle pressure to increase the stretch. Don t force your head into position.  4. Return your head and neck to the neutral position.  5. Repeat this exercise 2 times, or as instructed.  6. Switch sides and repeat 2 times, or as instructed.  Challenge yourself  Tuck one end of a towel under your left arm. Then bring the other end over your right shoulder. Pull the towel down on your right shoulder with both hands as you side-bend your head to the left. Repeat with the other side.   Date Last Reviewed: 3/10/2016    2949-9068 [employer]. [address] [place] [address]. All rights reserved. This information is not intended as a substitute for professional medical care. Always follow your healthcare professional's instructions.        Shoulder and Upper Back Stretch  To start, stand tall with your ears, shoulders, and hips in line. Your feet should be slightly apart, positioned just under your hips. Focus your eyes directly in front of you.  this position for a few seconds before starting your exercise. This helps increase your awareness of proper posture.          Reach overhead and slightly back with both arms. Keep your shoulders and neck aligned and your elbows behind your shoulders:    With your palms facing the ceiling, turn your fingers inward.    Take a deep breath. Breathe out, and lower your elbows toward your buttocks. Hold for 5 seconds, then return to starting position.    Repeat 3 times.  Date Last Reviewed: 8/16/2015 2000-2017 [employer]. [address] [place] [address]. All rights reserved. This information is not intended as a substitute for professional medical care. Always follow your healthcare  "professional's instructions.        * Tension Headache    Muscle Tension Headache (also called \"stress headache\") is a very common cause of head pain. Under stress, some people tense the muscles of their shoulder, neck and scalp without knowing it. If this lasts long enough, a headache can occur. These headaches can be very painful and last for hours or even days.  Home Care:    If you were given pain medicine for this headache, do not drive yourself home. Arrange for a ride, instead. When you get home, try to sleep. You should feel much better when you wake up.    Heat to the back of your neck may relieve neck spasm.    Drink only clear liquids or eat a very light diet to avoid nausea/vomiting until symptoms improve.  Preventing Future Headaches    Identify the sources of stress in your life. These may not be obvious! Learn new ways to handle your stress, such as regular exercise, biofeedback, self-hypnosis and meditation. For more information about this, consult your doctor or go to a local bookstore and review the many books and tapes on this subject.    At the first sign of a tension headache, take time out if possible. Remove yourself from the stressful situation, find a quiet comfortable place to sit or lie down and let yourself relax. Heat and deep massage of the tight areas in the neck and shoulders may help reduce muscle spasm. Medicine, such as ibuprofen (Advil or Motrin) or a prescribed muscle relaxant may be helpful at this point.  Follow Up with your doctor if the headache is not better within the next 24 hours. If you have frequent headaches you should discuss a treatment plan with your primary care doctor. Ask if you can have medicine to take at home the next time you get a bad headache. This may avoid the need for a visit to the emergency department in the future. Poorly controlled chronic headaches may require a referral to a neurologist (headache specialist).  Call your Doctor Or Get [facility] " Attention if any of the following occur:    Worsening of your head pain or no improvement within 24 hours    Repeated vomiting (unable to keep liquids down)    Fever of 100.4 F (38.0 C) or higher, or as directed by your healthcare provider    Stiff neck    Extreme drowsiness, confusion or fainting    Dizziness, vertigo (dizziness with spinning sensation)    Weakness of an arm or leg or one side of the face    Difficulty with speech or vision    0405-1256 [employer]. [address] [place] [address]. All rights reserved. This information is not intended as a substitute for professional medical care. Always follow your healthcare professional's instructions.  This information has been modified by your health care provider with permission from the publisher.            CANDICE Conner [facility]

## 2018-08-05 ENCOUNTER — HEALTH MAINTENANCE LETTER (OUTPATIENT)
Age: 43
End: 2018-08-05

## 2019-02-18 ENCOUNTER — OFFICE VISIT (OUTPATIENT)
Dept: URGENT CARE | Facility: URGENT CARE | Age: 44
End: 2019-02-18

## 2019-02-18 VITALS
RESPIRATION RATE: 14 BRPM | WEIGHT: 118.2 LBS | OXYGEN SATURATION: 97 % | TEMPERATURE: 98.1 F | SYSTOLIC BLOOD PRESSURE: 132 MMHG | BODY MASS INDEX: 20.29 KG/M2 | HEART RATE: 84 BPM | DIASTOLIC BLOOD PRESSURE: 87 MMHG

## 2019-02-18 DIAGNOSIS — J01.00 ACUTE NON-RECURRENT MAXILLARY SINUSITIS: Primary | ICD-10-CM

## 2019-02-18 PROCEDURE — 99213 OFFICE O/P EST LOW 20 MIN: CPT | Performed by: FAMILY MEDICINE

## 2019-02-19 NOTE — PROGRESS NOTES
Subjective:   Lauren Macdonald is a 43 year old female who presents for   Chief Complaint   Patient presents with     Sinus Problem     couple weeks, congestion, sinus pressure, facial pain, all on the right side, can feel throbbing on right side of face    NO hx of recurrent sinus infections.  Has tried afrin and tylenol cold/sinus.   R side worse than left. Pressure changes with position. Having front teeth pain.     Patient Active Problem List    Diagnosis Date Noted     Granulomatous lung disease (H) 10/16/2017     Priority: Medium     Asymptomatic.  Found on chest CT       ASCUS with positive high risk HPV cervical 06/30/2017     Priority: Medium     6/30/17 ASCUS Pap, + HR HPV (not 16/18). Plan colp  9/6/17 Hysterectomy - benign. Plan pap in 12 months.        Anxiety 06/01/2015     Priority: Medium     Esophageal reflux 11/19/2013     Priority: Medium     CARDIOVASCULAR SCREENING; LDL GOAL LESS THAN 160 10/31/2010     Priority: Medium       Current Outpatient Medications   Medication     acetaminophen (TYLENOL) 325 MG tablet     amoxicillin-clavulanate (AUGMENTIN) 875-125 MG tablet     cyclobenzaprine (FLEXERIL) 10 MG tablet     Probiotic Product (PROBIOTIC ADVANCED PO)     No current facility-administered medications for this visit.        ROS:  As above per HPI    Objective:   /87   Pulse 84   Temp 98.1  F (36.7  C) (Tympanic)   Resp 14   Wt 53.6 kg (118 lb 3.2 oz)   LMP 08/21/2017 (Approximate)   SpO2 97%   BMI 20.29 kg/m  , Body mass index is 20.29 kg/m .  Gen:  NAD, well-nourished, sitting in chair comfortably  HEENT: EOMI, sclera anicteric, Head normocephalic, ; nares patent; mosit mucous membranes  Neck: trachea midline, no thyromegaly  CV:  Hemodynamically stable, RRR  Pulm:  no increased work of breathing   Extrem: no cyanosis, edema or clubbing  Skin: no obvious rashes or abnormalities  Psych: Euthymic, linear thoughts, normal rate of speech      Assessment & Plan:   Lauren RANGEL  Renae, 43 year old female who presents with:    Acute non-recurrent maxillary sinusitis  Symptoms > 10 days and not improving. Congestion, teeth pain, maxillary sinus pressure - will proceed with Augmentin BID x 10 days and recommend nasal steroid use once daily. Follow-up as needed.   - amoxicillin-clavulanate (AUGMENTIN) 875-125 MG tablet  Dispense: 20 tablet; Refill: 0      Fransisco Sen MD   [facility]    The use of Dragon/Geniuzz dictation services may have been used to construct the content in this note; any grammatical or spelling errors are non-intentional. Please contact the author of this note directly if you are in need of any clarification.

## 2019-02-27 ENCOUNTER — TELEPHONE (OUTPATIENT)
Dept: FAMILY MEDICINE | Facility: CLINIC | Age: 44
End: 2019-02-27

## 2019-02-27 DIAGNOSIS — R87.810 ASCUS WITH POSITIVE HIGH RISK HPV CERVICAL: ICD-10-CM

## 2019-02-27 DIAGNOSIS — R87.610 ASCUS WITH POSITIVE HIGH RISK HPV CERVICAL: ICD-10-CM

## 2019-02-27 NOTE — TELEPHONE ENCOUNTER
Pt is past due for f/u pap smear.  [facility] clinic and schedule.  Muriel Metz,    Pap Tracking

## 2019-04-30 PROBLEM — R87.810 ASCUS WITH POSITIVE HIGH RISK HPV CERVICAL: Status: ACTIVE | Noted: 2017-06-30

## 2019-04-30 PROBLEM — R87.610 ASCUS WITH POSITIVE HIGH RISK HPV CERVICAL: Status: ACTIVE | Noted: 2017-06-30

## 2019-05-01 PROBLEM — R87.810 ASCUS WITH POSITIVE HIGH RISK HPV CERVICAL: Status: RESOLVED | Noted: 2017-06-30 | Resolved: 2019-05-01

## 2019-05-01 PROBLEM — R87.610 ASCUS WITH POSITIVE HIGH RISK HPV CERVICAL: Status: RESOLVED | Noted: 2017-06-30 | Resolved: 2019-05-01

## 2019-05-01 NOTE — PROGRESS NOTES
Dr. Mendes,  Can we discontinue screening for this pt? I don't see any history of TOMAS 2 or greater in epic and hysterectomy pathology was normal. Had hysterectomy for Excessive or frequent menstruation. Please advise. Thank you!   Heather Martínez RN  Pap Tracking

## 2019-05-01 NOTE — PROGRESS NOTES
Copied and pasted from routine comment:     Yes, we can discontinue screening.  Sadie Mendes M.D.

## 2019-06-17 ENCOUNTER — OFFICE VISIT (OUTPATIENT)
Dept: FAMILY MEDICINE | Facility: CLINIC | Age: 44
End: 2019-06-17
Payer: COMMERCIAL

## 2019-06-17 VITALS
BODY MASS INDEX: 18.92 KG/M2 | WEIGHT: 110.2 LBS | SYSTOLIC BLOOD PRESSURE: 118 MMHG | OXYGEN SATURATION: 97 % | TEMPERATURE: 98.8 F | DIASTOLIC BLOOD PRESSURE: 80 MMHG | HEART RATE: 68 BPM | RESPIRATION RATE: 16 BRPM

## 2019-06-17 DIAGNOSIS — H61.21 IMPACTED CERUMEN OF RIGHT EAR: Primary | ICD-10-CM

## 2019-06-17 PROCEDURE — 69209 REMOVE IMPACTED EAR WAX UNI: CPT | Mod: RT | Performed by: PHYSICIAN ASSISTANT

## 2019-06-17 ASSESSMENT — ENCOUNTER SYMPTOMS
ABDOMINAL PAIN: 0
MYALGIAS: 0
NAUSEA: 0
SORE THROAT: 0
FEVER: 0
DIARRHEA: 0
COUGH: 0
EYE DISCHARGE: 0
CHILLS: 0
BLURRED VISION: 0
PALPITATIONS: 0
HEADACHES: 0
WHEEZING: 0
SHORTNESS OF BREATH: 0
VOMITING: 0
EYE REDNESS: 0

## 2019-06-17 NOTE — PROGRESS NOTES
Subjective     Lauren Macdonald is a 44 year old female who presents to clinic today for the following health issues:    HPI   Ringing and fullness in both ears.      Duration: for a couple of days    Description (location/character/radiation): Both ears have a ringing noise and feel full (ear wax)    Intensity:  mild    Accompanying signs and symptoms: none    History (similar episodes/previous evaluation): None    Precipitating or alleviating factors: Loud noises makes ringing louder    Therapies tried and outcome: Ear drops and benadryl         Patient Active Problem List   Diagnosis     CARDIOVASCULAR SCREENING; LDL GOAL LESS THAN 160     Esophageal reflux     Anxiety     Granulomatous lung disease (H)     Past Surgical History:   Procedure Laterality Date     CYSTOSCOPY N/A 9/6/2017    Procedure: CYSTOSCOPY;;  Surgeon: Sadie Mendes MD;  Location: [facility]     D & C      postpartum hemorrhage     ESOPHAGOSCOPY, GASTROSCOPY, DUODENOSCOPY (EGD), COMBINED N/A 10/6/2014    Procedure: COMBINED ESOPHAGOSCOPY, GASTROSCOPY, DUODENOSCOPY (EGD), BIOPSY SINGLE OR MULTIPLE;  Surgeon: Sina Pimentel MD;  Location: [facility]     HC BREATH HYDROGEN TEST N/A 11/20/2015    Procedure: HYDROGEN BREATH TEST;  Surgeon: Jordan Davalos MD;  Location:  GI     HYSTERECTOMY VAGINAL N/A 9/6/2017    Procedure: HYSTERECTOMY VAGINAL;  Total Vaginal Hysterectomy & Cystoscopy;  Surgeon: Sadie Mendes MD;  Location: [facility]     TUBAL LIGATION  05/19/03    Postpartum tubal fimbriectomy       Social History     Tobacco Use     Smoking status: Current Every Day Smoker     Packs/day: 0.25     Types: Cigarettes     Smokeless tobacco: Never Used   Substance Use Topics     Alcohol use: No     Alcohol/week: 0.0 oz     Comment: rare     Family History   Problem Relation Age of Onset     Diabetes Type 2  Maternal Grandmother      Diabetes Type 2  Maternal Grandfather         and stomach problems     Heart Disease Maternal Grandfather       Alzheimer Disease Paternal Grandmother      Cancer Paternal Grandfather         mesothelioma     Hypertension Father      Cerebrovascular Disease Father 65        minor     Other - See Comments Father         now: spot on lung and ? prostate     Lung Cancer Father      Gallbladder Disease Mother         young age, yoon     Cancer Maternal Aunt          Current Outpatient Medications   Medication Sig Dispense Refill     acetaminophen (TYLENOL) 325 MG tablet Take 1-2 tablets (325-650 mg) by mouth every 4 hours as needed for other (mild pain) 100 tablet 0     Allergies   Allergen Reactions     Omnicef [Cefdinir] GI Disturbance         Reviewed and updated as needed this visit by Provider  Tobacco  Allergies  Meds  Problems  Med Hx  Surg Hx  Fam Hx       Review of Systems   Constitutional: Negative for chills, fever and malaise/fatigue.   HENT: Negative for congestion, ear pain and sore throat.         Plugged ears   Eyes: Negative for blurred vision, discharge and redness.   Respiratory: Negative for cough, shortness of breath and wheezing.    Cardiovascular: Negative for chest pain and palpitations.   Gastrointestinal: Negative for abdominal pain, diarrhea, nausea and vomiting.   Musculoskeletal: Negative for joint pain and myalgias.   Skin: Negative for rash.   Neurological: Negative for headaches.           Objective    /80 (BP Location: Right arm, Patient Position: Sitting, Cuff Size: Adult Regular)   Pulse 68   Temp 98.8  F (37.1  C) (Tympanic)   Resp 16   Wt 50 kg (110 lb 3.2 oz)   LMP 08/21/2017 (Approximate)   SpO2 97%   BMI 18.92 kg/m    Body mass index is 18.92 kg/m .    Physical Exam   Constitutional: She appears well-developed and well-nourished.   HENT:   Head: Normocephalic.   Left Ear: Tympanic membrane and ear canal normal.   Mouth/Throat: Oropharynx is clear and moist.   Impacted cerumen in right ear. Ear lavage performed and canal cleared. Right TM is gray and intact.    Eyes:  Pupils are equal, round, and reactive to light. Conjunctivae are normal.   Cardiovascular: Normal rate and normal heart sounds.   Pulmonary/Chest: Effort normal and breath sounds normal.   Skin: Skin is warm and dry. No rash noted.   Psychiatric: She has a normal mood and affect. Her behavior is normal.         Diagnostic Test Results:  none         Assessment & Plan     1. Impacted cerumen of right ear  Cerumen cleared from canal. Patient had relief of symptoms. Follow up as needed.     - HC REMOVAL IMPACTED CERUMEN IRRIGATION/LVG UNILAT       Lilo Voss PA-C  [facility]

## 2019-06-17 NOTE — NURSING NOTE
"Chief Complaint   Patient presents with     Tinnitus       Initial /80 (BP Location: Right arm, Patient Position: Sitting, Cuff Size: Adult Regular)   Pulse 68   Temp 98.8  F (37.1  C) (Tympanic)   Resp 16   Wt 50 kg (110 lb 3.2 oz)   LMP 08/21/2017 (Approximate)   SpO2 97%   BMI 18.92 kg/m   Estimated body mass index is 18.92 kg/m  as calculated from the following:    Height as of 10/16/17: 1.626 m (5' 4\").    Weight as of this encounter: 50 kg (110 lb 3.2 oz).    Patient presents to the clinic using No DME    Health Maintenance that is potentially due pending provider review:  NONE    n/a    Is there anyone who you would like to be able to receive your results? No  If yes have patient fill out AARTI    Ana Ponce CMA    "

## 2019-08-10 ENCOUNTER — HOSPITAL ENCOUNTER (EMERGENCY)
Facility: CLINIC | Age: 44
Discharge: HOME | End: 2019-08-10
Attending: NURSE PRACTITIONER | Admitting: NURSE PRACTITIONER
Payer: COMMERCIAL

## 2019-08-10 VITALS
TEMPERATURE: 98 F | HEART RATE: 91 BPM | RESPIRATION RATE: 16 BRPM | OXYGEN SATURATION: 100 % | WEIGHT: 115 LBS | BODY MASS INDEX: 19.74 KG/M2 | DIASTOLIC BLOOD PRESSURE: 88 MMHG | SYSTOLIC BLOOD PRESSURE: 151 MMHG

## 2019-08-10 DIAGNOSIS — N39.0 URINARY TRACT INFECTION: ICD-10-CM

## 2019-08-10 LAB
ALBUMIN UR-MCNC: 30 MG/DL
APPEARANCE UR: CLEAR
BACTERIA #/AREA URNS HPF: ABNORMAL /HPF
BILIRUB UR QL STRIP: NEGATIVE
COLOR UR AUTO: ABNORMAL
GLUCOSE UR STRIP-MCNC: NEGATIVE MG/DL
HCG UR QL: NEGATIVE
HGB UR QL STRIP: ABNORMAL
KETONES UR STRIP-MCNC: NEGATIVE MG/DL
LEUKOCYTE ESTERASE UR QL STRIP: ABNORMAL
NITRATE UR QL: NEGATIVE
PH UR STRIP: 6 PH (ref 5–7)
RBC #/AREA URNS AUTO: 13 /HPF (ref 0–2)
SOURCE: ABNORMAL
SP GR UR STRIP: 1 (ref 1–1.03)
SQUAMOUS #/AREA URNS AUTO: <1 /HPF (ref 0–1)
UROBILINOGEN UR STRIP-MCNC: 0 MG/DL (ref 0–2)
WBC #/AREA URNS AUTO: 26 /HPF (ref 0–5)

## 2019-08-10 PROCEDURE — 87088 URINE BACTERIA CULTURE: CPT | Performed by: NURSE PRACTITIONER

## 2019-08-10 PROCEDURE — 81025 URINE PREGNANCY TEST: CPT | Performed by: NURSE PRACTITIONER

## 2019-08-10 PROCEDURE — 87186 SC STD MICRODIL/AGAR DIL: CPT | Performed by: NURSE PRACTITIONER

## 2019-08-10 PROCEDURE — 81001 URINALYSIS AUTO W/SCOPE: CPT | Performed by: NURSE PRACTITIONER

## 2019-08-10 PROCEDURE — 99214 OFFICE O/P EST MOD 30 MIN: CPT | Mod: Z6 | Performed by: NURSE PRACTITIONER

## 2019-08-10 PROCEDURE — 87086 URINE CULTURE/COLONY COUNT: CPT | Performed by: NURSE PRACTITIONER

## 2019-08-10 PROCEDURE — G0463 HOSPITAL OUTPT CLINIC VISIT: HCPCS | Performed by: NURSE PRACTITIONER

## 2019-08-10 RX ORDER — SULFAMETHOXAZOLE/TRIMETHOPRIM 800-160 MG
1 TABLET ORAL 2 TIMES DAILY
Qty: 10 TABLET | Refills: 0 | Status: SHIPPED | OUTPATIENT
Start: 2019-08-10 | End: 2019-08-15

## 2019-08-10 ASSESSMENT — ENCOUNTER SYMPTOMS
LIGHT-HEADEDNESS: 0
DIZZINESS: 0
ARTHRALGIAS: 0
HEADACHES: 0
FREQUENCY: 1
DYSURIA: 0
HEMATURIA: 1
FLANK PAIN: 1
MYALGIAS: 0
FEVER: 0

## 2019-08-10 NOTE — ED AVS SNAPSHOT
[facility]  [address] [facility] [address]  Phone:  [phone]  Fax:  [phone]                                    Lauren Macdonald   MRN: [id number]    Department:  [facility]   Date of Visit:  8/10/2019           After Visit Summary Signature Page    I have received my discharge instructions, and my questions have been answered. I have discussed any challenges I see with this plan with the nurse or doctor.    ..........................................................................................................................................  Patient/Patient Representative Signature      ..........................................................................................................................................  Patient Representative Print Name and Relationship to Patient    ..................................................               ................................................  Date                                   Time    ..........................................................................................................................................  Reviewed by Signature/Title    ...................................................              ..............................................  Date                                               Time          22EPIC Rev 08/18

## 2019-08-10 NOTE — ED PROVIDER NOTES
History     Chief Complaint   Patient presents with     UTI     HPI  Lauren Macdonald is a 44 year old female who presents to the urgent care for evalutaion of urinary urgency, low back pain, urinary frequency and hematuria beginning today. Denies fevers, dysuria, pelvic pain, abnormal discharge and vaginal itching. No concern for STI.     Allergies:  Allergies   Allergen Reactions     Omnicef [Cefdinir] GI Disturbance       Problem List:    Patient Active Problem List    Diagnosis Date Noted     Granulomatous lung disease (H) 10/16/2017     Priority: Medium     Asymptomatic.  Found on chest CT       Anxiety 06/01/2015     Priority: Medium     Esophageal reflux 11/19/2013     Priority: Medium     CARDIOVASCULAR SCREENING; LDL GOAL LESS THAN 160 10/31/2010     Priority: Medium        Past Medical History:    Past Medical History:   Diagnosis Date     Mononucleosis      Postpartum hemorrhage      Reaction, situational        Past Surgical History:    Past Surgical History:   Procedure Laterality Date     CYSTOSCOPY N/A 9/6/2017    Procedure: CYSTOSCOPY;;  Surgeon: Sadie Mendes MD;  Location: [facility]     D & C      postpartum hemorrhage     ESOPHAGOSCOPY, GASTROSCOPY, DUODENOSCOPY (EGD), COMBINED N/A 10/6/2014    Procedure: COMBINED ESOPHAGOSCOPY, GASTROSCOPY, DUODENOSCOPY (EGD), BIOPSY SINGLE OR MULTIPLE;  Surgeon: Sina Pimentel MD;  Location: [facility]     HC BREATH HYDROGEN TEST N/A 11/20/2015    Procedure: HYDROGEN BREATH TEST;  Surgeon: Jordan Davalos MD;  Location:  GI     HYSTERECTOMY VAGINAL N/A 9/6/2017    Procedure: HYSTERECTOMY VAGINAL;  Total Vaginal Hysterectomy & Cystoscopy;  Surgeon: Sadie Mendes MD;  Location: [facility]     TUBAL LIGATION  05/19/03    Postpartum tubal fimbriectomy       Family History:    Family History   Problem Relation Age of Onset     Diabetes Type 2  Maternal Grandmother      Diabetes Type 2  Maternal Grandfather         and stomach problems     Heart Disease  Maternal Grandfather      Alzheimer Disease Paternal Grandmother      Cancer Paternal Grandfather         mesothelioma     Hypertension Father      Cerebrovascular Disease Father 65        minor     Other - See Comments Father         now: spot on lung and ? prostate     Lung Cancer Father      Gallbladder Disease Mother         young age, yoon     Cancer Maternal Aunt        Social History:  Marital Status:  Single [1]  Social History     Tobacco Use     Smoking status: Current Every Day Smoker     Packs/day: 0.25     Types: Cigarettes     Smokeless tobacco: Never Used   Substance Use Topics     Alcohol use: No     Alcohol/week: 0.0 oz     Comment: rare     Drug use: No        Medications:      sulfamethoxazole-trimethoprim (BACTRIM DS) 800-160 MG tablet   acetaminophen (TYLENOL) 325 MG tablet         Review of Systems   Constitutional: Negative for fever.   Genitourinary: Positive for flank pain, frequency, hematuria and urgency. Negative for dysuria, pelvic pain and vaginal discharge.   Musculoskeletal: Negative for arthralgias and myalgias.   Neurological: Negative for dizziness, light-headedness and headaches.       Physical Exam   BP: (!) 151/88  Pulse: 91  Temp: 98  F (36.7  C)  Resp: 16  Weight: 52.2 kg (115 lb)  SpO2: 100 %      Physical Exam   Constitutional: She is oriented to person, place, and time. She appears well-developed and well-nourished. No distress.   Neck: Normal range of motion. Neck supple.   Cardiovascular: Normal rate and regular rhythm.   Pulmonary/Chest: Effort normal and breath sounds normal. No respiratory distress.   Abdominal: Soft. She exhibits no distension. There is no tenderness.   Musculoskeletal: Normal range of motion.   Neurological: She is alert and oriented to person, place, and time.   Skin: Skin is warm. Capillary refill takes less than 2 seconds. She is not diaphoretic.       ED Course        Procedures      Results for orders placed or performed during the hospital  encounter of 08/10/19 (from the past 24 hour(s))   UA with Microscopic   Result Value Ref Range    Color Urine [place]     Appearance Urine Clear     Glucose Urine Negative NEG^Negative mg/dL    Bilirubin Urine Negative NEG^Negative    Ketones Urine Negative NEG^Negative mg/dL    Specific Gravity Urine 1.001 (L) 1.003 - 1.035    Blood Urine Large (A) NEG^Negative    pH Urine 6.0 5.0 - 7.0 pH    Protein Albumin Urine 30 (A) NEG^Negative mg/dL    Urobilinogen mg/dL 0.0 0.0 - 2.0 mg/dL    Nitrite Urine Negative NEG^Negative    Leukocyte Esterase Urine Moderate (A) NEG^Negative    Source Midstream Urine     WBC Urine 26 (H) 0 - 5 /HPF    RBC Urine 13 (H) 0 - 2 /HPF    Bacteria Urine Few (A) NEG^Negative /HPF    Squamous Epithelial /HPF Urine <1 0 - 1 /HPF   HCG qualitative urine (UPT)   Result Value Ref Range    HCG Qual Urine Negative NEG^Negative       Medications - No data to display    Assessments & Plan (with Medical Decision Making)   Patient is a 44 year old female who presents to the urgent care for evaluation of urinary frequency, urgency, hematuria and flank pain. Urinalysis indicating urinary tract infection, culture sent. Plan for 5 days Bactrim DS. May use OTC medications as needed. Increase hydration. Agreeable to plan of care, all questions answered. Patient discharged in stable condition.   I have reviewed the nursing notes.    I have reviewed the findings, diagnosis, plan and need for follow up with the patient.    Discharge Medication List as of 8/10/2019  6:20 PM      START taking these medications    Details   sulfamethoxazole-trimethoprim (BACTRIM DS) 800-160 MG tablet Take 1 tablet by mouth 2 times daily for 5 days, Disp-10 tablet, R-0, E-Prescribe             Final diagnoses:   Urinary tract infection       8/10/2019   [facility] EMERGENCY DEPARTMENT     Adamaris Alcala, APRN CNP  08/10/19 1927

## 2019-08-11 LAB
BACTERIA SPEC CULT: ABNORMAL
Lab: ABNORMAL
SPECIMEN SOURCE: ABNORMAL

## 2019-08-12 NOTE — RESULT ENCOUNTER NOTE
Final Urine Culture Report on 8/11/19  Emergency Dept/Urgent Care discharge antibiotic prescribed: Sulfamethoxazole-Trimethoprim (Bactrim DS, Septra DS) 800-160 mg PO tablet, 1 tablet by mouth 2 times daily for 5 days  #1. Bacteria, 10,000 to 50,000 colonies/ml Escherichia coli, is SUSCEPTIBLE to Antibiotic.    As per [place] ED Lab Result protocol, no change in antibiotic therapy.

## 2019-10-30 ENCOUNTER — ANCILLARY PROCEDURE (OUTPATIENT)
Dept: GENERAL RADIOLOGY | Facility: CLINIC | Age: 44
End: 2019-10-30
Attending: NURSE PRACTITIONER
Payer: COMMERCIAL

## 2019-10-30 ENCOUNTER — OFFICE VISIT (OUTPATIENT)
Dept: URGENT CARE | Facility: URGENT CARE | Age: 44
End: 2019-10-30
Payer: COMMERCIAL

## 2019-10-30 VITALS
HEIGHT: 64 IN | DIASTOLIC BLOOD PRESSURE: 72 MMHG | WEIGHT: 115 LBS | RESPIRATION RATE: 16 BRPM | OXYGEN SATURATION: 95 % | HEART RATE: 83 BPM | TEMPERATURE: 98.5 F | SYSTOLIC BLOOD PRESSURE: 128 MMHG | BODY MASS INDEX: 19.63 KG/M2

## 2019-10-30 DIAGNOSIS — R05.9 COUGH: ICD-10-CM

## 2019-10-30 DIAGNOSIS — R06.02 SOB (SHORTNESS OF BREATH): ICD-10-CM

## 2019-10-30 DIAGNOSIS — J40 BRONCHITIS: Primary | ICD-10-CM

## 2019-10-30 DIAGNOSIS — J84.10 GRANULOMATOUS LUNG DISEASE (H): ICD-10-CM

## 2019-10-30 PROCEDURE — 99214 OFFICE O/P EST MOD 30 MIN: CPT | Performed by: NURSE PRACTITIONER

## 2019-10-30 PROCEDURE — 71046 X-RAY EXAM CHEST 2 VIEWS: CPT

## 2019-10-30 RX ORDER — ALBUTEROL SULFATE 90 UG/1
2 AEROSOL, METERED RESPIRATORY (INHALATION) EVERY 6 HOURS PRN
Qty: 1 INHALER | Refills: 0 | Status: SHIPPED | OUTPATIENT
Start: 2019-10-30 | End: 2020-02-20

## 2019-10-30 RX ORDER — BENZONATATE 200 MG/1
200 CAPSULE ORAL 3 TIMES DAILY PRN
Qty: 30 CAPSULE | Refills: 0 | Status: SHIPPED | OUTPATIENT
Start: 2019-10-30 | End: 2020-02-20

## 2019-10-30 RX ORDER — PREDNISONE 20 MG/1
40 TABLET ORAL DAILY
Qty: 10 TABLET | Refills: 0 | Status: SHIPPED | OUTPATIENT
Start: 2019-10-30 | End: 2020-02-20

## 2019-10-30 ASSESSMENT — MIFFLIN-ST. JEOR: SCORE: 1161.64

## 2019-10-30 NOTE — PROGRESS NOTES
Subjective     Lauren Macdonald is a 44 year old female who presents to clinic today for the following health issues:    HPI     Chief Complaint   Patient presents with     URI     started couple days ago.  Worsening since yesterday.  Stuck in chest.  Hard time breathing.  Unable to sleep last night            Patient Active Problem List   Diagnosis     CARDIOVASCULAR SCREENING; LDL GOAL LESS THAN 160     Esophageal reflux     Anxiety     Granulomatous lung disease (H)     Past Surgical History:   Procedure Laterality Date     CYSTOSCOPY N/A 9/6/2017    Procedure: CYSTOSCOPY;;  Surgeon: Sadie Mendes MD;  Location: [facility]     D & C      postpartum hemorrhage     ESOPHAGOSCOPY, GASTROSCOPY, DUODENOSCOPY (EGD), COMBINED N/A 10/6/2014    Procedure: COMBINED ESOPHAGOSCOPY, GASTROSCOPY, DUODENOSCOPY (EGD), BIOPSY SINGLE OR MULTIPLE;  Surgeon: Sina Pimentel MD;  Location: [facility]     HC BREATH HYDROGEN TEST N/A 11/20/2015    Procedure: HYDROGEN BREATH TEST;  Surgeon: Jordan Davalos MD;  Location:  GI     HYSTERECTOMY VAGINAL N/A 9/6/2017    Procedure: HYSTERECTOMY VAGINAL;  Total Vaginal Hysterectomy & Cystoscopy;  Surgeon: Sadie Mendes MD;  Location: [facility]     TUBAL LIGATION  05/19/03    Postpartum tubal fimbriectomy       Social History     Tobacco Use     Smoking status: Current Every Day Smoker     Packs/day: 0.25     Types: Cigarettes     Smokeless tobacco: Never Used   Substance Use Topics     Alcohol use: No     Alcohol/week: 0.0 standard drinks     Comment: rare     Family History   Problem Relation Age of Onset     Diabetes Type 2  Maternal Grandmother      Diabetes Type 2  Maternal Grandfather         and stomach problems     Heart Disease Maternal Grandfather      Alzheimer Disease Paternal Grandmother      Cancer Paternal Grandfather         mesothelioma     Hypertension Father      Cerebrovascular Disease Father 65        minor     Other - See Comments Father         now: spot on lung and  "? prostate     Lung Cancer Father      Gallbladder Disease Mother         young age, yoon     Cancer Maternal Aunt          Current Outpatient Medications   Medication Sig Dispense Refill     acetaminophen (TYLENOL) 325 MG tablet Take 1-2 tablets (325-650 mg) by mouth every 4 hours as needed for other (mild pain) 100 tablet 0     albuterol (PROAIR HFA/PROVENTIL HFA/VENTOLIN HFA) 108 (90 Base) MCG/ACT inhaler Inhale 2 puffs into the lungs every 6 hours as needed for shortness of breath / dyspnea or wheezing 1 Inhaler 0     benzonatate (TESSALON) 200 MG capsule Take 1 capsule (200 mg) by mouth 3 times daily as needed for cough 30 capsule 0     predniSONE (DELTASONE) 20 MG tablet Take 2 tablets (40 mg) by mouth daily for 5 days 10 tablet 0     Allergies   Allergen Reactions     Omnicef [Cefdinir] GI Disturbance         Reviewed and updated as needed this visit by Provider  Tobacco  Allergies  Meds  Problems  Med Hx  Surg Hx  Fam Hx         Review of Systems   ROS COMP: Constitutional, HEENT, cardiovascular, pulmonary, GI, , musculoskeletal, neuro, skin, endocrine and psych systems are negative, except as otherwise noted.      Objective    /72 (BP Location: Right arm, Patient Position: Chair, Cuff Size: Adult Regular)   Pulse 83   Temp 98.5  F (36.9  C) (Tympanic)   Resp 16   Ht 1.626 m (5' 4\")   Wt 52.2 kg (115 lb)   LMP 08/21/2017 (Approximate)   SpO2 95%   BMI 19.74 kg/m    Body mass index is 19.74 kg/m .  Physical Exam   GENERAL: healthy, alert and no distress, nontoxic in appearance  EYES: Eyes grossly normal to inspection, PERRL and conjunctivae and sclerae normal  HENT: ear canals and TM's normal, nose and mouth without ulcers or lesions  NECK: no adenopathy, supple with full ROM  RESP: lungs moving air throughout - no rales, rhonchi but has mild wheezes throughout  CV: regular rate and rhythm, normal S1 S2, no S3 or S4, no murmur, click or rub, no peripheral edema   ABDOMEN: soft, " nontender, no hepatosplenomegaly, no masses and bowel sounds normal  MS: no gross musculoskeletal defects noted, no edema  No rash    Diagnostic Test Results:CHEST TWO VIEWS 10/30/2019 6:39 PM      HISTORY: Cough. SOB (shortness of breath). Granulomatous lung disease  (H).     COMPARISON: September 27, 2017                                                                       IMPRESSION: There are no acute infiltrates. The cardiac silhouette is  not enlarged. Pulmonary vasculature is unremarkable.     LAUREN JAVED MD  Labs reviewed in Epic  No results found for this or any previous visit (from the past 24 hour(s)).        Assessment & Plan   Problem List Items Addressed This Visit     Granulomatous lung disease (H)    Relevant Orders    XR Chest 2 Views (Completed)      Other Visit Diagnoses     Bronchitis    -  Primary    Relevant Medications    predniSONE (DELTASONE) 20 MG tablet    albuterol (PROAIR HFA/PROVENTIL HFA/VENTOLIN HFA) 108 (90 Base) MCG/ACT inhaler    benzonatate (TESSALON) 200 MG capsule    Cough        Relevant Medications    predniSONE (DELTASONE) 20 MG tablet    albuterol (PROAIR HFA/PROVENTIL HFA/VENTOLIN HFA) 108 (90 Base) MCG/ACT inhaler    benzonatate (TESSALON) 200 MG capsule    Other Relevant Orders    XR Chest 2 Views (Completed)    SOB (shortness of breath)        Relevant Medications    predniSONE (DELTASONE) 20 MG tablet    albuterol (PROAIR HFA/PROVENTIL HFA/VENTOLIN HFA) 108 (90 Base) MCG/ACT inhaler    benzonatate (TESSALON) 200 MG capsule    Other Relevant Orders    XR Chest 2 Views (Completed)             Tobacco Cessation:   reports that she has been smoking cigarettes. She has been smoking about 0.25 packs per day. She has never used smokeless tobacco.  Tobacco Cessation Action Plan: Information offered: Patient not interested at this time        Patient Instructions   Increase rest and fluids. Tylenol and/or Ibuprofen for comfort. Cool mist vaporizer. If your symptoms worsen or  do not resolve follow up with your primary care provider in 1 week and sooner if needed.      Mucinex 600 mg 12 hour formula for ear, head and chest congestion.  It can also thin post nasal drip which can cause a cough and sore throat.    Indications for emergent return to emergency department discussed with patient, who verbalized good understanding and agreement.  Patient understands the limitations of today's evaluation.           Patient Education     Viral Bronchitis (Adult)    You have a viral bronchitis. Bronchitis is inflammation and swelling of the lining of the lungs. This is often caused by an infection. Symptoms include a dry, hacking cough that is worse at night. The cough may bring up yellow-green mucus. You may also feel short of breath or wheeze. Other symptoms may include tiredness, chest discomfort, and chills.  Bronchitis that is caused by a virus is not treated with antibiotics. Instead, medicines may be given to help relieve symptoms. Symptoms can last up to 2 weeks, although the cough may last much longer.  This illness is contagious during the first few days and is spread through the air by coughing and sneezing, or by direct contact (touching the sick person and then touching your own eyes, nose, or mouth).  Most viral illnesses resolve within 10 to 14 days with rest and simple home remedies, although they may sometimes last for several weeks.  Home care    If symptoms are severe, rest at home for the first 2 to 3 days. When you go back to your usual activities, don't let yourself get too tired.    Do not smoke. Also avoid being exposed to secondhand smoke.    You may use over-the-counter medicine to control fever or pain, unless another pain medicine was prescribed. If you have chronic liver or kidney disease or have ever had a stomach ulcer or gastrointestinal bleeding, talk with your healthcare provider before using these medicines. Also talk to your provider if you are taking medicine to  prevent blood clots. Aspirin should never be given to anyone younger than 18 years of age who is ill with a viral infection or fever. It may cause severe liver or brain damage.    Your appetite may be poor, so a light diet is fine. Avoid dehydration by drinking 6 to 8 glasses of fluids per day (such as water, soft drinks, sports drinks, juices, tea, or soup). Extra fluids will help loosen secretions in the nose and lungs.    Over-the-counter cough, cold, and sore-throat medicines will not shorten the length of the illness, but they may help to reduce symptoms. Don't use decongestants if you have high blood pressure.  Follow-up care  Follow up with your healthcare provider, or as advised. If you had an X-ray or ECG (electrocardiogram), a specialist will review it. You will be notified of any new findings that may affect your care.  If you are age 65 or older, or if you have a chronic lung disease or condition that affects your immune system, or you smoke, ask your healthcare provider about getting a pneumococcal vaccine and a yearly flu shot (influenza vaccine).  When to seek medical advice  Call your healthcare provider right away if any of these occur:    Fever of 100.4 F (38 C) or higher, or as directed by your healthcare provider    Coughing up increased amounts of colored sputum    Weakness, drowsiness, headache, facial pain, ear pain, or a stiff neck  Call 911  Call 911 if any of these occur:    Coughing up blood    Worsening weakness, drowsiness, headache, or stiff neck    Trouble breathing, wheezing, or pain with breathing  Date Last Reviewed: 6/1/2018 2000-2018 [employer]. [address] [place] [address], [place] [address]. All rights reserved. This information is not intended as a substitute for professional medical care. Always follow your healthcare professional's instructions.             Return in about 1 week (around 11/6/2019), or if symptoms worsen or fail to improve, for Follow up with your  primary care provider.    CANDICE García [facility]

## 2019-10-30 NOTE — PATIENT INSTRUCTIONS

## 2019-10-30 NOTE — NURSING NOTE
"Chief Complaint   Patient presents with     URI     started couple days ago.  Worsening since yesterday.  Stuck in chest.  Hard time breathing.  Unable to sleep last night        Initial /72 (BP Location: Right arm, Patient Position: Chair, Cuff Size: Adult Regular)   Pulse 83   Temp 98.5  F (36.9  C) (Tympanic)   Resp 16   Wt 52.2 kg (115 lb)   LMP 08/21/2017 (Approximate)   SpO2 95%   BMI 19.74 kg/m   Estimated body mass index is 19.74 kg/m  as calculated from the following:    Height as of 10/16/17: 1.626 m (5' 4\").    Weight as of this encounter: 52.2 kg (115 lb).    Patient presents to the clinic using No DME    Health Maintenance that is potentially due pending provider review:  NONE    n/a    Is there anyone who you would like to be able to receive your results? No  If yes have patient fill out AARTI    Sylvia Roberson M.A.    "

## 2019-10-31 ENCOUNTER — TELEPHONE (OUTPATIENT)
Dept: FAMILY MEDICINE | Facility: CLINIC | Age: 44
End: 2019-10-31

## 2019-10-31 NOTE — TELEPHONE ENCOUNTER
Reason for Call:  Other     Detailed comments: Lauren says she was seen by Yoli Garland last night and she told her she has bronchitis. She told her her chart showed she has granulomas in her lungs since 2017 and she was unaware of this and wonders what this means and if there is something that needs to be done. She says she was very surprised and says she had never been told this.     Phone Number Patient can be reached at: Home number on file [phone] (home)    Best Time: anytime      Call taken on 10/31/2019 at 8:08 AM by Judie Murray

## 2019-10-31 NOTE — TELEPHONE ENCOUNTER
Please call.    That was part of the history given to radiologist.   On a CT chest 9/27/2019 there was a small granuloma.  This indicates a small area of scar tissue from some past infection.  It does not look significant.   The chest x-ray report indicated normal lungs without signs of and granulomatous or other lung disease.   NAOMI GABRIEL MD

## 2019-11-05 ENCOUNTER — OFFICE VISIT (OUTPATIENT)
Dept: URGENT CARE | Facility: URGENT CARE | Age: 44
End: 2019-11-05
Payer: COMMERCIAL

## 2019-11-05 ENCOUNTER — TELEPHONE (OUTPATIENT)
Dept: FAMILY MEDICINE | Facility: CLINIC | Age: 44
End: 2019-11-05

## 2019-11-05 VITALS
TEMPERATURE: 97.9 F | HEART RATE: 79 BPM | OXYGEN SATURATION: 100 % | DIASTOLIC BLOOD PRESSURE: 72 MMHG | SYSTOLIC BLOOD PRESSURE: 124 MMHG | HEIGHT: 64 IN | BODY MASS INDEX: 19.43 KG/M2 | RESPIRATION RATE: 16 BRPM | WEIGHT: 113.8 LBS

## 2019-11-05 DIAGNOSIS — J22 LOWER RESP. TRACT INFECTION: Primary | ICD-10-CM

## 2019-11-05 PROCEDURE — 99214 OFFICE O/P EST MOD 30 MIN: CPT | Performed by: PHYSICIAN ASSISTANT

## 2019-11-05 RX ORDER — AZITHROMYCIN 250 MG/1
TABLET, FILM COATED ORAL
Qty: 6 TABLET | Refills: 0 | Status: SHIPPED | OUTPATIENT
Start: 2019-11-05 | End: 2020-02-20

## 2019-11-05 RX ORDER — PREDNISONE 20 MG/1
40 TABLET ORAL DAILY
Qty: 10 TABLET | Refills: 0 | Status: SHIPPED | OUTPATIENT
Start: 2019-11-05 | End: 2020-02-20

## 2019-11-05 ASSESSMENT — ENCOUNTER SYMPTOMS
RHINORRHEA: 0
ABDOMINAL PAIN: 0
UNEXPECTED WEIGHT CHANGE: 0
TROUBLE SWALLOWING: 0
DIARRHEA: 0
SINUS PRESSURE: 0
PALPITATIONS: 0
EYE PAIN: 0
FATIGUE: 0
SHORTNESS OF BREATH: 1
BACK PAIN: 0
VOMITING: 0
COUGH: 1
CHEST TIGHTNESS: 0
CHILLS: 1
SORE THROAT: 0
NAUSEA: 0
WHEEZING: 0
EYE REDNESS: 0
FEVER: 0
ARTHRALGIAS: 0
MYALGIAS: 0

## 2019-11-05 ASSESSMENT — MIFFLIN-ST. JEOR: SCORE: 1156.19

## 2019-11-05 NOTE — TELEPHONE ENCOUNTER
S-(situation): asking for refill of prednisone.  States is no better since being seen on 10/30/19.      B-(background): using albuterol inhaler and tessalon as directed.  Took prednisone as directed.    A-(assessment): cough, not better    R-(recommendations): advised needs to be seen  Radha Cabral RN

## 2019-11-05 NOTE — TELEPHONE ENCOUNTER
Patient was seen in UC on 10/30/19 and was given Prednisone. She is still coughing and wheezing and would like to know if she can get more Prednisone. Please advise.    Brigitte [place]

## 2019-11-05 NOTE — LETTER
[facility]  [address] [address]  Phone: [phone]  Fax: [phone]    November 5, 2019        Lauren Macdonald  [address] [facility] [address]          To whom it may concern:    RE: Lauren Macdonald    Patient was seen and treated today at our clinic and missed work 11/06/19 and 11/07/19.     Please contact me for questions or concerns.      Sincerely,        Lilo Voss PA-C

## 2019-11-05 NOTE — PROGRESS NOTES
"SUBJECTIVE:   Lauren Macdonald is a 44 year old female presenting with a chief complaint of   Chief Complaint   Patient presents with     URI     1 week ago was seen for a cough and shortness of breath was given prednisone and inhaler, the prednisone seemed to help taking mucinex 12 hour. Works at [employer] hard to wear a mask all day at work to breath.       She is an established patient of [facility].    URI Adult    Onset of symptoms was 1 week(s) ago.  Course of illness is same.    Severity moderate  Current and Associated symptoms: cough - non-productive and shortness of breath  Treatment measures tried include prednisone, albuterol, Tylenol/Ibuprofen.  Predisposing factors include None.        Review of Systems   Constitutional: Positive for chills. Negative for fatigue, fever and unexpected weight change.   HENT: Negative for congestion, ear pain, postnasal drip, rhinorrhea, sinus pressure, sore throat and trouble swallowing.    Eyes: Negative for pain, redness and visual disturbance.   Respiratory: Positive for cough and shortness of breath. Negative for chest tightness and wheezing.    Cardiovascular: Negative for chest pain and palpitations.   Gastrointestinal: Negative for abdominal pain, diarrhea, nausea and vomiting.   Musculoskeletal: Negative for arthralgias, back pain and myalgias.   Skin: Negative for rash.       Past Medical History:   Diagnosis Date     Mononucleosis      Postpartum hemorrhage      Reaction, situational     \"depression\" during divorce     Family History   Problem Relation Age of Onset     Diabetes Type 2  Maternal Grandmother      Diabetes Type 2  Maternal Grandfather         and stomach problems     Heart Disease Maternal Grandfather      Alzheimer Disease Paternal Grandmother      Cancer Paternal Grandfather         mesothelioma     Hypertension Father      Cerebrovascular Disease Father 65        minor     Other - See Comments Father         now: spot on lung and ? prostate     " "Lung Cancer Father      Gallbladder Disease Mother         young age, yoon     Cancer Maternal Aunt      Current Outpatient Medications   Medication Sig Dispense Refill     acetaminophen (TYLENOL) 325 MG tablet Take 1-2 tablets (325-650 mg) by mouth every 4 hours as needed for other (mild pain) 100 tablet 0     albuterol (PROAIR HFA/PROVENTIL HFA/VENTOLIN HFA) 108 (90 Base) MCG/ACT inhaler Inhale 2 puffs into the lungs every 6 hours as needed for shortness of breath / dyspnea or wheezing 1 Inhaler 0     azithromycin (ZITHROMAX) 250 MG tablet Two tablets first day, then one tablet daily for four days. 6 tablet 0     benzonatate (TESSALON) 200 MG capsule Take 1 capsule (200 mg) by mouth 3 times daily as needed for cough 30 capsule 0     predniSONE (DELTASONE) 20 MG tablet Take 2 tablets (40 mg) by mouth daily for 5 days 10 tablet 0     Social History     Tobacco Use     Smoking status: Current Every Day Smoker     Packs/day: 0.25     Types: Cigarettes     Smokeless tobacco: Never Used   Substance Use Topics     Alcohol use: No     Alcohol/week: 0.0 standard drinks     Comment: rare       OBJECTIVE  /72 (BP Location: Right arm, Patient Position: Sitting, Cuff Size: Adult Regular)   Pulse 79   Temp 97.9  F (36.6  C) (Tympanic)   Resp 16   Ht 1.626 m (5' 4\")   Wt 51.6 kg (113 lb 12.8 oz)   LMP 08/21/2017 (Approximate)   SpO2 100%   BMI 19.53 kg/m      Physical Exam  Constitutional:       Appearance: She is well-developed.   HENT:      Head: Normocephalic.      Right Ear: Tympanic membrane and ear canal normal.      Left Ear: Tympanic membrane and ear canal normal.   Eyes:      Conjunctiva/sclera: Conjunctivae normal.      Pupils: Pupils are equal, round, and reactive to light.   Cardiovascular:      Rate and Rhythm: Normal rate.      Heart sounds: Normal heart sounds.   Pulmonary:      Effort: Pulmonary effort is normal.      Comments: Diffuse course breath sounds and frequent productive sounding cough "   Skin:     General: Skin is warm and dry.      Findings: No rash.   Psychiatric:         Behavior: Behavior normal.         Labs:  No results found for this or any previous visit (from the past 24 hour(s)).    X-Ray was not done.    ASSESSMENT:      ICD-10-CM    1. Lower resp. tract infection J22 azithromycin (ZITHROMAX) 250 MG tablet     predniSONE (DELTASONE) 20 MG tablet        Medical Decision Making:    Differential Diagnosis:  URI Adult/Peds:  Bronchitis-viral, Bronchospasm, Pneumonia, Viral syndrome and Viral upper respiratory illness    Serious Comorbid Conditions:  Adult:  None    PLAN:    URI Adult:  Will treat with azithromycin x 5 days. Also prescribed prednisone 40mg once daily x 5 days, continue with albuterol 2 puffs every 4-6hrs as needed. Discussed how to take/use these medications and what to expect. Get plenty of rest and push fluids. Can use Tylenol and/or ibuprofen as needed for pain and/or fever control. Return to clinic if symptoms worsen or do not improve; otherwise follow up as needed.      Followup:    If not improving or if condition worsens, follow up with your Primary Care Provider    There are no Patient Instructions on file for this visit.

## 2020-02-20 ENCOUNTER — OFFICE VISIT (OUTPATIENT)
Dept: FAMILY MEDICINE | Facility: CLINIC | Age: 45
End: 2020-02-20
Payer: COMMERCIAL

## 2020-02-20 VITALS
WEIGHT: 116 LBS | HEIGHT: 64 IN | SYSTOLIC BLOOD PRESSURE: 120 MMHG | TEMPERATURE: 97.3 F | OXYGEN SATURATION: 100 % | HEART RATE: 71 BPM | BODY MASS INDEX: 19.81 KG/M2 | DIASTOLIC BLOOD PRESSURE: 72 MMHG

## 2020-02-20 DIAGNOSIS — L98.9 SKIN LESION: ICD-10-CM

## 2020-02-20 DIAGNOSIS — L81.9 CHANGING PIGMENTED SKIN LESION: Primary | ICD-10-CM

## 2020-02-20 PROCEDURE — 90715 TDAP VACCINE 7 YRS/> IM: CPT | Performed by: NURSE PRACTITIONER

## 2020-02-20 PROCEDURE — 88305 TISSUE EXAM BY PATHOLOGIST: CPT | Performed by: NURSE PRACTITIONER

## 2020-02-20 PROCEDURE — 88305 TISSUE EXAM BY PATHOLOGIST: CPT | Mod: 26 | Performed by: NURSE PRACTITIONER

## 2020-02-20 PROCEDURE — 99214 OFFICE O/P EST MOD 30 MIN: CPT | Mod: 25 | Performed by: NURSE PRACTITIONER

## 2020-02-20 PROCEDURE — 90471 IMMUNIZATION ADMIN: CPT | Performed by: NURSE PRACTITIONER

## 2020-02-20 PROCEDURE — 11106 INCAL BX SKN SINGLE LES: CPT | Performed by: NURSE PRACTITIONER

## 2020-02-20 ASSESSMENT — PAIN SCALES - GENERAL: PAINLEVEL: NO PAIN (0)

## 2020-02-20 ASSESSMENT — MIFFLIN-ST. JEOR: SCORE: 1166.17

## 2020-02-20 NOTE — PROGRESS NOTES
Subjective     Lauren Macdonald is a 44 year old female who presents to clinic today for the following health issues:    HPI   Skin lesions      Duration: this summer     Description (location/character/radiation): groin area pubic area and face cheek    Intensity:  moderate    Accompanying signs and symptoms: changing in size     History (similar episodes/previous evaluation): skin cancer in family father     facial mole keeps coming back     Precipitating or alleviating factors: None    Therapies tried and outcome: None    Accidentally cut off the lesion on her mons pubis and it is growing back bigger and is rubbing on her clothes and is irritated     -------------------------------------    Patient Active Problem List   Diagnosis     CARDIOVASCULAR SCREENING; LDL GOAL LESS THAN 160     Esophageal reflux     Anxiety     Granulomatous lung disease (H)     Past Surgical History:   Procedure Laterality Date     CYSTOSCOPY N/A 2017    Procedure: CYSTOSCOPY;;  Surgeon: Sadie Mendes MD;  Location: [facility]     D & C      postpartum hemorrhage     ESOPHAGOSCOPY, GASTROSCOPY, DUODENOSCOPY (EGD), COMBINED N/A 10/6/2014    Procedure: COMBINED ESOPHAGOSCOPY, GASTROSCOPY, DUODENOSCOPY (EGD), BIOPSY SINGLE OR MULTIPLE;  Surgeon: Sina Pimentel MD;  Location: [facility]     HC BREATH HYDROGEN TEST N/A 2015    Procedure: HYDROGEN BREATH TEST;  Surgeon: Jordan Davalos MD;  Location:  GI     HYSTERECTOMY VAGINAL N/A 2017    Procedure: HYSTERECTOMY VAGINAL;  Total Vaginal Hysterectomy & Cystoscopy;  Surgeon: Sadie Mendes MD;  Location: [facility]     TUBAL LIGATION  03    Postpartum tubal fimbriectomy       Social History     Tobacco Use     Smoking status: Former Smoker     Packs/day: 0.25     Types: Cigarettes     Last attempt to quit: 10/20/2018     Years since quittin.3     Smokeless tobacco: Never Used   Substance Use Topics     Alcohol use: No     Alcohol/week: 0.0 standard drinks      Comment: rare     Family History   Problem Relation Age of Onset     Diabetes Type 2  Maternal Grandmother      Diabetes Type 2  Maternal Grandfather         and stomach problems     Heart Disease Maternal Grandfather      Alzheimer Disease Paternal Grandmother      Cancer Paternal Grandfather         mesothelioma     Hypertension Father      Cerebrovascular Disease Father 65        minor     Other - See Comments Father         now: spot on lung and ? prostate     Lung Cancer Father      Gallbladder Disease Mother         young age, yoon     Cancer Maternal Aunt            -------------------------------------  Reviewed and updated as needed this visit by Provider         Review of Systems   ROS COMP: Constitutional, HEENT, cardiovascular, pulmonary, GI, , musculoskeletal, neuro, skin, endocrine and psych systems are negative, except as otherwise noted.      Objective    Temp 97.3  F (36.3  C) (Tympanic)   LMP 08/21/2017 (Approximate)   There is no height or weight on file to calculate BMI.  Physical Exam   GENERAL: healthy, alert and no distress  EYES: Eyes grossly normal to inspection, PERRL and conjunctivae and sclerae normal  HENT: ear canals and TM's normal, nose and mouth without ulcers or lesions  NECK: no adenopathy, no asymmetry, masses, or scars and thyroid normal to palpation  RESP: lungs clear to auscultation - no rales, rhonchi or wheezes  CV: regular rate and rhythm, normal S1 S2, no S3 or S4, no murmur, click or rub, no peripheral edema and peripheral pulses strong  ABDOMEN: soft, nontender, no hepatosplenomegaly, no masses and bowel sounds normal  MS: no gross musculoskeletal defects noted, no edema  SKIN: Hyperpigmented skin changes right cheek recurring.  Growing skin lesion mons pubis  NEURO: Normal strength and tone, mentation intact and speech normal  PSYCH: mentation appears normal, affect normal/bright    Diagnostic Test Results:  Labs reviewed in Epic        Assessment & Plan     Lauren  was seen today for mole.    Diagnoses and all orders for this visit:    Changing pigmented skin lesion  -     DERMATOLOGY REFERRAL    Skin lesion  -     Surgical pathology exam    Other orders  -     TDAP VACCINE    Dermatology referral for recurrent skin lesion right cheek  After verbal consent was obtained.  Patient desires to have lesion from mons pubis       removed.        Risks and benefits of the procedure were        discussed with the patient and verbal consent was        obtained.  The area was cleaned with an alcohol swab        and anesthesia, 1% lidocaine with        epinephrine, was administered using about 3cc.  Area was then prepped with betadine.    Using sharp iris scissors the lesion was removed in toto  Bleeding was controlled with direct pressure.  Bacitracin and Band-Aid were applied.  No sutures needed.            Keep covered with band-aide and bacitracin for 2        days.  Proper wound care and healing progression was        discussed with the patient.  Wound care handout was        given to the patient.  Monitor for erythema, purulent        discharge, .  Patient was in        agreement and will be notified of the biopsy results.        Call or return to the clinic with any worsening of symptoms or no resolution. Patient/Parent verbalized understanding and is in agreement. Medication side effects reviewed.   Current Outpatient Medications   Medication Sig Dispense Refill     acetaminophen (TYLENOL) 325 MG tablet Take 1-2 tablets (325-650 mg) by mouth every 4 hours as needed for other (mild pain) 100 tablet 0     Chart documentation with Dragon Voice recognition Software. Although reviewed after completion, some words and grammatical errors may remain.    See Patient Instructions  PCP follow-up for yearly health maintenance exam  Follow-up dermatology as planned  CANDICE Babb CNP  [facility]

## 2020-02-20 NOTE — PATIENT INSTRUCTIONS
Patient Education     Discharge Instructions: Caring for Your Wound  Taking proper care of your wound will help it heal. Your healthcare provider or nurse may show you specifically how to clean and dress the wound and how to tell if the wound is healing normally. This sheet will help you remember those guidelines when you are at home.  Getting ready    Put pets and children in another room, away from your work area.    Wash your hands before touching any of your supplies:  ? Turn on the water.  ? Wet your hands and wrists.  ? Use liquid soap from a pump dispenser. Work up a lather.  ? Scrub your hands thoroughly.  ? Rinse your hands with your fingers pointing toward the drain.  ? Dry your hands with a clean cloth or paper towel. Use this towel to turn off the faucet.  ? Remember, once you have washed your hands, don t touch anything other than your supplies. Wash your hands again if you touch anything, like furniture or your clothes.    Clean your work area:  ? Clean washable surfaces with soap and water, and dry with a clean cloth or paper towel.  ? Wipe surfaces that are not washable (like fabric or wood) so that they are free of dust. Spread a clean cloth or paper towel over your work surface.  ? Move away from the clean surface, if you need to cough or sneeze.    Gather your bandage supplies:  ? Gauze dressing or other bandage material  ? Medical tape  ? Disposable gloves    Wash your hands again.   Dressing the wound    Remove the old dressing:  ? Put on disposable gloves if you re dressing a wound for someone else or if your wound is infected.  ? Pull gently toward the wound to loosen the tape.  ? One layer at a time, gently remove the dressing.  ? If you have a drain or tube, be careful not to pull on it.  ? Look at the dressing. Make sure that you are seeing a decreasing amount of blood, and that the blood is turning into a clear, elisabeth fluid.  ? If your wound has stitches, look for loose ones.  ? Put the  dressing in a plastic bag. Then remove your gloves.    Inspect the wound. Look for signs that it isn't healing normally. A wound that isn t healing properly may be dark in color or have white streaks.    Dress the wound:  ? Wash your hands again.  ? Clean and dress the wound as you were shown by your healthcare provider or nurse.  ? If you re dressing a wound for someone else or if your wound is infected, put on a new pair of disposable gloves.  ? If you have a drain or tube, be careful not to pull on it.    Discard any used materials or trash in a sealed plastic bag before placing in a trash can.  Follow-up  Make a follow-up appointment, or as directed by your healthcare provider.  When to call your healthcare provider  Call your healthcare provider right away if you have any of the following:    Shaking chills or fever above 100.4 F (38 C)    Bleeding that soaks the dressing    Stitches that are pulling away from the wound or pulling apart    Pink fluid weeping from the wound    Increased drainage from the wound or drainage that is yellow, yellow-green, or smelly    Increased swelling, pain, or redness in the skin around the wound    A change in the color or size of the wound    Increased fatigue    Loss of appetite  Date Last Reviewed: 7/1/2017 2000-2019 [employer]. [address] [place] [address], [place] [address]. All rights reserved. This information is not intended as a substitute for professional medical care. Always follow your healthcare professional's instructions.

## 2020-02-24 LAB — COPATH REPORT: NORMAL

## 2020-10-31 NOTE — PROGRESS NOTES
Will forward to [facility] pool for pt notification of normal result.    Stefania Jones   [facility]  RN   home

## 2021-02-15 ENCOUNTER — TELEPHONE (OUTPATIENT)
Dept: FAMILY MEDICINE | Facility: CLINIC | Age: 46
End: 2021-02-15

## 2021-02-15 NOTE — TELEPHONE ENCOUNTER
Reason for call:  Patient reporting a symptom    Symptom or request: Pt calling stating she had her second covid dose 11 days ago and is still getting headaches, and some nausea, please advise     Duration (how long have symptoms been present): about 10 days     Have you been treated for this before? No    Additional comments:     Phone Number patient can be reached at:  Home number on file [phone] (home)    Best Time:  any    Can we leave a detailed message on this number:  YES    Call taken on 2/15/2021 at 3:59 PM by Francoise Hathaway

## 2021-02-15 NOTE — TELEPHONE ENCOUNTER
I talked with Lauren.  Discussed symptoms with Lauren.  Increase fluids and good nutrition.  Needs to be seen if worse.  Radha Cabral RN

## 2021-02-16 ENCOUNTER — OFFICE VISIT (OUTPATIENT)
Dept: URGENT CARE | Facility: URGENT CARE | Age: 46
End: 2021-02-16
Payer: COMMERCIAL

## 2021-02-16 VITALS
HEIGHT: 64 IN | TEMPERATURE: 98.5 F | DIASTOLIC BLOOD PRESSURE: 80 MMHG | OXYGEN SATURATION: 97 % | WEIGHT: 117 LBS | BODY MASS INDEX: 19.97 KG/M2 | HEART RATE: 83 BPM | RESPIRATION RATE: 16 BRPM | SYSTOLIC BLOOD PRESSURE: 110 MMHG

## 2021-02-16 DIAGNOSIS — R11.0 NAUSEA: ICD-10-CM

## 2021-02-16 DIAGNOSIS — H61.23 BILATERAL IMPACTED CERUMEN: ICD-10-CM

## 2021-02-16 DIAGNOSIS — G44.209 TENSION HEADACHE: Primary | ICD-10-CM

## 2021-02-16 PROCEDURE — 69209 REMOVE IMPACTED EAR WAX UNI: CPT | Performed by: PHYSICIAN ASSISTANT

## 2021-02-16 PROCEDURE — 99214 OFFICE O/P EST MOD 30 MIN: CPT | Mod: 25 | Performed by: PHYSICIAN ASSISTANT

## 2021-02-16 RX ORDER — KETOROLAC TROMETHAMINE 10 MG/1
10 TABLET, FILM COATED ORAL EVERY 6 HOURS PRN
Qty: 20 TABLET | Refills: 0 | Status: SHIPPED | OUTPATIENT
Start: 2021-02-16 | End: 2021-02-16

## 2021-02-16 RX ORDER — KETOROLAC TROMETHAMINE 10 MG/1
10 TABLET, FILM COATED ORAL EVERY 6 HOURS PRN
Qty: 20 TABLET | Refills: 0 | Status: SHIPPED | OUTPATIENT
Start: 2021-02-16

## 2021-02-16 RX ORDER — IBUPROFEN 200 MG
200 TABLET ORAL EVERY 4 HOURS PRN
COMMUNITY

## 2021-02-16 RX ORDER — CYCLOBENZAPRINE HCL 10 MG
10 TABLET ORAL 3 TIMES DAILY PRN
Qty: 30 TABLET | Refills: 0 | Status: SHIPPED | OUTPATIENT
Start: 2021-02-16

## 2021-02-16 RX ORDER — ONDANSETRON 4 MG/1
4 TABLET, ORALLY DISINTEGRATING ORAL EVERY 8 HOURS PRN
Qty: 20 TABLET | Refills: 0 | Status: SHIPPED | OUTPATIENT
Start: 2021-02-16

## 2021-02-16 ASSESSMENT — ENCOUNTER SYMPTOMS
EYE PAIN: 0
VOMITING: 0
SORE THROAT: 0
DIARRHEA: 0
SINUS PRESSURE: 0
RHINORRHEA: 0
CONSTIPATION: 0
COUGH: 0
NAUSEA: 1
HEADACHES: 1
HEARTBURN: 0
HEMATOCHEZIA: 0
ARTHRALGIAS: 0
WHEEZING: 0
EYE DISCHARGE: 0
FEVER: 0
ABDOMINAL PAIN: 0
MYALGIAS: 0
PALPITATIONS: 0
FATIGUE: 0
SHORTNESS OF BREATH: 0
EYE REDNESS: 0
BACK PAIN: 0
CHILLS: 0
SINUS PAIN: 0

## 2021-02-16 ASSESSMENT — MIFFLIN-ST. JEOR: SCORE: 1165.71

## 2021-02-16 NOTE — PROGRESS NOTES
Assessment & Plan     Tension headache  Will treat with ketorolac (TORADOL) 10 MG tablet; Take 1 tablet (10 mg) by mouth every 6 hours as needed for moderate pain and cyclobenzaprine (FLEXERIL) 10 MG tablet; Take 1 tablet (10 mg) by mouth 3 times daily as needed for muscle spasms. Discussed how to take these medications and what to expect. Encouraged gentle ROM/stretching, message, and can alternate ice/heat. Return to clinic if symptoms worsen or do not improve; otherwise follow up as needed      Nausea  Will treat with ondansetron (ZOFRAN-ODT) 4 MG ODT tab; Take 1 tablet (4 mg) by mouth every 8 hours as needed for nausea. Return to clinic if symptoms worsen or do not improve; otherwise follow up as needed      Bilateral impacted cerumen  Canals cleared. Follow up as needed.     - ID REMOVAL IMPACTED CERUMEN IRRIGATION/LVG UNILAT                 Return in about 1 week (around 2/23/2021), or if symptoms worsen or fail to improve.    Lilo Voss PA-C  [facility]    Subjective   Chief Complaint   Patient presents with     Headache     2/14/2021 has had a migraine, neck is stiff to the back of her head, nauseous, doesn't have a prescription for migraines, light sensitivity, taking ibuprofen and tylenol.       HPI     Headache    Onset of symptoms was 2day(s).  Course of illness is same  Severity moderate  Character of pain:dull and aching   Current and associated symptoms: nausea and light sensitivity  Location of pain: back of neck and wraps to top of head  Prodromal sx?:  No  History of Migranes: No  Is headache similar to previous: Yes  Predisposing factors: COVID vaccine 12 days ago  Treatment and measures tried: Tylenol 1,000mg every 4-6 hours and Ibuprofen 600mg every 6 hours              Review of Systems   Constitutional: Negative for chills, fatigue and fever.   HENT: Negative for congestion, ear pain, rhinorrhea, sinus pressure, sinus pain and sore throat.    Eyes:  "Negative for pain, discharge and redness.   Respiratory: Negative for cough, shortness of breath and wheezing.    Cardiovascular: Negative for palpitations.   Gastrointestinal: Positive for nausea. Negative for abdominal pain, constipation, diarrhea, heartburn, hematochezia and vomiting.   Musculoskeletal: Negative for arthralgias, back pain and myalgias.   Skin: Negative for rash.   Neurological: Positive for headaches.            Objective    /80 (BP Location: Right arm, Patient Position: Sitting, Cuff Size: Adult Regular)   Pulse 83   Temp 98.5  F (36.9  C) (Tympanic)   Resp 16   Ht 1.626 m (5' 4\")   Wt 53.1 kg (117 lb)   LMP 08/21/2017 (Approximate)   SpO2 97%   BMI 20.08 kg/m    Body mass index is 20.08 kg/m .  Physical Exam  Constitutional:       Appearance: She is well-developed.   HENT:      Head: Normocephalic.      Ears:      Comments: Bilateral cerumen impaction. Ear lavage performed and canals cleared. Bilateral TM's gray and intact.   Eyes:      Conjunctiva/sclera: Conjunctivae normal.      Pupils: Pupils are equal, round, and reactive to light.   Neck:      Musculoskeletal: Normal range of motion. Muscular tenderness present. No edema, erythema or neck rigidity.   Cardiovascular:      Rate and Rhythm: Normal rate.      Heart sounds: Normal heart sounds.   Pulmonary:      Effort: Pulmonary effort is normal.      Breath sounds: Normal breath sounds.   Skin:     General: Skin is warm and dry.      Findings: No rash.   Neurological:      Cranial Nerves: Cranial nerves are intact.      Sensory: Sensation is intact.      Motor: Motor function is intact.      Coordination: Coordination is intact.      Gait: Gait is intact.   Psychiatric:         Mood and Affect: Mood normal.         Speech: Speech normal.         Behavior: Behavior normal.            No results found for this or any previous visit (from the past 24 hour(s)).            "

## 2021-03-15 ENCOUNTER — TELEPHONE (OUTPATIENT)
Dept: FAMILY MEDICINE | Facility: CLINIC | Age: 46
End: 2021-03-15

## 2021-03-15 NOTE — TELEPHONE ENCOUNTER
Reason for Call:  Other call back    Detailed comments: Pt would like to know her blood type, please return call.    Phone Number Patient can be reached at: Cell number on file:    Telephone Information:   Mobile [phone]       Best Time: any    Can we leave a detailed message on this number? YES    Call taken on 3/15/2021 at 11:43 AM by Francoise Hathaway

## 2023-04-24 ENCOUNTER — OFFICE VISIT (OUTPATIENT)
Dept: URGENT CARE | Facility: URGENT CARE | Age: 48
End: 2023-04-24
Payer: COMMERCIAL

## 2023-04-24 VITALS
HEART RATE: 83 BPM | TEMPERATURE: 97 F | BODY MASS INDEX: 23.17 KG/M2 | WEIGHT: 135 LBS | SYSTOLIC BLOOD PRESSURE: 154 MMHG | DIASTOLIC BLOOD PRESSURE: 94 MMHG | OXYGEN SATURATION: 98 %

## 2023-04-24 DIAGNOSIS — J01.90 ACUTE SINUSITIS WITH SYMPTOMS > 10 DAYS: Primary | ICD-10-CM

## 2023-04-24 PROCEDURE — 99213 OFFICE O/P EST LOW 20 MIN: CPT | Performed by: PHYSICIAN ASSISTANT

## 2023-04-24 RX ORDER — FLUTICASONE PROPIONATE 50 MCG
1 SPRAY, SUSPENSION (ML) NASAL 2 TIMES DAILY
Qty: 16 G | Refills: 1 | Status: SHIPPED | OUTPATIENT
Start: 2023-04-24

## 2023-04-24 NOTE — PROGRESS NOTES
Assessment & Plan     Acute sinusitis with symptoms > 10 days  Will treat with Augmentin and flonase. Continue with supportive care. Follow up as needed.   - amoxicillin-clavulanate (AUGMENTIN) 875-125 MG tablet; Take 1 tablet by mouth 2 times daily for 7 days  - fluticasone (FLONASE) 50 MCG/ACT nasal spray; Spray 1 spray into both nostrils 2 times daily                 Return in about 1 week (around 5/1/2023), or if symptoms worsen or fail to improve.    MICHAEL Velásquez [facility] [place]                Subjective   Chief Complaint   Patient presents with     Sinus Problem     For over a month has stuffed up sinuses, not going away, now ear pain and throat pain         HPI     URI Adult    Onset of symptoms was 1 month(s) ago.  Course of illness is waxing and waning.    Severity moderate  Current and Associated symptoms: sinus pain/pressre  Treatment measures tried include Tylenol/Ibuprofen, Decongestants and Antihistamine.  Predisposing factors include None.                  Review of Systems   Constitutional, HEENT, cardiovascular, pulmonary, gi and gu systems are negative, except as otherwise noted.      Objective    BP (!) 154/94   Pulse 83   Temp 97  F (36.1  C) (Tympanic)   Wt 61.2 kg (135 lb)   LMP 08/21/2017 (Approximate)   SpO2 98%   BMI 23.17 kg/m    Body mass index is 23.17 kg/m .  Physical Exam  Constitutional:       Appearance: She is well-developed.   HENT:      Head: Normocephalic.      Right Ear: Tympanic membrane and ear canal normal.      Left Ear: Tympanic membrane and ear canal normal.   Eyes:      Conjunctiva/sclera: Conjunctivae normal.   Cardiovascular:      Rate and Rhythm: Normal rate.      Heart sounds: Normal heart sounds.   Pulmonary:      Effort: Pulmonary effort is normal.      Breath sounds: Normal breath sounds.   Skin:     General: Skin is warm and dry.      Findings: No rash.   Psychiatric:         Behavior: Behavior normal.

## 2023-05-01 ENCOUNTER — TELEPHONE (OUTPATIENT)
Dept: FAMILY MEDICINE | Facility: CLINIC | Age: 48
End: 2023-05-01
Payer: COMMERCIAL

## 2023-05-01 NOTE — TELEPHONE ENCOUNTER
Asking for another antibiotic for sinus infection.  Says not improved since taking Augmentin 10 days.   Told her she would need to be seen  She says she will give this a few more days  Radha Cabral RN

## 2024-02-11 ENCOUNTER — ANCILLARY PROCEDURE (OUTPATIENT)
Dept: GENERAL RADIOLOGY | Facility: CLINIC | Age: 49
End: 2024-02-11
Attending: PHYSICIAN ASSISTANT
Payer: COMMERCIAL

## 2024-02-11 ENCOUNTER — OFFICE VISIT (OUTPATIENT)
Dept: URGENT CARE | Facility: URGENT CARE | Age: 49
End: 2024-02-11
Payer: COMMERCIAL

## 2024-02-11 VITALS
SYSTOLIC BLOOD PRESSURE: 144 MMHG | DIASTOLIC BLOOD PRESSURE: 91 MMHG | BODY MASS INDEX: 20.43 KG/M2 | RESPIRATION RATE: 18 BRPM | WEIGHT: 119 LBS | TEMPERATURE: 98.3 F | HEART RATE: 87 BPM | OXYGEN SATURATION: 96 %

## 2024-02-11 DIAGNOSIS — R10.31 RLQ ABDOMINAL PAIN: Primary | ICD-10-CM

## 2024-02-11 DIAGNOSIS — D72.819 LEUKOPENIA, UNSPECIFIED TYPE: ICD-10-CM

## 2024-02-11 DIAGNOSIS — R10.31 RLQ ABDOMINAL PAIN: ICD-10-CM

## 2024-02-11 DIAGNOSIS — R05.1 ACUTE COUGH: ICD-10-CM

## 2024-02-11 LAB
ACANTHOCYTES BLD QL SMEAR: ABNORMAL
ALBUMIN UR-MCNC: NEGATIVE MG/DL
APPEARANCE UR: CLEAR
AUER BODIES BLD QL SMEAR: ABNORMAL
BACTERIA #/AREA URNS HPF: ABNORMAL /HPF
BASO STIPL BLD QL SMEAR: ABNORMAL
BASOPHILS # BLD AUTO: 0 10E3/UL (ref 0–0.2)
BASOPHILS NFR BLD AUTO: 1 %
BILIRUB UR QL STRIP: NEGATIVE
BITE CELLS BLD QL SMEAR: ABNORMAL
BLISTER CELLS BLD QL SMEAR: ABNORMAL
BURR CELLS BLD QL SMEAR: ABNORMAL
COLOR UR AUTO: YELLOW
DACRYOCYTES BLD QL SMEAR: ABNORMAL
ELLIPTOCYTES BLD QL SMEAR: ABNORMAL
EOSINOPHIL # BLD AUTO: 0.1 10E3/UL (ref 0–0.7)
EOSINOPHIL NFR BLD AUTO: 2 %
ERYTHROCYTE [DISTWIDTH] IN BLOOD BY AUTOMATED COUNT: 11.9 % (ref 10–15)
FRAGMENTS BLD QL SMEAR: ABNORMAL
GLUCOSE UR STRIP-MCNC: NEGATIVE MG/DL
HCT VFR BLD AUTO: 42.2 % (ref 35–47)
HGB BLD-MCNC: 14.6 G/DL (ref 11.7–15.7)
HGB C CRYSTALS: ABNORMAL
HGB UR QL STRIP: ABNORMAL
HOWELL-JOLLY BOD BLD QL SMEAR: ABNORMAL
IMM GRANULOCYTES # BLD: 0 10E3/UL
IMM GRANULOCYTES NFR BLD: 0 %
KETONES UR STRIP-MCNC: NEGATIVE MG/DL
LEUKOCYTE ESTERASE UR QL STRIP: NEGATIVE
LYMPHOCYTES # BLD AUTO: 2.2 10E3/UL (ref 0.8–5.3)
LYMPHOCYTES NFR BLD AUTO: 55 %
MCH RBC QN AUTO: 31.9 PG (ref 26.5–33)
MCHC RBC AUTO-ENTMCNC: 34.6 G/DL (ref 31.5–36.5)
MCV RBC AUTO: 92 FL (ref 78–100)
MONOCYTES # BLD AUTO: 0.4 10E3/UL (ref 0–1.3)
MONOCYTES NFR BLD AUTO: 10 %
NEUTROPHILS # BLD AUTO: 1.2 10E3/UL (ref 1.6–8.3)
NEUTROPHILS NFR BLD AUTO: 32 %
NEUTS HYPERSEG BLD QL SMEAR: ABNORMAL
NITRATE UR QL: NEGATIVE
NRBC # BLD AUTO: 0 10E3/UL
NRBC BLD AUTO-RTO: 0 /100
PH UR STRIP: 7 [PH] (ref 5–7)
PLAT MORPH BLD: ABNORMAL
PLATELET # BLD AUTO: 194 10E3/UL (ref 150–450)
POLYCHROMASIA BLD QL SMEAR: ABNORMAL
RBC # BLD AUTO: 4.58 10E6/UL (ref 3.8–5.2)
RBC #/AREA URNS AUTO: ABNORMAL /HPF
RBC AGGLUT BLD QL: ABNORMAL
RBC MORPH BLD: ABNORMAL
ROULEAUX BLD QL SMEAR: ABNORMAL
SICKLE CELLS BLD QL SMEAR: ABNORMAL
SMUDGE CELLS BLD QL SMEAR: ABNORMAL
SP GR UR STRIP: 1.01 (ref 1–1.03)
SPHEROCYTES BLD QL SMEAR: ABNORMAL
STOMATOCYTES BLD QL SMEAR: ABNORMAL
TARGETS BLD QL SMEAR: ABNORMAL
TOXIC GRANULES BLD QL SMEAR: ABNORMAL
UROBILINOGEN UR STRIP-ACNC: 0.2 E.U./DL
VARIANT LYMPHS BLD QL SMEAR: PRESENT
WBC # BLD AUTO: 3.9 10E3/UL (ref 4–11)
WBC #/AREA URNS AUTO: ABNORMAL /HPF

## 2024-02-11 PROCEDURE — 74019 RADEX ABDOMEN 2 VIEWS: CPT | Mod: TC | Performed by: RADIOLOGY

## 2024-02-11 PROCEDURE — 36415 COLL VENOUS BLD VENIPUNCTURE: CPT | Performed by: PHYSICIAN ASSISTANT

## 2024-02-11 PROCEDURE — 85025 COMPLETE CBC W/AUTO DIFF WBC: CPT | Performed by: PHYSICIAN ASSISTANT

## 2024-02-11 PROCEDURE — 87635 SARS-COV-2 COVID-19 AMP PRB: CPT | Performed by: PHYSICIAN ASSISTANT

## 2024-02-11 PROCEDURE — 81001 URINALYSIS AUTO W/SCOPE: CPT | Performed by: PHYSICIAN ASSISTANT

## 2024-02-11 PROCEDURE — 99214 OFFICE O/P EST MOD 30 MIN: CPT | Performed by: PHYSICIAN ASSISTANT

## 2024-02-11 RX ORDER — BENZONATATE 200 MG/1
200 CAPSULE ORAL 3 TIMES DAILY PRN
Qty: 30 CAPSULE | Refills: 0 | Status: SHIPPED | OUTPATIENT
Start: 2024-02-11

## 2024-02-11 RX ORDER — ALBUTEROL SULFATE 90 UG/1
AEROSOL, METERED RESPIRATORY (INHALATION)
Qty: 18 G | Refills: 0 | Status: SHIPPED | OUTPATIENT
Start: 2024-02-11

## 2024-02-11 NOTE — LETTER
February 11, 2024      Lauren RANGEL Renae  [id number] [facility] [id number]        To Whom It May Concern:    Lauren Macdonald  was seen and treated in clinic. Please excuse from work 2/12/24 and 2/13/24.        Sincerely,          Lilo Voss PA-C

## 2024-02-11 NOTE — PROGRESS NOTES
Assessment & Plan     1. RLQ abdominal pain  X-ray and urine are within normal limits. WBC is low. Continue to monitor symptoms. Discussed in detail symptoms that would warrant emergent evaluation in the ED. Patient agrees with plan and will follow up as needed.      - XR Abdomen 2 Views; Future  - CBC with platelets and differential; Future  - UA Macroscopic with reflex to Microscopic and Culture - Lab Collect; Future  - CBC with platelets and differential  - UA Macroscopic with reflex to Microscopic and Culture - Lab Collect  - UA Microscopic with Reflex to Culture    2. Acute cough  COVID pending. his is likely viral. Continue with supportive care. Get plenty of rest and push fluids. Can use Tylenol and/or ibuprofen as needed for pain and/or fever control. Discussed return to school/work guidelines. Return to clinic if symptoms worsen or do not improve; otherwise follow up as needed     - Symptomatic COVID-19 Virus (Coronavirus) by PCR Nose  - albuterol (PROAIR HFA/PROVENTIL HFA/VENTOLIN HFA) 108 (90 Base) MCG/ACT inhaler; Inhale 2 puffs every 4-6 hours as needed for cough, wheezing, or shortness of breath  Dispense: 18 g; Refill: 0  - benzonatate (TESSALON) 200 MG capsule; Take 1 capsule (200 mg) by mouth 3 times daily as needed for cough  Dispense: 30 capsule; Refill: 0    3. Leukopenia, unspecified type  Likely related to viral illness. Patient is due to physical. Could recheck CBC at that time.                   Return in about 2 days (around 2/13/2024), or if symptoms worsen or fail to improve.              Subjective     Chief Complaint   Patient presents with    Cough     Cough started on Tuesday, before was very phlegmy, says now when she coughs, feels like something ripped on lower right abdominal area. Just now has a lingering cough and side pain.       HPI     Abdominal Pain    Location: RLQ     Pain character: dull and aching,   Severity: 5 on a scale of 1-10.    Duration: 1 day(s)   Course of  Illness: slowly progressive.  Exacerbated by: movement/walking  Relieved by: nothing.  Associated Symptoms: nausea.  Female : hysterectomy  Surgical History: hysterectomy    Patient also reports ongoing cough x 5 days. No recent fever/chills. 2 home COVID tests negative.                   Objective    BP (!) 144/91   Pulse 87   Temp 98.3  F (36.8  C) (Tympanic)   Resp 18   Wt 54 kg (119 lb)   LMP 08/21/2017 (Approximate)   SpO2 96%   BMI 20.43 kg/m    Body mass index is 20.43 kg/m .  Physical Exam  Constitutional:       Appearance: She is well-developed.   HENT:      Head: Normocephalic.      Right Ear: Tympanic membrane and ear canal normal.      Left Ear: Tympanic membrane and ear canal normal.   Eyes:      Conjunctiva/sclera: Conjunctivae normal.   Cardiovascular:      Rate and Rhythm: Normal rate.      Heart sounds: Normal heart sounds.   Pulmonary:      Effort: Pulmonary effort is normal.      Breath sounds: Normal breath sounds.   Abdominal:      General: Bowel sounds are normal.      Palpations: Abdomen is soft.      Tenderness: There is abdominal tenderness in the right lower quadrant. There is no guarding or rebound.   Skin:     General: Skin is warm and dry.      Findings: No rash.   Psychiatric:         Behavior: Behavior normal.            Xray - Reviewed and interpreted by me.  No acute SBO    Results for orders placed or performed in visit on 02/11/24 (from the past 24 hour(s))   CBC with platelets and differential    Narrative    The following orders were created for panel order CBC with platelets and differential.  Procedure                               Abnormality         Status                     ---------                               -----------         ------                     CBC with platelets and d...[[id number]]                      In process                   Please view results for these tests on the individual orders.   UA Macroscopic with reflex to Microscopic and Culture -  Lab Collect    Specimen: Urine, Clean Catch   Result Value Ref Range    Color Urine Yellow Colorless, Straw, Light Yellow, Yellow    Appearance Urine Clear Clear    Glucose Urine Negative Negative mg/dL    Bilirubin Urine Negative Negative    Ketones Urine Negative Negative mg/dL    Specific Gravity Urine 1.010 1.003 - 1.035    Blood Urine Trace (A) Negative    pH Urine 7.0 5.0 - 7.0    Protein Albumin Urine Negative Negative mg/dL    Urobilinogen Urine 0.2 0.2, 1.0 E.U./dL    Nitrite Urine Negative Negative    Leukocyte Esterase Urine Negative Negative   UA Microscopic with Reflex to Culture   Result Value Ref Range    Bacteria Urine Few (A) None Seen /HPF    RBC Urine 0-2 0-2 /HPF /HPF    WBC Urine 0-5 0-5 /HPF /HPF    Narrative    Urine Culture not indicated           Signed Electronically by: Lilo Voss PA-C

## 2024-02-12 LAB — SARS-COV-2 RNA RESP QL NAA+PROBE: NEGATIVE

## (undated) DEVICE — SOL NACL 0.9% INJ 1000ML BAG 07983-09

## (undated) DEVICE — SU VICRYL 0 CT-2 CR 8X18" J727D

## (undated) DEVICE — SOL NACL 0.9% IRRIG 1000ML BOTTLE 07138-09

## (undated) DEVICE — TUBING CYSTO/BLADDER IRRIG SET 80" 06544-01

## (undated) DEVICE — TUBING SUCTION 12"X1/4" N612

## (undated) DEVICE — LUBRICATING JELLY 4.25OZ

## (undated) DEVICE — SUCTION TIP YANKAUER STR K87

## (undated) DEVICE — STOCKING SLEEVE COMPRESSION CALF MED

## (undated) DEVICE — ESU LIGASURE IMPACT OPEN SEALER/DVDR CVD LG JAW 36MM LF4318

## (undated) DEVICE — GLOVE PROTEXIS BLUE W/NEU-THERA 6.0  2D73EB60

## (undated) DEVICE — SU VICRYL 0 CT-1 36" J946H

## (undated) DEVICE — PACK LAPAROSCOPY/PELVISCOPY STD

## (undated) DEVICE — PAD FLOOR SURGISAFE

## (undated) DEVICE — ESU PENCIL W/COATED BLADE E2450H

## (undated) DEVICE — GLOVE PROTEXIS MICRO 5.5  2D73PM55

## (undated) DEVICE — SOL WATER IRRIG 1000ML BOTTLE 07139-09

## (undated) DEVICE — BLADE KNIFE SURG 10 371110

## (undated) DEVICE — GOWN LG DISP 9515

## (undated) DEVICE — PAD PERI INDIV WRAP 11" 2022

## (undated) DEVICE — DECANTER VIAL 2006S

## (undated) DEVICE — CATH TRAY FOLEY SURESTEP 16FR W/URINE MTR STATLK LF A303416A

## (undated) RX ORDER — HYDROMORPHONE HYDROCHLORIDE 1 MG/ML
INJECTION, SOLUTION INTRAMUSCULAR; INTRAVENOUS; SUBCUTANEOUS
Status: DISPENSED
Start: 2017-09-06

## (undated) RX ORDER — OXYCODONE HCL 10 MG/1
TABLET, FILM COATED, EXTENDED RELEASE ORAL
Status: DISPENSED
Start: 2017-09-06

## (undated) RX ORDER — PHENYLEPHRINE HCL IN 0.9% NACL 1 MG/10 ML
SYRINGE (ML) INTRAVENOUS
Status: DISPENSED
Start: 2017-09-06

## (undated) RX ORDER — PHENAZOPYRIDINE HYDROCHLORIDE 200 MG/1
TABLET, FILM COATED ORAL
Status: DISPENSED
Start: 2017-09-06

## (undated) RX ORDER — IBUPROFEN 600 MG/1
TABLET, FILM COATED ORAL
Status: DISPENSED
Start: 2017-09-06

## (undated) RX ORDER — KETOROLAC TROMETHAMINE 30 MG/ML
INJECTION, SOLUTION INTRAMUSCULAR; INTRAVENOUS
Status: DISPENSED
Start: 2017-09-06

## (undated) RX ORDER — ACETAMINOPHEN 325 MG/1
TABLET ORAL
Status: DISPENSED
Start: 2017-09-06

## (undated) RX ORDER — CLINDAMYCIN PHOSPHATE 900 MG/50ML
INJECTION, SOLUTION INTRAVENOUS
Status: DISPENSED
Start: 2017-09-06

## (undated) RX ORDER — HYDROXYZINE HYDROCHLORIDE 25 MG/1
TABLET, FILM COATED ORAL
Status: DISPENSED
Start: 2017-09-06

## (undated) RX ORDER — PROPOFOL 10 MG/ML
INJECTION, EMULSION INTRAVENOUS
Status: DISPENSED
Start: 2017-09-06

## (undated) RX ORDER — ONDANSETRON 2 MG/ML
INJECTION INTRAMUSCULAR; INTRAVENOUS
Status: DISPENSED
Start: 2017-09-06

## (undated) RX ORDER — DEXAMETHASONE SODIUM PHOSPHATE 4 MG/ML
INJECTION, SOLUTION INTRA-ARTICULAR; INTRALESIONAL; INTRAMUSCULAR; INTRAVENOUS; SOFT TISSUE
Status: DISPENSED
Start: 2017-09-06

## (undated) RX ORDER — FENTANYL CITRATE 50 UG/ML
INJECTION, SOLUTION INTRAMUSCULAR; INTRAVENOUS
Status: DISPENSED
Start: 2017-09-06

## (undated) RX ORDER — LIDOCAINE HYDROCHLORIDE 10 MG/ML
INJECTION, SOLUTION INFILTRATION; PERINEURAL
Status: DISPENSED
Start: 2017-09-06

## (undated) RX ORDER — OXYCODONE HYDROCHLORIDE 5 MG/1
TABLET ORAL
Status: DISPENSED
Start: 2017-09-06